# Patient Record
Sex: FEMALE | Race: BLACK OR AFRICAN AMERICAN | Employment: OTHER | URBAN - METROPOLITAN AREA
[De-identification: names, ages, dates, MRNs, and addresses within clinical notes are randomized per-mention and may not be internally consistent; named-entity substitution may affect disease eponyms.]

---

## 2017-05-30 ENCOUNTER — OFFICE VISIT (OUTPATIENT)
Dept: HEMATOLOGY | Age: 62
End: 2017-05-30

## 2017-05-30 VITALS
HEART RATE: 83 BPM | DIASTOLIC BLOOD PRESSURE: 73 MMHG | WEIGHT: 293 LBS | HEIGHT: 63 IN | TEMPERATURE: 99.4 F | OXYGEN SATURATION: 96 % | BODY MASS INDEX: 51.91 KG/M2 | SYSTOLIC BLOOD PRESSURE: 156 MMHG

## 2017-05-30 DIAGNOSIS — K75.4 AUTOIMMUNE HEPATITIS (HCC): Primary | ICD-10-CM

## 2017-05-30 PROBLEM — E66.9 OBESITY: Status: ACTIVE | Noted: 2017-05-30

## 2017-05-30 PROBLEM — E11.9 DIABETES MELLITUS TYPE 2, CONTROLLED (HCC): Status: ACTIVE | Noted: 2017-05-30

## 2017-05-30 PROBLEM — E78.00 HYPERCHOLESTEROLEMIA: Status: ACTIVE | Noted: 2017-05-30

## 2017-05-30 PROBLEM — I10 HYPERTENSION: Status: ACTIVE | Noted: 2017-05-30

## 2017-05-30 PROBLEM — R60.9 EDEMA: Status: ACTIVE | Noted: 2017-05-30

## 2017-05-30 RX ORDER — AZATHIOPRINE 50 MG/1
TABLET ORAL
COMMUNITY
Start: 2017-05-26 | End: 2017-05-30 | Stop reason: SDUPTHER

## 2017-05-30 RX ORDER — PREDNISONE 20 MG/1
TABLET ORAL
COMMUNITY
Start: 2017-05-26 | End: 2017-05-30 | Stop reason: SDUPTHER

## 2017-05-30 RX ORDER — DOXAZOSIN 2 MG/1
TABLET ORAL
Refills: 0 | COMMUNITY
Start: 2017-04-27 | End: 2018-04-26

## 2017-05-30 RX ORDER — FAMOTIDINE 20 MG/1
TABLET, FILM COATED ORAL
COMMUNITY
Start: 2017-05-08 | End: 2018-03-19

## 2017-05-30 RX ORDER — SPIRONOLACTONE 25 MG/1
TABLET ORAL
COMMUNITY
Start: 2017-05-18 | End: 2018-04-26

## 2017-05-30 RX ORDER — INSULIN LISPRO 100 [IU]/ML
INJECTION, SOLUTION INTRAVENOUS; SUBCUTANEOUS
Refills: 2 | COMMUNITY
Start: 2017-03-31 | End: 2018-03-19

## 2017-05-30 RX ORDER — EPINASTINE HYDROCHLORIDE 0.5 MG/ML
SOLUTION/ DROPS OPHTHALMIC
COMMUNITY
Start: 2017-05-27 | End: 2018-04-26

## 2017-05-30 RX ORDER — AZATHIOPRINE 50 MG/1
100 TABLET ORAL DAILY
Qty: 30 TAB | Refills: 0
Start: 2017-05-30 | End: 2018-03-19 | Stop reason: ALTCHOICE

## 2017-05-30 RX ORDER — INSULIN GLARGINE 100 [IU]/ML
26 INJECTION, SOLUTION SUBCUTANEOUS DAILY
COMMUNITY
Start: 2017-05-18

## 2017-05-30 RX ORDER — FUROSEMIDE 40 MG/1
40 TABLET ORAL DAILY
Qty: 90 TAB | Refills: 3 | Status: SHIPPED | OUTPATIENT
Start: 2017-05-30 | End: 2018-07-07 | Stop reason: SDUPTHER

## 2017-05-30 RX ORDER — SPIRONOLACTONE 100 MG/1
100 TABLET, FILM COATED ORAL DAILY
Qty: 90 TAB | Refills: 3 | Status: SHIPPED | OUTPATIENT
Start: 2017-05-30 | End: 2018-05-09 | Stop reason: SDUPTHER

## 2017-05-30 RX ORDER — AMLODIPINE BESYLATE 2.5 MG/1
TABLET ORAL
COMMUNITY
Start: 2017-05-08

## 2017-05-30 RX ORDER — PREDNISONE 20 MG/1
30 TABLET ORAL DAILY
Qty: 30 TAB | Refills: 0
Start: 2017-05-30 | End: 2017-10-19 | Stop reason: DRUGHIGH

## 2017-05-30 NOTE — MR AVS SNAPSHOT
Visit Information Date & Time Provider Department Dept. Phone Encounter #  
 5/30/2017  3:00 PM Neeta Montana MD [facility] [phone] [id number] Upcoming Health Maintenance Date Due Hepatitis C Screening 1955 DTaP/Tdap/Td series (1 - Tdap) 10/8/1974 PAP AKA CERVICAL CYTOLOGY 10/8/1974 BREAST CANCER SCRN MAMMOGRAM 10/8/2003 FOBT Q 1 YEAR AGE 50-75 10/8/2003 ZOSTER VACCINE AGE 60> 10/8/2013 INFLUENZA AGE 9 TO ADULT 8/1/2017 Allergies as of 5/30/2017  Review Complete On: 5/30/2017 By: Luis Mojica LPN Not on File Current Immunizations  Never Reviewed No immunizations on file. Not reviewed this visit Vitals BP Pulse Temp Height(growth percentile) Weight(growth percentile) SpO2  
 156/73 83 99.4 °F (37.4 °C) (Tympanic) 5' 3\" (1.6 m) 306 lb 4 oz (138.9 kg) 96% BMI Smoking Status 54.25 kg/m2 Never Smoker BMI and BSA Data Body Mass Index Body Surface Area  
 54.25 kg/m 2 2.48 m 2 Preferred Pharmacy Pharmacy Name Phone [place] [id number] [facility] [address] [place] [address] [phone] Your Updated Medication List  
  
   
This list is accurate as of: 5/30/17  4:24 PM.  Always use your most recent med list. amLODIPine 2.5 mg tablet Commonly known as:  NORVASC  
  
 azaTHIOprine 50 mg tablet Commonly known as:  [employer] Take 2 Tabs by mouth daily. doxazosin 2 mg tablet Commonly known as:  CARDURA TAKE 1 TABLET BY MOUTH DAILY  
  
 epinastine 0.05 % Drop  
  
 famotidine 20 mg tablet Commonly known as:  PEPCID  
  
 furosemide 40 mg tablet Commonly known as:  LASIX Take 1 Tab by mouth daily. HumaLOG KwikPen 100 unit/mL kwikpen Generic drug:  insulin lispro INJECT 2 UNITS SUBCUTANEOUSLY 3 TIMES A DAY WITH A MEAL. LANTUS SOLOSTAR 100 unit/mL (3 mL) Inpn Generic drug:  insulin glargine predniSONE 20 mg tablet Commonly known as:  [employer] Take 2 Tabs by mouth daily. * spironolactone 25 mg tablet Commonly known as:  ALDACTONE  
  
 * spironolactone 100 mg tablet Commonly known as:  ALDACTONE Take 1 Tab by mouth daily. * Notice: This list has 2 medication(s) that are the same as other medications prescribed for you. Read the directions carefully, and ask your doctor or other care provider to review them with you. Prescriptions Sent to Pharmacy Refills  
 furosemide (LASIX) 40 mg tablet 3 Sig: Take 1 Tab by mouth daily. Class: Normal  
 Pharmacy: [address] [place] [address], [address] [place] [address] Ph #: [phone] Route: Oral  
 spironolactone (ALDACTONE) 100 mg tablet 3 Sig: Take 1 Tab by mouth daily. Class: Normal  
 Pharmacy: [address] [place] [address], [address] [place] [address] Ph #: [phone] Route: Oral  
  
Introducing [facility]! [facility] introduces elmenus patient portal. Now you can access parts of your medical record, email your doctor's office, and request medication refills online. 1. In your internet browser, go to [email or web address] 2. Click on the First Time User? Click Here link in the Sign In box. You will see the New Member Sign Up page. 3. Enter your elmenus Access Code exactly as it appears below. You will not need to use this code after youve completed the sign-up process. If you do not sign up before the expiration date, you must request a new code. · elmenus Access Code: [email or web address] Expires: 8/28/2017  4:24 PM 
 
4. Enter the last four digits of your Social Security Number (xxxx) and Date of Birth (mm/dd/yyyy) as indicated and click Submit. You will be taken to the next sign-up page. 5. Create a elmenus ID. This will be your [employer] login ID and cannot be changed, so think of one that is secure and easy to remember. 6. Create a [employer] password. You can change your password at any time. 7. Enter your Password Reset Question and Answer. This can be used at a later time if you forget your password. 8. Enter your e-mail address. You will receive e-mail notification when new information is available in [address] 9. Click Sign Up. You can now view and download portions of your medical record. 10. Click the Download Summary menu link to download a portable copy of your medical information. If you have questions, please visit the Frequently Asked Questions section of the [employer] website. Remember, [employer] is NOT to be used for urgent needs. For medical emergencies, dial 911. Now available from your iPhone and Android! Please provide this summary of care documentation to your next provider. Your primary care clinician is listed as Will Moore. If you have any questions after today's visit, please call [phone].

## 2017-05-30 NOTE — PROGRESS NOTES
93 Nicanor Gómez MD, KAY Parsons PA-C Elihu Safe, MD, FACP, [address], [place]        [address] [facility], [address] [address]  22.     [phone]     FAX: 411 [address], [address] [facility], [address]     [phone]     FAX: [phone]       Patient Care Team:  Erma Cranker, MD as PCP - General (Family Practice)      Problem List  Date Reviewed: 5/30/2017          Codes Class Noted    Autoimmune hepatitis [facility]) ICD-10-CM: K75.4  ICD-9-CM: 571.42  5/30/2017        Obesity ICD-10-CM: E66.9  ICD-9-CM: 278.00  5/30/2017        Diabetes mellitus, type 2 ([facility] ICD-10-CM: E11.9  ICD-9-CM: 250.00  5/30/2017        Edema ICD-10-CM: R60.9  ICD-9-CM: 782.3  5/30/2017        Hypertension ICD-10-CM: I10  ICD-9-CM: 401.9  5/30/2017        Hypercholesterolemia ICD-10-CM: E78.00  ICD-9-CM: 272.0  5/30/2017                The physicians listed above have asked me to see Jigar Laurent in consultation regarding Autoimmune Hepatitis. All medical records sent by the referring physicians were reviewed including imaging studies and pathology. The patient is a 59 y.o. Black female who was first noted to have abnormalities in liver transaminases and alkaline phosphase in 3/2017. Further evaluation of these abnormalities has included serologic studies which were positive for ASMA    Ultrasound of the liver was performed in 3/2017. The results of the imaging are not available at this time. A liver biopsy was performed in 3/2017. This demonstrated severe inflammation portal fibrosis. The patient has been treated with Prednisone 60 then 40 now 30 mg and Imuran. As the does of prednsione was lower the dose of Imuran was increased from 50 to 100 mg.       The treatment was successful with normalization of serum liver enzymes. The initial AST/ALT were in the 700-800 range. These are now normal.  The initial ALP was 350. This is now down to 108. Tests of hepatic synthetic and metabolic function are normal, and the platelet count is normal.    The patient notes fatigue, swelling of the lower extremities, itching,     The patient has limitations in functional activities secondary to her obesity. The patient has not experienced yellowing of the eyes or skin,       ALLERGIES  Not on File    MEDICATIONS  Current Outpatient Prescriptions   Medication Sig    amLODIPine (NORVASC) 2.5 mg tablet     spironolactone (ALDACTONE) 25 mg tablet     predniSONE (DELTASONE) 20 mg tablet     HUMALOG KWIKPEN 100 unit/mL kwikpen INJECT 2 UNITS SUBCUTANEOUSLY 3 TIMES A DAY WITH A MEAL.  LANTUS SOLOSTAR 100 unit/mL (3 mL) inpn     famotidine (PEPCID) 20 mg tablet     epinastine 0.05 % drop     doxazosin (CARDURA) 2 mg tablet TAKE 1 TABLET BY MOUTH DAILY    azaTHIOprine (IMURAN) 50 mg tablet      No current facility-administered medications for this visit. SYSTEM REVIEW NOT RELATED TO LIVER DISEASE OR REVIEWED ABOVE:  Constitution systems: severely overweight  Eyes: Negative for visual changes. ENT: Negative for sore throat, painful swallowing. Respiratory: Negative for cough, hemoptysis, SOB. Cardiology: Negative for chest pain, palpitations. GI:  Negative for constipation or diarrhea. : Negative for urinary frequency, dysuria, hematuria, nocturia. Skin: Negative for rash. Hematology: Negative for easy bruising, blood clots. Musculo-skelatal: Arthritis of knees. Neurologic: Negative for headaches, dizziness, vertigo, memory problems not related to HE. Psychology: Negative for anxiety, depression. FAMILY HISTORY:  The father  of DM. The mother  of Sarcoidosis. There is no family history of liver disease.     There is no family history of immune disorders. SOCIAL HISTORY:  The patient is . The patient has 2 children, and 7 grandchildren. The patient has never used tobacco products. The patient consumes alcohol on social occasions never in excess. The patient has been abstinent from alcohol since 3/2017. The patient used to work in [employer] for children. The patient has not worked since 7/2015. PHYSICAL EXAMINATION:  /73  Pulse 83  Temp 99.4 °F (37.4 °C) (Tympanic)   Ht 5' 3\" (1.6 m)  Wt 306 lb 4 oz (138.9 kg)  SpO2 96%  BMI 54.25 kg/m2  General: No acute distress. Eyes: Sclera anicteric. ENT: No oral lesions. Thyroid normal.  Nodes: No adenopathy. Skin: No spider angiomata. No jaundice. No palmar erythema. Respiratory: Lungs clear to auscultation. Cardiovascular: Regular heart rate. No murmurs. No JVD. Abdomen: Soft non-tender. Liver size normal to percussion/palpation. Spleen not palpable. No obvious ascites. Extremities: No edema. No muscle wasting. No gross arthritic changes. Neurologic: Alert and oriented. Cranial nerves grossly intact. No asterixsis. LABORATORY STUDIES:  From 5/2017  AST/ALT/ALP/T Bili/ALB:  17/25/108/0.7/3.2  WBC/HB/PLT/INR:  8.2/12.0/177  BUN/CREAT:  10/0.79    SEROLOGIES:  3/2017. HBsAntigen negative, anti-HCV negative, Ferritin 239, iron saturation 11%, KAYLEE negative, ASMA positive 1:320,     LIVER HISTOLOGY:  3/2017. Severe active hepatitis with PMN and portal fibrosis. Biopsy slides not reviewed by MLS. ENDOSCOPIC PROCEDURES:  Not available or performed    RADIOLOGY:  3/2017. Ultrasound of liver. Mild increased echogenicity. No liver mass lesions. No dilated bile ducts. No ascites.     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  Alkaline phosphate is near normal.  Liver function is normal.  The platelet count is normal. The patient is receiving treatment with prednisone and Imuran. The patient is responding to the treatment well. Her severe obesity will be a big issue with long term prednisone. I would suggest that prednisone continue to be weaned and eventually stopped. I have lowered the dose of prednisoe today to 20 mg every day. I would continue to lower the dose by 5 mg every 2 weeks. If the liver enzymes remain normal she will stop prednisone and remain on the current dose of Imuron. If the liver enzymes flair during the weaning process or after prednisone is stopped I would not icrcease the dose of prednisone but switch to prograf. I would be happy to see her back at that point if you wish. She just had labs done at [facility] 1 week ago. We did not repeat any labs today. Anselmo and Gigi Higginbotham have done an excellent job managing and treating the AIH. I have told Karine Vo to return to their care and discuss if they wish to follow her long term or if they want to transfer care to me. I am good either way. The patient was directed to continue all current medications at the current dosages. There are no contraindications for the patient to take any medications that are necessary for treatment of other medical issues. The patient was counseled regarding alcohol consumption. The need for vaccination against viral hepatitis A and B will be assessed with serologic and instituted as appropriate. Anemia may be secondary to low iron stores. Will check ferritin and iron panel. If iron deficient will supplement with oral iron and evaluate for GI blood loss. All of the above issues were discussed with the patient. All questions were answered. The patient expressed a clear understanding of the above. No follow-up at [address] is needed. I would be glad to see the patient back for follow-up at any time in the future if the clinical situation changes.     Geoffrey Palma India Hutchinson, [address] of [address] [place] [address] [place]ST, [address] [facility], [address] [place] [address] Street [address] [phone]

## 2017-10-02 ENCOUNTER — OFFICE VISIT (OUTPATIENT)
Dept: HEMATOLOGY | Age: 62
End: 2017-10-02

## 2017-10-02 VITALS
HEART RATE: 87 BPM | BODY MASS INDEX: 51.91 KG/M2 | TEMPERATURE: 98.3 F | HEIGHT: 63 IN | DIASTOLIC BLOOD PRESSURE: 73 MMHG | SYSTOLIC BLOOD PRESSURE: 189 MMHG | OXYGEN SATURATION: 100 % | WEIGHT: 293 LBS | RESPIRATION RATE: 16 BRPM

## 2017-10-02 DIAGNOSIS — K75.4 AUTOIMMUNE HEPATITIS (HCC): Primary | ICD-10-CM

## 2017-10-02 RX ORDER — TACROLIMUS 1 MG/1
2 CAPSULE ORAL EVERY 12 HOURS
Qty: 120 CAP | Refills: 3 | Status: SHIPPED | OUTPATIENT
Start: 2017-10-02 | End: 2018-02-15 | Stop reason: SDUPTHER

## 2017-10-02 NOTE — PROGRESS NOTES
93 Nicanor Gómez MD, KAY Soot PA-C Sloan Blade, MD, FACP, [address], [place]        [address] [facility], [address] [facility] [place]  22.     [phone]     FAX: 150 [address], [address] [facility], [address]     [phone]     FAX: [phone]       Patient Care Team:  Zehra Wang MD as PCP - General (Family Practice)  Luis Enrique Reddy MD (Gastroenterology)      Problem List  Date Reviewed: 5/31/2017          Codes Class Noted    Autoimmune hepatitis [facility]) ICD-10-CM: K75.4  ICD-9-CM: 571.42  5/30/2017        Obesity ICD-10-CM: E66.9  ICD-9-CM: 278.00  5/30/2017        Diabetes mellitus, type 2 ([facility] ICD-10-CM: E11.9  ICD-9-CM: 250.00  5/30/2017        Edema ICD-10-CM: R60.9  ICD-9-CM: 782.3  5/30/2017        Hypertension ICD-10-CM: I10  ICD-9-CM: 401.9  5/30/2017        Hypercholesterolemia ICD-10-CM: E78.00  ICD-9-CM: 272.0  5/30/2017                The physicians listed above have asked me to see Justine Crisostomo in consultation regarding Autoimmune Hepatitis. All medical records sent by the referring physicians were reviewed including imaging studies and pathology. The patient is a 59 y.o. Black female who was first noted to have abnormalities in liver transaminases and alkaline phosphase in 3/2017. Further evaluation of these abnormalities has included serologic studies which were positive for ASMA    Ultrasound of the liver was performed in 3/2017. The results of the imaging are not available at this time. A liver biopsy was performed in 3/2017. This demonstrated severe inflammation portal fibrosis. The patient has been treated with Prednisone 60 then 40 now 30 mg and Imuran. As the does of prednsione was lower the dose of Imuran was increased from 50 to 100 mg. The treatment was successful with normalization of serum liver enzymes. The initial AST/ALT were in the 700-800 range. These are now normal.  The initial ALP was 350. This is now down to 108. Tests of hepatic synthetic and metabolic function are normal, and the platelet count is normal.    The patient notes fatigue, swelling of the lower extremities, itching,     The patient has limitations in functional activities secondary to her obesity. The patient has not experienced yellowing of the eyes or skin,       ALLERGIES  No Known Allergies    MEDICATIONS  Current Outpatient Prescriptions   Medication Sig    amLODIPine (NORVASC) 2.5 mg tablet     spironolactone (ALDACTONE) 25 mg tablet     HUMALOG KWIKPEN 100 unit/mL kwikpen INJECT 2 UNITS SUBCUTANEOUSLY 3 TIMES A DAY WITH A MEAL.  LANTUS SOLOSTAR 100 unit/mL (3 mL) inpn     famotidine (PEPCID) 20 mg tablet     epinastine 0.05 % drop     doxazosin (CARDURA) 2 mg tablet TAKE 1 TABLET BY MOUTH DAILY    azaTHIOprine (IMURAN) 50 mg tablet Take 2 Tabs by mouth daily.  predniSONE (DELTASONE) 20 mg tablet Take 2 Tabs by mouth daily.  furosemide (LASIX) 40 mg tablet Take 1 Tab by mouth daily.  spironolactone (ALDACTONE) 100 mg tablet Take 1 Tab by mouth daily. No current facility-administered medications for this visit. SYSTEM REVIEW NOT RELATED TO LIVER DISEASE OR REVIEWED ABOVE:  Constitution systems: severely overweight  Eyes: Negative for visual changes. ENT: Negative for sore throat, painful swallowing. Respiratory: Negative for cough, hemoptysis, SOB. Cardiology: Negative for chest pain, palpitations. GI:  Negative for constipation or diarrhea. : Negative for urinary frequency, dysuria, hematuria, nocturia. Skin: Negative for rash. Hematology: Negative for easy bruising, blood clots. Musculo-skelatal: Arthritis of knees.     Neurologic: Negative for headaches, dizziness, vertigo, memory problems not related to HE.  Psychology: Negative for anxiety, depression. FAMILY HISTORY:  The father  of DM. The mother  of Sarcoidosis. There is no family history of liver disease. There is no family history of immune disorders. SOCIAL HISTORY:  The patient is . The patient has 2 children, and 7 grandchildren. The patient has never used tobacco products. The patient consumes alcohol on social occasions never in excess. The patient has been abstinent from alcohol since 3/2017. The patient used to work in [employer] for children. The patient has not worked since 2015. PHYSICAL EXAMINATION:  /73 (BP 1 Location: Right arm, BP Patient Position: Sitting)  Pulse 87  Temp 98.3 °F (36.8 °C) (Tympanic)   Resp 16  Ht 5' 3\" (1.6 m)  Wt 303 lb (137.4 kg)  SpO2 100%  BMI 53.67 kg/m2  General: No acute distress. Eyes: Sclera anicteric. ENT: No oral lesions. Thyroid normal.  Nodes: No adenopathy. Skin: No spider angiomata. No jaundice. No palmar erythema. Respiratory: Lungs clear to auscultation. Cardiovascular: Regular heart rate. No murmurs. No JVD. Abdomen: Soft non-tender. Liver size normal to percussion/palpation. Spleen not palpable. No obvious ascites. Extremities: No edema. No muscle wasting. No gross arthritic changes. Neurologic: Alert and oriented. Cranial nerves grossly intact. No asterixsis. LABORATORY STUDIES:  From 2017  AST/ALT/ALP/T Bili/ALB:  17/25/108/0.7/3.2  WBC/HB/PLT/INR:  8.2/12.0/177  BUN/CREAT:  10/0.79    SEROLOGIES:  3/2017. HBsAntigen negative, anti-HCV negative, Ferritin 239, iron saturation 11%, KAYLEE negative, ASMA positive 1:320,     LIVER HISTOLOGY:  3/2017. Severe active hepatitis with PMN and portal fibrosis. Biopsy slides not reviewed by MLS. ENDOSCOPIC PROCEDURES:  Not available or performed    RADIOLOGY:  3/2017. Ultrasound of liver. Mild increased echogenicity. No liver mass lesions. No dilated bile ducts.   No ascites. 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  Alkaline phosphate is near normal.  Liver function is normal.  The platelet count is normal.      The patient is receiving treatment with prednisone and Imuran. The patient is responding to the treatment well. Her severe obesity will be a big issue with long term prednisone. I would suggest that prednisone continue to be weaned and eventually stopped. I have lowered the dose of prednisoe today to 20 mg every day. I would continue to lower the dose by 5 mg every 2 weeks. If the liver enzymes remain normal she will stop prednisone and remain on the current dose of Imuron. If the liver enzymes flair during the weaning process or after prednisone is stopped I would not icrcease the dose of prednisone but switch to prograf. I would be happy to see her back at that point if you wish. She just had labs done at [facility] 1 week ago. We did not repeat any labs today. Anselmo and Gaby Mon have done an excellent job managing and treating the AIH. I have told Conor Carrasco to return to their care and discuss if they wish to follow her long term or if they want to transfer care to me. I am good either way. The patient was directed to continue all current medications at the current dosages. There are no contraindications for the patient to take any medications that are necessary for treatment of other medical issues. The patient was counseled regarding alcohol consumption. The need for vaccination against viral hepatitis A and B will be assessed with serologic and instituted as appropriate. Anemia may be secondary to low iron stores. Will check ferritin and iron panel. If iron deficient will supplement with oral iron and evaluate for GI blood loss. All of the above issues were discussed with the patient. All questions were answered. The patient expressed a clear understanding of the above. No follow-up at [address] is needed. I would be glad to see the patient back for follow-up at any time in the future if the clinical situation changes.     Khurram Bobo MD  [facility] of [address] [place] [address], [address] [place] [address], [facility] [address]  [phone]

## 2017-10-02 NOTE — PROGRESS NOTES
93 Nicanor Gómez MD, KAY Wilson PA-C Lovie Fuller, MD, FACP, [address], NP        [address] [facility], [address] [facility], [place]  22.     [phone]     FAX: [address] [place] [address], [address] [facility], [address]     [phone]     FAX: [phone]       Patient Care Team:  Racquel Serrano MD as PCP - General (Family Practice)  Demetri Barron MD (Gastroenterology)      Problem List  Date Reviewed: 5/31/2017          Codes Class Noted    Autoimmune hepatitis [facility]) ICD-10-CM: K75.4  ICD-9-CM: 571.42  5/30/2017        Obesity ICD-10-CM: E66.9  ICD-9-CM: 278.00  5/30/2017        Diabetes mellitus, type 2 ([facility] ICD-10-CM: E11.9  ICD-9-CM: 250.00  5/30/2017        Edema ICD-10-CM: R60.9  ICD-9-CM: 782.3  5/30/2017        Hypertension ICD-10-CM: I10  ICD-9-CM: 401.9  5/30/2017        Hypercholesterolemia ICD-10-CM: E78.00  ICD-9-CM: 272.0  5/30/2017            Shahid Kothari [facility] the [facility] for management of autoimmune hepatitis. The active problem list, all pertinent past medical history, medications, and laboratory findings related to the liver disorder were reviewed with the patient. The patient is a 59 y.o. Black female who was first noted to have abnormalities in liver transaminases and alkaline phosphase in 3/2017. Further evaluation of these abnormalities has included serologic studies which were positive for ASMA    Ultrasound of the liver was performed in 3/2017. This demonstrated slightly increased echogenicity. A liver biopsy was performed in 3/2017. This demonstrated severe inflammation portal fibrosis. The patient has been treated with Prednisone and Imuran.   As the does of prednisone was being tapered by her GI doctor, the ALP began to increase and therefore her Prednisone was increased back to 20 mg. The initial AST/ALT were in the 700-800 range. These are now normal.  The initial ALP was 350. This is now 126. Tests of hepatic synthetic and metabolic function are normal, and the platelet count is normal.    The patient notes fatigue, swelling of the lower extremities, itching,     The patient has limitations in functional activities secondary to her obesity. The patient has not experienced yellowing of the eyes or skin,       ALLERGIES  No Known Allergies    MEDICATIONS  Current Outpatient Prescriptions   Medication Sig    amLODIPine (NORVASC) 2.5 mg tablet     spironolactone (ALDACTONE) 25 mg tablet     HUMALOG KWIKPEN 100 unit/mL kwikpen INJECT 2 UNITS SUBCUTANEOUSLY 3 TIMES A DAY WITH A MEAL.  LANTUS SOLOSTAR 100 unit/mL (3 mL) inpn     famotidine (PEPCID) 20 mg tablet     epinastine 0.05 % drop     doxazosin (CARDURA) 2 mg tablet TAKE 1 TABLET BY MOUTH DAILY    azaTHIOprine (IMURAN) 50 mg tablet Take 2 Tabs by mouth daily.  predniSONE (DELTASONE) 20 mg tablet Take 2 Tabs by mouth daily.  furosemide (LASIX) 40 mg tablet Take 1 Tab by mouth daily.  spironolactone (ALDACTONE) 100 mg tablet Take 1 Tab by mouth daily. No current facility-administered medications for this visit. SYSTEM REVIEW NOT RELATED TO LIVER DISEASE OR REVIEWED ABOVE:  Constitution systems: severely overweight  Eyes: Negative for visual changes. ENT: Negative for sore throat, painful swallowing. Respiratory: Negative for cough, hemoptysis, SOB. Cardiology: Negative for chest pain, palpitations. GI:  Negative for constipation or diarrhea. : Negative for urinary frequency, dysuria, hematuria, nocturia. Skin: Negative for rash. Hematology: Negative for easy bruising, blood clots. Musculo-skelatal: Arthritis of knees. Neurologic: Negative for headaches, dizziness, vertigo, memory problems not related to HE.   Psychology: Negative for anxiety, depression. FAMILY HISTORY:  The father  of DM. The mother  of Sarcoidosis. There is no family history of liver disease. There is no family history of immune disorders. SOCIAL HISTORY:  The patient is . The patient has 2 children, and 7 grandchildren. The patient has never used tobacco products. The patient consumes alcohol on social occasions never in excess. The patient has been abstinent from alcohol since 3/2017. The patient used to work in [employer] for children. The patient has not worked since 2015. PHYSICAL EXAMINATION:  /73 (BP 1 Location: Right arm, BP Patient Position: Sitting)  Pulse 87  Temp 98.3 °F (36.8 °C) (Tympanic)   Resp 16  Ht 5' 3\" (1.6 m)  Wt 303 lb (137.4 kg)  SpO2 100%  BMI 53.67 kg/m2  General: No acute distress. Eyes: Sclera anicteric. ENT: No oral lesions. Thyroid normal.  Nodes: No adenopathy. Skin: No spider angiomata. No jaundice. No palmar erythema. Respiratory: Lungs clear to auscultation. Cardiovascular: Regular heart rate. No murmurs. No JVD. Abdomen: Unable to perform due to habitus  Extremities: No edema. No muscle wasting. No gross arthritic changes. Neurologic: Alert and oriented. Cranial nerves grossly intact. No asterixsis. LABORATORY STUDIES:  From 2017  AST/ALT/ALP/T Bili/ALB:  25/22/126/0.7/3.3    SEROLOGIES:  3/2017. HBsAntigen negative, anti-HCV negative, Ferritin 239, iron saturation 11%, KAYLEE negative, ASMA positive 1:320,     LIVER HISTOLOGY:  3/2017. Severe active hepatitis with PMN and portal fibrosis. Biopsy slides not reviewed by MLS. ENDOSCOPIC PROCEDURES:  Not available or performed    RADIOLOGY:  3/2017. Ultrasound of liver. Mild increased echogenicity. No liver mass lesions. No dilated bile ducts. No ascites.     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  Alkaline phosphate is near normal.  Liver function is normal.  The platelet count is normal.      The patient is receiving treatment with prednisone and Imuran. The patient is responding to the treatment well. Her severe obesity will be a big issue with long term prednisone. I would suggest that prednisone continue to be weaned and eventually stopped. Current dose of prednisone is 20 mg every day. Prograf 2 mg twice daily ordered today. Patient instructed to reduce prednisone to 10 mg daily once she starts Prograf. Patient is to stay on azathioprine 50 mg twice daily. The patient was directed to continue all current medications at the current dosages. There are no contraindications for the patient to take any medications that are necessary for treatment of other medical issues. The patient was counseled regarding alcohol consumption. The need for vaccination against viral hepatitis A and B will be assessed with serologic and instituted as appropriate. BMP, LFT, CBC and tacrolimus level ordered today for patient to have drawn prior to next office visit. All of the above issues were discussed with the patient. All questions were answered. The patient expressed a clear understanding of the above. [address] [place] [address] in 2 weeks to review all data and determine the treatment plan.       Solitario Kuhn [place]  [address]    [address] [place] [address], [address] [place] [address] Kasandra Manley, [address]  [phone]

## 2017-10-16 ENCOUNTER — HOSPITAL ENCOUNTER (OUTPATIENT)
Dept: LAB | Age: 62
Discharge: HOME | End: 2017-10-16
Payer: SELF-PAY

## 2017-10-16 DIAGNOSIS — K75.4 AUTOIMMUNE HEPATITIS (HCC): ICD-10-CM

## 2017-10-16 LAB
ALBUMIN SERPL-MCNC: 3.1 G/DL (ref 3.4–5)
ALBUMIN/GLOB SERPL: 0.8 {RATIO} (ref 0.8–1.7)
ALP SERPL-CCNC: 107 U/L (ref 45–117)
ALT SERPL-CCNC: 15 U/L (ref 13–56)
ANION GAP SERPL CALC-SCNC: 10 MMOL/L (ref 3–18)
AST SERPL-CCNC: 13 U/L (ref 15–37)
BASOPHILS # BLD: 0 K/UL (ref 0–0.06)
BASOPHILS NFR BLD: 0 % (ref 0–2)
BILIRUB DIRECT SERPL-MCNC: 0.1 MG/DL (ref 0–0.2)
BILIRUB SERPL-MCNC: 0.3 MG/DL (ref 0.2–1)
BUN SERPL-MCNC: 12 MG/DL (ref 7–18)
BUN/CREAT SERPL: 15 (ref 12–20)
CALCIUM SERPL-MCNC: 8.9 MG/DL (ref 8.5–10.1)
CHLORIDE SERPL-SCNC: 106 MMOL/L (ref 100–108)
CO2 SERPL-SCNC: 29 MMOL/L (ref 21–32)
CREAT SERPL-MCNC: 0.79 MG/DL (ref 0.6–1.3)
DIFFERENTIAL METHOD BLD: ABNORMAL
EOSINOPHIL # BLD: 0.2 K/UL (ref 0–0.4)
EOSINOPHIL NFR BLD: 3 % (ref 0–5)
ERYTHROCYTE [DISTWIDTH] IN BLOOD BY AUTOMATED COUNT: 15.6 % (ref 11.6–14.5)
GLOBULIN SER CALC-MCNC: 4 G/DL (ref 2–4)
GLUCOSE SERPL-MCNC: 141 MG/DL (ref 74–99)
HCT VFR BLD AUTO: 39.4 % (ref 35–45)
HGB BLD-MCNC: 12.8 G/DL (ref 12–16)
LYMPHOCYTES # BLD: 2.7 K/UL (ref 0.9–3.6)
LYMPHOCYTES NFR BLD: 37 % (ref 21–52)
MCH RBC QN AUTO: 28.1 PG (ref 24–34)
MCHC RBC AUTO-ENTMCNC: 32.5 G/DL (ref 31–37)
MCV RBC AUTO: 86.6 FL (ref 74–97)
MONOCYTES # BLD: 0.5 K/UL (ref 0.05–1.2)
MONOCYTES NFR BLD: 7 % (ref 3–10)
NEUTS SEG # BLD: 3.9 K/UL (ref 1.8–8)
NEUTS SEG NFR BLD: 53 % (ref 40–73)
PLATELET # BLD AUTO: 300 K/UL (ref 135–420)
PMV BLD AUTO: 9.6 FL (ref 9.2–11.8)
POTASSIUM SERPL-SCNC: 4.8 MMOL/L (ref 3.5–5.5)
PROT SERPL-MCNC: 7.1 G/DL (ref 6.4–8.2)
RBC # BLD AUTO: 4.55 M/UL (ref 4.2–5.3)
SODIUM SERPL-SCNC: 145 MMOL/L (ref 136–145)
WBC # BLD AUTO: 7.3 K/UL (ref 4.6–13.2)

## 2017-10-16 PROCEDURE — 80197 ASSAY OF TACROLIMUS: CPT | Performed by: NURSE PRACTITIONER

## 2017-10-16 PROCEDURE — 80076 HEPATIC FUNCTION PANEL: CPT | Performed by: NURSE PRACTITIONER

## 2017-10-16 PROCEDURE — 85025 COMPLETE CBC W/AUTO DIFF WBC: CPT | Performed by: NURSE PRACTITIONER

## 2017-10-16 PROCEDURE — 36415 COLL VENOUS BLD VENIPUNCTURE: CPT | Performed by: NURSE PRACTITIONER

## 2017-10-16 PROCEDURE — 80048 BASIC METABOLIC PNL TOTAL CA: CPT | Performed by: NURSE PRACTITIONER

## 2017-10-17 LAB — TACROLIMUS BLD-MCNC: 2.2 NG/ML (ref 2–20)

## 2017-10-19 ENCOUNTER — OFFICE VISIT (OUTPATIENT)
Dept: HEMATOLOGY | Age: 62
End: 2017-10-19

## 2017-10-19 VITALS
RESPIRATION RATE: 18 BRPM | WEIGHT: 293 LBS | BODY MASS INDEX: 51.91 KG/M2 | OXYGEN SATURATION: 100 % | TEMPERATURE: 98.9 F | HEART RATE: 87 BPM | DIASTOLIC BLOOD PRESSURE: 86 MMHG | HEIGHT: 63 IN | SYSTOLIC BLOOD PRESSURE: 189 MMHG

## 2017-10-19 DIAGNOSIS — K75.4 AUTOIMMUNE HEPATITIS (HCC): Primary | ICD-10-CM

## 2017-10-19 RX ORDER — PREDNISONE 5 MG/1
5 TABLET ORAL DAILY
Qty: 30 TAB | Refills: 1 | Status: SHIPPED | OUTPATIENT
Start: 2017-10-19 | End: 2018-03-19

## 2017-10-19 NOTE — PROGRESS NOTES
[address], MD, Christo Breen, EROS Terrell MD, FACP, [address], NP        [address] [facility], [address] Horacio Marie  22.     [phone]     FAX: [facility], [address] [place] [address], [address]     [phone]     FAX: [phone]       Patient Care Team:  Harriet Soriano MD as PCP - General (Family Practice)  Romana Fujita, MD (Gastroenterology)      Problem List  Date Reviewed: 5/31/2017          Codes Class Noted    Autoimmune hepatitis [facility]) ICD-10-CM: K75.4  ICD-9-CM: 571.42  5/30/2017        Obesity ICD-10-CM: E66.9  ICD-9-CM: 278.00  5/30/2017        Diabetes mellitus, type 2 ([facility] ICD-10-CM: E11.9  ICD-9-CM: 250.00  5/30/2017        Edema ICD-10-CM: R60.9  ICD-9-CM: 782.3  5/30/2017        Hypertension ICD-10-CM: I10  ICD-9-CM: 401.9  5/30/2017        Hypercholesterolemia ICD-10-CM: E78.00  ICD-9-CM: 272.0  5/30/2017            Magda Foster returns to the [address] for management of autoimmune hepatitis. The active problem list, all pertinent past medical history, medications, and laboratory findings related to the liver disorder were reviewed with the patient. The patient is a 57 y.o. Black female who was first noted to have abnormalities in liver transaminases and alkaline phosphase in 3/2017. Further evaluation of these abnormalities has included serologic studies which were positive for ASMA    Ultrasound of the liver was performed in 3/2017. This demonstrated slightly increased echogenicity. A liver biopsy was performed in 3/2017. This demonstrated severe inflammation portal fibrosis. The patient has been treated with Prednisone and Imuran.   As the dose of prednisone was being tapered by her GI doctor, the ALP began to increase and therefore her Prednisone was increased back to 20 mg. The initial AST/ALT were in the 700-800 range. These are now normal.  The initial ALP was 350. This is now 107. Tests of hepatic synthetic and metabolic function are normal, and the platelet count is normal.    The patient notes fatigue, swelling of the lower extremities    The patient has limitations in functional activities secondary to morbid obesity. The patient has not experienced yellowing of the eyes or skin    ALLERGIES  No Known Allergies    MEDICATIONS  Current Outpatient Prescriptions   Medication Sig    predniSONE (DELTASONE) 5 mg tablet Take 1 Tab by mouth daily. Take 5mg daily x 2 weeks then decrease to 5mg every other day x 2 weeks    tacrolimus (PROGRAF) 1 mg capsule Take 2 Caps by mouth every twelve (12) hours. Indications: autoimmune hepatitis    amLODIPine (NORVASC) 2.5 mg tablet     spironolactone (ALDACTONE) 25 mg tablet     HUMALOG KWIKPEN 100 unit/mL kwikpen INJECT 2 UNITS SUBCUTANEOUSLY 3 TIMES A DAY WITH A MEAL.  LANTUS SOLOSTAR 100 unit/mL (3 mL) inpn     famotidine (PEPCID) 20 mg tablet     epinastine 0.05 % drop     doxazosin (CARDURA) 2 mg tablet TAKE 1 TABLET BY MOUTH DAILY    azaTHIOprine (IMURAN) 50 mg tablet Take 2 Tabs by mouth daily.  furosemide (LASIX) 40 mg tablet Take 1 Tab by mouth daily.  spironolactone (ALDACTONE) 100 mg tablet Take 1 Tab by mouth daily. No current facility-administered medications for this visit. SYSTEM REVIEW NOT RELATED TO LIVER DISEASE OR REVIEWED ABOVE:  Constitution systems: severely overweight  Eyes: Negative for visual changes. ENT: Negative for sore throat, painful swallowing. Respiratory: Negative for cough, hemoptysis, SOB. Cardiology: Negative for chest pain, palpitations. GI:  Negative for constipation or diarrhea. : Negative for urinary frequency, dysuria, hematuria, nocturia. Skin: Negative for rash.   Hematology: Negative for easy bruising, blood clots. Musculo-skelatal: Arthritis of knees. Neurologic: Negative for headaches, dizziness, vertigo, memory problems not related to HE. Psychology: Negative for anxiety, depression. FAMILY HISTORY:  The father  of DM. The mother  of Sarcoidosis. There is no family history of liver disease. There is no family history of immune disorders. SOCIAL HISTORY:  The patient is . The patient has 2 children, and 7 grandchildren. The patient has never used tobacco products. The patient consumes alcohol on social occasions never in excess. The patient has been abstinent from alcohol since 3/2017. The patient used to work in [employer] for children. The patient has not worked since 2015. PHYSICAL EXAMINATION:  /86 (BP 1 Location: Right arm, BP Patient Position: Sitting)  Pulse 87  Temp 98.9 °F (37.2 °C) (Tympanic)   Resp 18  Ht 5' 3\" (1.6 m)  Wt 312 lb (141.5 kg)  SpO2 100%  BMI 55.27 kg/m2  General: No acute distress. Eyes: Sclera anicteric. ENT: No oral lesions. Thyroid normal.  Nodes: No adenopathy. Skin: No spider angiomata. No jaundice. No palmar erythema. Respiratory: Lungs clear to auscultation. Cardiovascular: Regular heart rate. No murmurs. No JVD. Abdomen: Unable to perform due to habitus  Extremities: No edema. No muscle wasting. No gross arthritic changes. Neurologic: Alert and oriented. Cranial nerves grossly intact. No asterixsis.     LABORATORY STUDIES:    Liver [place] of [address] [place] [address] Units 10/16/2017   WBC 4.6 - 13.2 K/uL 7.3   ANC 1.8 - 8.0 K/UL 3.9   HGB 12.0 - 16.0 g/dL 12.8    - 420 K/uL 300   AST 15 - 37 U/L 13 (L)   ALT 13 - 56 U/L 15   Alk Phos 45 - 117 U/L 107   Bili, Total 0.2 - 1.0 MG/DL 0.3   Bili, Direct 0.0 - 0.2 MG/DL 0.1   Albumin 3.4 - 5.0 g/dL 3.1 (L)   BUN 7.0 - 18 MG/DL 12   Creat 0.6 - 1.3 MG/DL 0.79   Na 136 - 145 mmol/L 145   K 3.5 - 5.5 mmol/L 4.8   Cl 100 - 108 mmol/L 106   CO2 21 - 32 mmol/L 29   Glucose 74 - 99 mg/dL 141 (H)     From 8/2017  AST/ALT/ALP/T Bili/ALB:  25/22/126/0.7/3.3    SEROLOGIES:  3/2017. HBsAntigen negative, anti-HCV negative, Ferritin 239, iron saturation 11%, KAYLEE negative, ASMA positive 1:320,     LIVER HISTOLOGY:  3/2017. Severe active hepatitis with PMN and portal fibrosis. Biopsy slides not reviewed by MLS. ENDOSCOPIC PROCEDURES:  Not available or performed    RADIOLOGY:  3/2017. Ultrasound of liver. Mild increased echogenicity. No liver mass lesions. No dilated bile ducts. No ascites. 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  Alkaline phosphate is normal.  Liver function is normal.  The platelet count is normal.      The patient is receiving treatment with prednisone, Imuran, and Prograf. The patient is responding well to treatment. Her morbid obesity and diabetes  is of concern with long term prednisone. Therefore will continue to wean prednisone from 10 mg to 5 mg x two weeks then 5 mg every other day x 2 weeks then stop. Prograf 2 mg twice daily started at last visit. Patient is to stay on azathioprine 50 mg twice daily. The patient was directed to continue all current medications at the current dosages. There are no contraindications for the patient to take any medications that are necessary for treatment of other medical issues. HTN: Patient reports she did not take her medication prior to coming for visit. The patient was counseled regarding alcohol consumption. Vaccination for viral hepatitis A and B is recommended since the patient has no serologic evidence of previous exposure or vaccination with immunity. BMP, LFT, CBC and tacrolimus level ordered today for patient to have drawn prior to next office visit. All of the above issues were discussed with the patient. All questions were answered. The patient expressed a clear understanding of the above. [address] [address] in 4 weeks to review all data and determine ongoing treatment plan.       [place]  [address] [facility] [address]    [address] [place] [address], [address] [place] [address], [address] [place] [address] - [address]  [phone]

## 2017-10-19 NOTE — MR AVS SNAPSHOT
Visit Information Date & Time Provider Department Dept. Phone Encounter #  
 10/19/2017  9:15 AM KAY Migueloly [address] of  [address] [id number] Follow-up Instructions Return in about 4 weeks (around 11/16/2017) for Samantha. Upcoming Health Maintenance Date Due Hepatitis C Screening 1955 HEMOGLOBIN A1C Q6M 1955 LIPID PANEL Q1 1955 FOOT EXAM Q1 10/8/1963 MICROALBUMIN Q1 10/8/1963 EYE EXAM RETINAL OR DILATED Q1 10/8/1963 Pneumococcal 19-64 Medium Risk (1 of 1 - PPSV23) 10/8/1972 DTaP/Tdap/Td series (1 - Tdap) 10/8/1974 PAP AKA CERVICAL CYTOLOGY 10/8/1974 BREAST CANCER SCRN MAMMOGRAM 10/8/2003 FOBT Q 1 YEAR AGE 50-75 10/8/2003 ZOSTER VACCINE AGE 60> 8/8/2013 INFLUENZA AGE 9 TO ADULT 8/1/2017 Allergies as of 10/19/2017  Review Complete On: 10/19/2017 By: Yamilka Woody No Known Allergies Current Immunizations  Never Reviewed No immunizations on file. Not reviewed this visit You Were Diagnosed With   
  
 Codes Comments Autoimmune hepatitis ([facility]    -  Primary ICD-10-CM: K75.4 ICD-9-CM: 571.42 Vitals BP Pulse Temp Resp Height(growth percentile) 189/86 (BP 1 Location: Right arm, BP Patient Position: Sitting) 87 98.9 °F (37.2 °C) (Tympanic) 18 5' 3\" (1.6 m) Weight(growth percentile) SpO2 BMI OB Status Smoking Status 312 lb (141.5 kg) 100% 55.27 kg/m2 Menopause Never Smoker BMI and BSA Data Body Mass Index Body Surface Area  
 55.27 kg/m 2 2.51 m 2 Preferred Pharmacy Pharmacy Name Phone [id number] [place], [address] [place] Your Updated Medication List  
  
   
This list is accurate as of: 10/19/17  9:39 AM.  Always use your most recent med list. amLODIPine 2.5 mg tablet Commonly known as:  NORVASC  
  
 azaTHIOprine 50 mg tablet Commonly known as:  [employer]  
 Take 2 Tabs by mouth daily. doxazosin 2 mg tablet Commonly known as:  CARDURA TAKE 1 TABLET BY MOUTH DAILY  
  
 epinastine 0.05 % Drop  
  
 famotidine 20 mg tablet Commonly known as:  PEPCID  
  
 furosemide 40 mg tablet Commonly known as:  LASIX Take 1 Tab by mouth daily. HumaLOG KwikPen 100 unit/mL kwikpen Generic drug:  insulin lispro INJECT 2 UNITS SUBCUTANEOUSLY 3 TIMES A DAY WITH A MEAL. LANTUS SOLOSTAR 100 unit/mL (3 mL) Inpn Generic drug:  insulin glargine  
  
 predniSONE 5 mg tablet Commonly known as:  Berle Pouch Take 1 Tab by mouth daily. Take 5mg daily x 2 weeks then decrease to 5mg every other day x 2 weeks * spironolactone 25 mg tablet Commonly known as:  ALDACTONE  
  
 * spironolactone 100 mg tablet Commonly known as:  ALDACTONE Take 1 Tab by mouth daily. tacrolimus 1 mg capsule Commonly known as:  PROGRAF Take 2 Caps by mouth every twelve (12) hours. Indications: autoimmune hepatitis * Notice: This list has 2 medication(s) that are the same as other medications prescribed for you. Read the directions carefully, and ask your doctor or other care provider to review them with you. Prescriptions Sent to Pharmacy Refills  
 predniSONE (DELTASONE) 5 mg tablet 1 Sig: Take 1 Tab by mouth daily. Take 5mg daily x 2 weeks then decrease to 5mg every other day x 2 weeks Class: Normal  
 Pharmacy: [id number] [place], [address] [facility] [address]  #: [phone] Route: Oral  
  
Follow-up Instructions Return in about 4 weeks (around 11/16/2017) for Haile Hay. Introducing [facility]! [employer] introduces [employer] patient portal. Now you can access parts of your medical record, email your doctor's office, and request medication refills online. 1. In your internet browser, go to [email or web address] Inbox 2. Click on the First Time User? Click Here link in the Sign In box.  You will see the New Member Sign Up page. 3. Enter your [employer] Access Code exactly as it appears below. You will not need to use this code after youve completed the sign-up process. If you do not sign up before the expiration date, you must request a new code. · [employer] Access Code: [email or web address] Expires: 12/31/2017  2:20 PM 
 
4. Enter the last four digits of your Social Security Number (xxxx) and Date of Birth (mm/dd/yyyy) as indicated and click Submit. You will be taken to the next sign-up page. 5. Create a [employer]Wiki ID. This will be your [employer] login ID and cannot be changed, so think of one that is secure and easy to remember. 6. Create a [employer] password. You can change your password at any time. 7. Enter your Password Reset Question and Answer. This can be used at a later time if you forget your password. 8. Enter your e-mail address. You will receive e-mail notification when new information is available in [address] 9. Click Sign Up. You can now view and download portions of your medical record. 10. Click the Download Summary menu link to download a portable copy of your medical information. If you have questions, please visit the Frequently Asked Questions section of the [employer] website. Remember, [employer] is NOT to be used for urgent needs. For medical emergencies, dial 911. Now available from your iPhone and Android! Please provide this summary of care documentation to your next provider. Your primary care clinician is listed as Zehra Wang. If you have any questions after today's visit, please call [phone].

## 2017-11-13 ENCOUNTER — HOSPITAL ENCOUNTER (OUTPATIENT)
Dept: LAB | Age: 62
Discharge: HOME | End: 2017-11-13

## 2017-11-13 PROCEDURE — 99001 SPECIMEN HANDLING PT-LAB: CPT | Performed by: NURSE PRACTITIONER

## 2017-11-14 LAB
ALBUMIN SERPL-MCNC: 4 G/DL (ref 3.6–4.8)
ALP SERPL-CCNC: 100 IU/L (ref 39–117)
ALT SERPL-CCNC: 10 IU/L (ref 0–32)
AST SERPL-CCNC: 15 IU/L (ref 0–40)
BASOPHILS # BLD AUTO: 0.1 X10E3/UL (ref 0–0.2)
BASOPHILS NFR BLD AUTO: 1 %
BILIRUB DIRECT SERPL-MCNC: 0.16 MG/DL (ref 0–0.4)
BILIRUB SERPL-MCNC: 0.5 MG/DL (ref 0–1.2)
EOSINOPHIL # BLD AUTO: 0.2 X10E3/UL (ref 0–0.4)
EOSINOPHIL NFR BLD AUTO: 3 %
ERYTHROCYTE [DISTWIDTH] IN BLOOD BY AUTOMATED COUNT: 15.4 % (ref 12.3–15.4)
HCT VFR BLD AUTO: 39.3 % (ref 34–46.6)
HGB BLD-MCNC: 12.6 G/DL (ref 11.1–15.9)
IMM GRANULOCYTES # BLD: 0 X10E3/UL (ref 0–0.1)
IMM GRANULOCYTES NFR BLD: 0 %
LYMPHOCYTES # BLD AUTO: 3.2 X10E3/UL (ref 0.7–3.1)
LYMPHOCYTES NFR BLD AUTO: 45 %
MCH RBC QN AUTO: 28 PG (ref 26.6–33)
MCHC RBC AUTO-ENTMCNC: 32.1 G/DL (ref 31.5–35.7)
MCV RBC AUTO: 87 FL (ref 79–97)
MONOCYTES # BLD AUTO: 0.6 X10E3/UL (ref 0.1–0.9)
MONOCYTES NFR BLD AUTO: 9 %
NEUTROPHILS # BLD AUTO: 2.8 X10E3/UL (ref 1.4–7)
NEUTROPHILS NFR BLD AUTO: 42 %
PLATELET # BLD AUTO: 322 X10E3/UL (ref 150–379)
PROT SERPL-MCNC: 7.2 G/DL (ref 6–8.5)
RBC # BLD AUTO: 4.5 X10E6/UL (ref 3.77–5.28)
TACROLIMUS, 700249: NORMAL
WBC # BLD AUTO: 6.8 X10E3/UL (ref 3.4–10.8)

## 2017-11-15 LAB
ALBUMIN SERPL-MCNC: 4 G/DL (ref 3.6–4.8)
ALP SERPL-CCNC: 100 IU/L (ref 39–117)
ALT SERPL-CCNC: 10 IU/L (ref 0–32)
AST SERPL-CCNC: 15 IU/L (ref 0–40)
BASOPHILS # BLD AUTO: 0.1 X10E3/UL (ref 0–0.2)
BASOPHILS NFR BLD AUTO: 1 %
BILIRUB DIRECT SERPL-MCNC: 0.16 MG/DL (ref 0–0.4)
BILIRUB SERPL-MCNC: 0.5 MG/DL (ref 0–1.2)
EOSINOPHIL # BLD AUTO: 0.2 X10E3/UL (ref 0–0.4)
EOSINOPHIL NFR BLD AUTO: 3 %
ERYTHROCYTE [DISTWIDTH] IN BLOOD BY AUTOMATED COUNT: 15.4 % (ref 12.3–15.4)
HCT VFR BLD AUTO: 39.3 % (ref 34–46.6)
HGB BLD-MCNC: 12.6 G/DL (ref 11.1–15.9)
IMM GRANULOCYTES # BLD: 0 X10E3/UL (ref 0–0.1)
IMM GRANULOCYTES NFR BLD: 0 %
LYMPHOCYTES # BLD AUTO: 3.2 X10E3/UL (ref 0.7–3.1)
LYMPHOCYTES NFR BLD AUTO: 45 %
MCH RBC QN AUTO: 28 PG (ref 26.6–33)
MCHC RBC AUTO-ENTMCNC: 32.1 G/DL (ref 31.5–35.7)
MCV RBC AUTO: 87 FL (ref 79–97)
MONOCYTES # BLD AUTO: 0.6 X10E3/UL (ref 0.1–0.9)
MONOCYTES NFR BLD AUTO: 9 %
NEUTROPHILS # BLD AUTO: 2.8 X10E3/UL (ref 1.4–7)
NEUTROPHILS NFR BLD AUTO: 42 %
PLATELET # BLD AUTO: 322 X10E3/UL (ref 150–379)
PROT SERPL-MCNC: 7.2 G/DL (ref 6–8.5)
RBC # BLD AUTO: 4.5 X10E6/UL (ref 3.77–5.28)
TACROLIMUS, 700249: 1.7 NG/ML (ref 2–20)
WBC # BLD AUTO: 6.8 X10E3/UL (ref 3.4–10.8)

## 2017-11-16 ENCOUNTER — OFFICE VISIT (OUTPATIENT)
Dept: HEMATOLOGY | Age: 62
End: 2017-11-16

## 2017-11-16 ENCOUNTER — HOSPITAL ENCOUNTER (OUTPATIENT)
Dept: LAB | Age: 62
Discharge: HOME | End: 2017-11-16

## 2017-11-16 VITALS
OXYGEN SATURATION: 97 % | BODY MASS INDEX: 51.91 KG/M2 | DIASTOLIC BLOOD PRESSURE: 59 MMHG | SYSTOLIC BLOOD PRESSURE: 136 MMHG | RESPIRATION RATE: 18 BRPM | HEART RATE: 90 BPM | TEMPERATURE: 98.9 F | HEIGHT: 63 IN | WEIGHT: 293 LBS

## 2017-11-16 DIAGNOSIS — K75.4 AUTOIMMUNE HEPATITIS (HCC): Primary | ICD-10-CM

## 2017-11-16 PROCEDURE — 99001 SPECIMEN HANDLING PT-LAB: CPT | Performed by: NURSE PRACTITIONER

## 2017-11-16 NOTE — MR AVS SNAPSHOT
Visit Information Date & Time Provider Department Dept. Phone Encounter #  
 11/16/2017  9:15 AM KAY Menendez [address] of  [address] [id number] Follow-up Instructions Return in about 3 months (around 2/16/2018) for [facility]. Upcoming Health Maintenance Date Due Hepatitis C Screening 1955 HEMOGLOBIN A1C Q6M 1955 LIPID PANEL Q1 1955 FOOT EXAM Q1 10/8/1963 MICROALBUMIN Q1 10/8/1963 EYE EXAM RETINAL OR DILATED Q1 10/8/1963 Pneumococcal 19-64 Medium Risk (1 of 1 - PPSV23) 10/8/1972 DTaP/Tdap/Td series (1 - Tdap) 10/8/1974 PAP AKA CERVICAL CYTOLOGY 10/8/1974 BREAST CANCER SCRN MAMMOGRAM 10/8/2003 FOBT Q 1 YEAR AGE 50-75 10/8/2003 ZOSTER VACCINE AGE 60> 8/8/2013 Influenza Age 5 to Adult 8/1/2017 Allergies as of 11/16/2017  Review Complete On: 11/16/2017 By: Roxane Anthony No Known Allergies Current Immunizations  Never Reviewed No immunizations on file. Not reviewed this visit You Were Diagnosed With   
  
 Codes Comments Autoimmune hepatitis ([facility] [address] [facility]    -  Primary ICD-10-CM: K75.4 ICD-9-CM: 571.42 Vitals BP Pulse Temp Resp Height(growth percentile) 136/59 (BP 1 Location: Right arm, BP Patient Position: Sitting) 90 98.9 °F (37.2 °C) (Tympanic) 18 5' 3\" (1.6 m) Weight(growth percentile) SpO2 BMI OB Status Smoking Status 307 lb (139.3 kg) 97% 54.38 kg/m2 Menopause Never Smoker BMI and BSA Data Body Mass Index Body Surface Area 54.38 kg/m 2 2.49 m 2 Preferred Pharmacy Pharmacy Name Phone [id number] [place], [address] [place] Your Updated Medication List  
  
   
This list is accurate as of: 11/16/17 10:07 AM.  Always use your most recent med list. amLODIPine 2.5 mg tablet Commonly known as:  NORVASC  
  
 azaTHIOprine 50 mg tablet Commonly known as:  [employer]  
 Take 2 Tabs by mouth daily. doxazosin 2 mg tablet Commonly known as:  CARDURA TAKE 1 TABLET BY MOUTH DAILY  
  
 epinastine 0.05 % Drop  
  
 famotidine 20 mg tablet Commonly known as:  PEPCID  
  
 furosemide 40 mg tablet Commonly known as:  LASIX Take 1 Tab by mouth daily. HumaLOG KwikPen 100 unit/mL kwikpen Generic drug:  insulin lispro INJECT 2 UNITS SUBCUTANEOUSLY 3 TIMES A DAY WITH A MEAL. LANTUS SOLOSTAR 100 unit/mL (3 mL) Inpn Generic drug:  insulin glargine 26 Units daily. predniSONE 5 mg tablet Commonly known as:  Aminah Mons Take 1 Tab by mouth daily. Take 5mg daily x 2 weeks then decrease to 5mg every other day x 2 weeks * spironolactone 25 mg tablet Commonly known as:  ALDACTONE  
  
 * spironolactone 100 mg tablet Commonly known as:  ALDACTONE Take 1 Tab by mouth daily. tacrolimus 1 mg capsule Commonly known as:  PROGRAF Take 2 Caps by mouth every twelve (12) hours. Indications: autoimmune hepatitis * Notice: This list has 2 medication(s) that are the same as other medications prescribed for you. Read the directions carefully, and ask your doctor or other care provider to review them with you. Follow-up Instructions Return in about 3 months (around 2/16/2018) for Samantha. Introducing [facility] [place] [facility]! [facility] introduces [employer] patient portal. Now you can access parts of your medical record, email your doctor's office, and request medication refills online. 1. In your internet browser, go to [email or web address] 2. Click on the First Time User? Click Here link in the Sign In box. You will see the New Member Sign Up page. 3. Enter your [employer] Access Code exactly as it appears below. You will not need to use this code after youve completed the sign-up process. If you do not sign up before the expiration date, you must request a new code. · [employer] Access Code: [email or web address] Expires: 12/31/2017  1:20 PM 
 
4. Enter the last four digits of your Social Security Number (xxxx) and Date of Birth (mm/dd/yyyy) as indicated and click Submit. You will be taken to the next sign-up page. 5. Create a HelpHive ID. This will be your HelpHive login ID and cannot be changed, so think of one that is secure and easy to remember. 6. Create a HelpHive password. You can change your password at any time. 7. Enter your Password Reset Question and Answer. This can be used at a later time if you forget your password. 8. Enter your e-mail address. You will receive e-mail notification when new information is available in [address] 9. Click Sign Up. You can now view and download portions of your medical record. 10. Click the Download Summary menu link to download a portable copy of your medical information. If you have questions, please visit the Frequently Asked Questions section of the HelpHive website. Remember, HelpHive is NOT to be used for urgent needs. For medical emergencies, dial 911. Now available from your iPhone and Android! Please provide this summary of care documentation to your next provider. Your primary care clinician is listed as Jorge A Batres. If you have any questions after today's visit, please call [phone].

## 2017-11-16 NOTE — PROGRESS NOTES
93 Nicanor Gómez MD, KAY Avery PA-C Ralston Mates, MD, FACP, [address], NP        [address] [facility], [address] [address]  22.     [phone]     FAX: [address] [place] [address], [address] [facility], [address]     [phone]     FAX: [phone]       Patient Care Team:  Sandra Silva MD as PCP - General (Family Practice)  Domingo Calabrese MD (Gastroenterology)      Problem List  Date Reviewed: 10/19/2017          Codes Class Noted    Autoimmune hepatitis [facility]) ICD-10-CM: K75.4  ICD-9-CM: 571.42  5/30/2017        Obesity ICD-10-CM: E66.9  ICD-9-CM: 278.00  5/30/2017        Diabetes mellitus, type 2 ([facility] ICD-10-CM: E11.9  ICD-9-CM: 250.00  5/30/2017        Edema ICD-10-CM: R60.9  ICD-9-CM: 782.3  5/30/2017        Hypertension ICD-10-CM: I10  ICD-9-CM: 401.9  5/30/2017        Hypercholesterolemia ICD-10-CM: E78.00  ICD-9-CM: 272.0  5/30/2017            Rut Aparicio returns to the [address] for management of autoimmune hepatitis. The active problem list, all pertinent past medical history, medications, and laboratory findings related to the liver disorder were reviewed with the patient. The patient is a 57 y.o. Black female who was first noted to have abnormalities in liver transaminases and alkaline phosphase in 3/2017. Further evaluation of these abnormalities has included serologic studies which were positive for ASMA    Ultrasound of the liver was performed in 3/2017. This demonstrated slightly increased echogenicity. A liver biopsy was performed in 3/2017. This demonstrated severe inflammation portal fibrosis. The patient has been treated with Prednisone and Imuran.   As the dose of prednisone was being tapered by her GI doctor, the ALP began to increase and therefore her Prednisone was increased back to 20 mg. The initial AST/ALT were in the 700-800 range. These are now normal.  The initial ALP was 350. This is now normal.     Tests of hepatic synthetic and metabolic function are normal, and the platelet count is normal.    The patient notes fatigue, swelling of the lower extremities    The patient has limitations in functional activities secondary to morbid obesity. The patient has not experienced yellowing of the eyes or skin    ALLERGIES  No Known Allergies    MEDICATIONS  Current Outpatient Prescriptions   Medication Sig    predniSONE (DELTASONE) 5 mg tablet Take 1 Tab by mouth daily. Take 5mg daily x 2 weeks then decrease to 5mg every other day x 2 weeks    tacrolimus (PROGRAF) 1 mg capsule Take 2 Caps by mouth every twelve (12) hours. Indications: autoimmune hepatitis    amLODIPine (NORVASC) 2.5 mg tablet     LANTUS SOLOSTAR 100 unit/mL (3 mL) inpn 26 Units daily.  epinastine 0.05 % drop     azaTHIOprine (IMURAN) 50 mg tablet Take 2 Tabs by mouth daily.  furosemide (LASIX) 40 mg tablet Take 1 Tab by mouth daily.  spironolactone (ALDACTONE) 100 mg tablet Take 1 Tab by mouth daily.  spironolactone (ALDACTONE) 25 mg tablet     HUMALOG KWIKPEN 100 unit/mL kwikpen INJECT 2 UNITS SUBCUTANEOUSLY 3 TIMES A DAY WITH A MEAL.  famotidine (PEPCID) 20 mg tablet     doxazosin (CARDURA) 2 mg tablet TAKE 1 TABLET BY MOUTH DAILY     No current facility-administered medications for this visit. SYSTEM REVIEW NOT RELATED TO LIVER DISEASE OR REVIEWED ABOVE:  Constitution systems: severely overweight  Eyes: Negative for visual changes. ENT: Negative for sore throat, painful swallowing. Respiratory: Negative for cough, hemoptysis, SOB. Cardiology: Negative for chest pain, palpitations. GI:  Negative for constipation or diarrhea. : Negative for urinary frequency, dysuria, hematuria, nocturia. Skin: Negative for rash.   Hematology: Negative for easy bruising, blood clots. Musculo-skelatal: Arthritis of knees. Neurologic: Negative for headaches, dizziness, vertigo, memory problems not related to HE. Psychology: Negative for anxiety, depression. FAMILY HISTORY:  The father  of DM. The mother  of Sarcoidosis. There is no family history of liver disease. There is no family history of immune disorders. SOCIAL HISTORY:  The patient is . The patient has 2 children, and 7 grandchildren. The patient has never used tobacco products. The patient consumes alcohol on social occasions never in excess. The patient has been abstinent from alcohol since 3/2017. The patient used to work in [employer] for children. The patient has not worked since 2015. PHYSICAL EXAMINATION:  There were no vitals taken for this visit. General: No acute distress. Eyes: Sclera anicteric. ENT: No oral lesions. Thyroid normal.  Nodes: No adenopathy. Skin: No spider angiomata. No jaundice. No palmar erythema. Respiratory: Lungs clear to auscultation. Cardiovascular: Regular heart rate. No murmurs. No JVD. Abdomen: Unable to perform due to habitus  Extremities: No edema. No muscle wasting. No gross arthritic changes. Neurologic: Alert and oriented. Cranial nerves grossly intact. No asterixsis.     LABORATORY STUDIES:    Liver [place] of [address] Units 2017 10/16/2017   WBC 3.4 - 10.8 x10E3/uL 6.8 7.3   ANC 1.4 - 7.0 x10E3/uL 2.8 3.9   HGB 11.1 - 15.9 g/dL 12.6 12.8    - 379 x10E3/uL 322 300   AST 0 - 40 IU/L 15 13 (L)   ALT 0 - 32 IU/L 10 15   Alk Phos 39 - 117 IU/L 100 107   Bili, Total 0.0 - 1.2 mg/dL 0.5 0.3   Bili, Direct 0.00 - 0.40 mg/dL 0.16 0.1   Albumin 3.6 - 4.8 g/dL 4.0 3.1 (L)   BUN 7.0 - 18 MG/DL  12   Creat 0.6 - 1.3 MG/DL  0.79   Na 136 - 145 mmol/L  145   K 3.5 - 5.5 mmol/L  4.8   Cl 100 - 108 mmol/L  106   CO2  21 - 32 mmol/L  29   Glucose 74 - 99 mg/dL 141 (H)     TACROLIMUS LEVEL:  10/2017- 2.2  11/2017- 1.7    SEROLOGIES:  3/2017. HBsAntigen negative, anti-HCV negative, Ferritin 239, iron saturation 11%, KAYLEE negative, ASMA positive 1:320,     LIVER HISTOLOGY:  3/2017. Severe active hepatitis with PMN and portal fibrosis. Biopsy slides not reviewed by MLS. ENDOSCOPIC PROCEDURES:  Not available or performed    RADIOLOGY:  3/2017. Ultrasound of liver. Mild increased echogenicity. No liver mass lesions. No dilated bile ducts. No ascites. 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  Alkaline phosphate is normal.  Liver function is normal.  The platelet count is normal.      The patient is receiving treatment with prednisone, Imuran, and Prograf. The patient is responding well to treatment. Her morbid obesity and diabetes  is of concern with long term prednisone. Patient had been instructed to wean down prednisone to 5 mg every other day for 2 weeks then stop but she states she is still taking it. Patient told to stop prednisone today. Prograf 2 mg twice daily started 10/2017. Patient is to stay on azathioprine 50 mg twice daily. The patient was directed to continue all current medications at the current dosages. There are no contraindications for the patient to take any medications that are necessary for treatment of other medical issues. The patient was counseled regarding alcohol consumption. Vaccination for viral hepatitis A and B is recommended since the patient has no serologic evidence of previous exposure or vaccination with immunity. BMP, LFT, CBC and tacrolimus level ordered today for patient to have drawn in 2 weeks. All of the above issues were discussed with the patient. All questions were answered. The patient expressed a clear understanding of the above.     [facility] in 3 months to determine ongoing treatment plan.        Yasmany Gorman, KAY  [address] [facility]    4 [facility], [address] [place] [address] Kasandra Manley, [address]  [phone]

## 2017-11-16 NOTE — PROGRESS NOTES
Justine Crisostomo is a 57 y.o. female    No chief complaint on file. 1. Have you been to the ER, urgent care clinic or hospitalized since your last visit? NO.     2. Have you seen or consulted any other health care providers outside of the [address] [place] [address] since your last visit (Include any pap smears or colon screening)?  NO

## 2017-12-04 ENCOUNTER — HOSPITAL ENCOUNTER (OUTPATIENT)
Dept: LAB | Age: 62
Discharge: HOME | End: 2017-12-04

## 2017-12-04 PROCEDURE — 99001 SPECIMEN HANDLING PT-LAB: CPT | Performed by: NURSE PRACTITIONER

## 2017-12-06 LAB
ALBUMIN SERPL-MCNC: 4.1 G/DL (ref 3.6–4.8)
ALP SERPL-CCNC: 108 IU/L (ref 39–117)
ALT SERPL-CCNC: 9 IU/L (ref 0–32)
AST SERPL-CCNC: 13 IU/L (ref 0–40)
BASOPHILS # BLD AUTO: 0 X10E3/UL (ref 0–0.2)
BASOPHILS NFR BLD AUTO: 1 %
BILIRUB DIRECT SERPL-MCNC: 0.14 MG/DL (ref 0–0.4)
BILIRUB SERPL-MCNC: 0.4 MG/DL (ref 0–1.2)
BUN SERPL-MCNC: 17 MG/DL (ref 8–27)
BUN/CREAT SERPL: 18 (ref 12–28)
CALCIUM SERPL-MCNC: 9.5 MG/DL (ref 8.7–10.3)
CHLORIDE SERPL-SCNC: 99 MMOL/L (ref 96–106)
CO2 SERPL-SCNC: 21 MMOL/L (ref 18–29)
CREAT SERPL-MCNC: 0.95 MG/DL (ref 0.57–1)
EOSINOPHIL # BLD AUTO: 0.2 X10E3/UL (ref 0–0.4)
EOSINOPHIL NFR BLD AUTO: 2 %
ERYTHROCYTE [DISTWIDTH] IN BLOOD BY AUTOMATED COUNT: 14.6 % (ref 12.3–15.4)
GFR SERPLBLD CREATININE-BSD FMLA CKD-EPI: 63 ML/MIN/1.73
GFR SERPLBLD CREATININE-BSD FMLA CKD-EPI: 73 ML/MIN/1.73
GLUCOSE SERPL-MCNC: 118 MG/DL (ref 65–99)
HCT VFR BLD AUTO: 40.8 % (ref 34–46.6)
HGB BLD-MCNC: 13.2 G/DL (ref 11.1–15.9)
IMM GRANULOCYTES # BLD: 0 X10E3/UL (ref 0–0.1)
IMM GRANULOCYTES NFR BLD: 0 %
LYMPHOCYTES # BLD AUTO: 3.4 X10E3/UL (ref 0.7–3.1)
LYMPHOCYTES NFR BLD AUTO: 44 %
MCH RBC QN AUTO: 27.5 PG (ref 26.6–33)
MCHC RBC AUTO-ENTMCNC: 32.4 G/DL (ref 31.5–35.7)
MCV RBC AUTO: 85 FL (ref 79–97)
MONOCYTES # BLD AUTO: 0.6 X10E3/UL (ref 0.1–0.9)
MONOCYTES NFR BLD AUTO: 8 %
NEUTROPHILS # BLD AUTO: 3.5 X10E3/UL (ref 1.4–7)
NEUTROPHILS NFR BLD AUTO: 45 %
PLATELET # BLD AUTO: 324 X10E3/UL (ref 150–379)
POTASSIUM SERPL-SCNC: 4.9 MMOL/L (ref 3.5–5.2)
PROT SERPL-MCNC: 7.6 G/DL (ref 6–8.5)
RBC # BLD AUTO: 4.8 X10E6/UL (ref 3.77–5.28)
SODIUM SERPL-SCNC: 142 MMOL/L (ref 134–144)
TACROLIMUS, 700249: 3 NG/ML (ref 2–20)
WBC # BLD AUTO: 7.7 X10E3/UL (ref 3.4–10.8)

## 2018-02-15 ENCOUNTER — HOSPITAL ENCOUNTER (OUTPATIENT)
Dept: LAB | Age: 63
Discharge: HOME | End: 2018-02-15
Payer: MEDICARE

## 2018-02-15 ENCOUNTER — OFFICE VISIT (OUTPATIENT)
Dept: HEMATOLOGY | Age: 63
End: 2018-02-15

## 2018-02-15 VITALS
TEMPERATURE: 99.2 F | OXYGEN SATURATION: 92 % | SYSTOLIC BLOOD PRESSURE: 164 MMHG | HEART RATE: 86 BPM | BODY MASS INDEX: 51.6 KG/M2 | DIASTOLIC BLOOD PRESSURE: 76 MMHG | WEIGHT: 291.2 LBS | RESPIRATION RATE: 18 BRPM | HEIGHT: 63 IN

## 2018-02-15 DIAGNOSIS — K76.89 AUTOIMMUNE LIVER DISEASE: ICD-10-CM

## 2018-02-15 DIAGNOSIS — K76.89 AUTOIMMUNE LIVER DISEASE: Primary | ICD-10-CM

## 2018-02-15 PROBLEM — E66.01 OBESITY, MORBID (HCC): Status: ACTIVE | Noted: 2018-02-15

## 2018-02-15 LAB
ALBUMIN SERPL-MCNC: 3.3 G/DL (ref 3.4–5)
ALBUMIN/GLOB SERPL: 0.8 {RATIO} (ref 0.8–1.7)
ALP SERPL-CCNC: 95 U/L (ref 45–117)
ALT SERPL-CCNC: 13 U/L (ref 13–56)
ANION GAP SERPL CALC-SCNC: 10 MMOL/L (ref 3–18)
AST SERPL-CCNC: 13 U/L (ref 15–37)
BASOPHILS # BLD: 0 K/UL (ref 0–0.06)
BASOPHILS NFR BLD: 1 % (ref 0–2)
BILIRUB DIRECT SERPL-MCNC: 0.1 MG/DL (ref 0–0.2)
BILIRUB SERPL-MCNC: 0.6 MG/DL (ref 0.2–1)
BUN SERPL-MCNC: 8 MG/DL (ref 7–18)
BUN/CREAT SERPL: 10 (ref 12–20)
CALCIUM SERPL-MCNC: 8.9 MG/DL (ref 8.5–10.1)
CHLORIDE SERPL-SCNC: 106 MMOL/L (ref 100–108)
CO2 SERPL-SCNC: 26 MMOL/L (ref 21–32)
CREAT SERPL-MCNC: 0.8 MG/DL (ref 0.6–1.3)
DIFFERENTIAL METHOD BLD: ABNORMAL
EOSINOPHIL # BLD: 0.2 K/UL (ref 0–0.4)
EOSINOPHIL NFR BLD: 3 % (ref 0–5)
ERYTHROCYTE [DISTWIDTH] IN BLOOD BY AUTOMATED COUNT: 16.3 % (ref 11.6–14.5)
GLOBULIN SER CALC-MCNC: 3.9 G/DL (ref 2–4)
GLUCOSE SERPL-MCNC: 113 MG/DL (ref 74–99)
HCT VFR BLD AUTO: 37.3 % (ref 35–45)
HGB BLD-MCNC: 12 G/DL (ref 12–16)
LYMPHOCYTES # BLD: 2.9 K/UL (ref 0.9–3.6)
LYMPHOCYTES NFR BLD: 41 % (ref 21–52)
MCH RBC QN AUTO: 27.1 PG (ref 24–34)
MCHC RBC AUTO-ENTMCNC: 32.2 G/DL (ref 31–37)
MCV RBC AUTO: 84.2 FL (ref 74–97)
MONOCYTES # BLD: 0.7 K/UL (ref 0.05–1.2)
MONOCYTES NFR BLD: 9 % (ref 3–10)
NEUTS SEG # BLD: 3.3 K/UL (ref 1.8–8)
NEUTS SEG NFR BLD: 46 % (ref 40–73)
PLATELET # BLD AUTO: 313 K/UL (ref 135–420)
PMV BLD AUTO: 9.8 FL (ref 9.2–11.8)
POTASSIUM SERPL-SCNC: 4.1 MMOL/L (ref 3.5–5.5)
PROT SERPL-MCNC: 7.2 G/DL (ref 6.4–8.2)
RBC # BLD AUTO: 4.43 M/UL (ref 4.2–5.3)
SODIUM SERPL-SCNC: 142 MMOL/L (ref 136–145)
WBC # BLD AUTO: 7.1 K/UL (ref 4.6–13.2)

## 2018-02-15 PROCEDURE — 80048 BASIC METABOLIC PNL TOTAL CA: CPT | Performed by: NURSE PRACTITIONER

## 2018-02-15 PROCEDURE — 85025 COMPLETE CBC W/AUTO DIFF WBC: CPT | Performed by: NURSE PRACTITIONER

## 2018-02-15 PROCEDURE — 80076 HEPATIC FUNCTION PANEL: CPT | Performed by: NURSE PRACTITIONER

## 2018-02-15 PROCEDURE — 80197 ASSAY OF TACROLIMUS: CPT | Performed by: NURSE PRACTITIONER

## 2018-02-15 PROCEDURE — 36415 COLL VENOUS BLD VENIPUNCTURE: CPT | Performed by: NURSE PRACTITIONER

## 2018-02-15 RX ORDER — TACROLIMUS 1 MG/1
1 CAPSULE ORAL EVERY 12 HOURS
Qty: 60 CAP | Refills: 3 | Status: SHIPPED | OUTPATIENT
Start: 2018-02-15 | End: 2018-11-19 | Stop reason: SDUPTHER

## 2018-02-15 NOTE — PROGRESS NOTES
70 Franki Flores MD, [address], [place], [place]       Mercedes Sylvester, NP Gregery Cranker, EROS Cui, [facility]   KAY Vázquez NP [address] [facility] [address]    at [address]    [address] [place] [address], [address] [phone]    FAX: [address] [place] [address], [address]    [phone]    FAX: [phone]       Patient Care Team:  Contreras Hays MD as PCP - General (Family Practice)  Priyanka Bowles MD (Gastroenterology)      Problem List  Date Reviewed: 10/19/2017          Codes Class Noted    Obesity, morbid [facility]) ICD-10-CM: E66.01  ICD-9-CM: 278.01  2/15/2018        Autoimmune hepatitis ([facility] ICD-10-CM: K75.4  ICD-9-CM: 571.42  5/30/2017        Obesity ICD-10-CM: E66.9  ICD-9-CM: 278.00  5/30/2017        Diabetes mellitus, type 2 ([facility] ICD-10-CM: E11.9  ICD-9-CM: 250.00  5/30/2017        Edema ICD-10-CM: R60.9  ICD-9-CM: 782.3  5/30/2017        Hypertension ICD-10-CM: I10  ICD-9-CM: 401.9  5/30/2017        Hypercholesterolemia ICD-10-CM: E78.00  ICD-9-CM: 272.0  5/30/2017              Ashley Allen [facility] for management of autoimmune hepatitis. The active problem list, all pertinent past medical history, medications, and laboratory findings related to the liver disorder were reviewed with the patient. The patient is a 57 y.o. Black female who was first noted to have abnormalities in liver transaminases and alkaline phosphase in 3/2017. Further evaluation of these abnormalities has included serologic studies which were positive for ASMA    Ultrasound of the liver was performed in 3/2017. This demonstrated slightly increased echogenicity.      A liver biopsy was performed in 3/2017. This demonstrated severe inflammation portal fibrosis. The patient has been treated with Prednisone and Imuran. As the dose of prednisone was being tapered by her GI doctor, the ALP began to increase and therefore her Prednisone was increased back to 20 mg. The initial AST/ALT were in the 700-800 range. These are now normal.  The initial ALP was 350. This is now normal.     Tests of hepatic synthetic and metabolic function are normal, and the platelet count is normal.    The patient notes fatigue, swelling of the lower extremities    The patient has limitations in functional activities secondary to morbid obesity. The patient has not experienced yellowing of the eyes or skin    ALLERGIES  No Known Allergies    MEDICATIONS  Current Outpatient Prescriptions   Medication Sig    predniSONE (DELTASONE) 5 mg tablet Take 1 Tab by mouth daily. Take 5mg daily x 2 weeks then decrease to 5mg every other day x 2 weeks    tacrolimus (PROGRAF) 1 mg capsule Take 2 Caps by mouth every twelve (12) hours. Indications: autoimmune hepatitis    amLODIPine (NORVASC) 2.5 mg tablet     spironolactone (ALDACTONE) 25 mg tablet     HUMALOG KWIKPEN 100 unit/mL kwikpen INJECT 2 UNITS SUBCUTANEOUSLY 3 TIMES A DAY WITH A MEAL.  LANTUS SOLOSTAR 100 unit/mL (3 mL) inpn 26 Units daily.  famotidine (PEPCID) 20 mg tablet     epinastine 0.05 % drop     doxazosin (CARDURA) 2 mg tablet TAKE 1 TABLET BY MOUTH DAILY    azaTHIOprine (IMURAN) 50 mg tablet Take 2 Tabs by mouth daily.  furosemide (LASIX) 40 mg tablet Take 1 Tab by mouth daily.  spironolactone (ALDACTONE) 100 mg tablet Take 1 Tab by mouth daily. No current facility-administered medications for this visit. SYSTEM REVIEW NOT RELATED TO LIVER DISEASE OR REVIEWED ABOVE:  Constitution systems: severely overweight  Eyes: Negative for visual changes. ENT: Negative for sore throat, painful swallowing. Respiratory: Negative for cough, hemoptysis, SOB. Cardiology: Negative for chest pain, palpitations. GI:  Negative for constipation or diarrhea. : Negative for urinary frequency, dysuria, hematuria, nocturia. Skin: Negative for rash. Hematology: Negative for easy bruising, blood clots. Musculo-skelatal: Arthritis of knees. Neurologic: Negative for headaches, dizziness, vertigo, memory problems not related to HE. Psychology: Negative for anxiety, depression. FAMILY HISTORY:  The father  of DM. The mother  of Sarcoidosis. There is no family history of liver disease. There is no family history of immune disorders. SOCIAL HISTORY:  The patient is . The patient has 2 children, and 7 grandchildren. The patient has never used tobacco products. The patient consumes alcohol on social occasions never in excess. The patient has been abstinent from alcohol since 3/2017. The patient used to work in [employer]. The patient has not worked since 2015. PHYSICAL EXAMINATION:  /76 (BP 1 Location: Right arm, BP Patient Position: Sitting)  Pulse 86  Temp 99.2 °F (37.3 °C) (Tympanic)   Resp 18  Ht 5' 3\" (1.6 m)  Wt 291 lb 3.2 oz (132.1 kg)  SpO2 92%  BMI 51.58 kg/m2  General: No acute distress. Eyes: Sclera anicteric. ENT: No oral lesions. Thyroid normal.  Nodes: No adenopathy. Skin: No spider angiomata. No jaundice. No palmar erythema. Respiratory: Lungs clear to auscultation. Cardiovascular: Regular heart rate. No murmurs. No JVD. Abdomen: Unable to perform due to habitus  Extremities: No edema. No muscle wasting. No gross arthritic changes. Neurologic: Alert and oriented. Cranial nerves grossly intact. No asterixsis.     LABORATORY STUDIES:    Liver [place] of [address] Units 2017 10/16/2017   WBC 3.4 - 10.8 x10E3/uL 6.8 7.3   ANC 1.4 - 7.0 x10E3/uL 2.8 3.9   HGB 11.1 - 15.9 g/dL 12.6 12.8   PLT 150 - 379 x10E3/uL 322 300   AST 0 - 40 IU/L 15 13 (L)   ALT 0 - 32 IU/L 10 15   Alk Phos 39 - 117 IU/L 100 107   Bili, Total 0.0 - 1.2 mg/dL 0.5 0.3   Bili, Direct 0.00 - 0.40 mg/dL 0.16 0.1   Albumin 3.6 - 4.8 g/dL 4.0 3.1 (L)   BUN 7.0 - 18 MG/DL  12   Creat 0.6 - 1.3 MG/DL  0.79   Na 136 - 145 mmol/L  145   K 3.5 - 5.5 mmol/L  4.8   Cl 100 - 108 mmol/L  106   CO2  21 - 32 mmol/L  29   Glucose 74 - 99 mg/dL  141 (H)     TACROLIMUS LEVEL:  10/2017- 2.2  11/2017- 1.7    SEROLOGIES:  3/2017. HBsAntigen negative, anti-HCV negative, Ferritin 239, iron saturation 11%, KAYLEE negative, ASMA positive 1:320,     LIVER HISTOLOGY:  3/2017. Severe active hepatitis with PMN and portal fibrosis. Biopsy slides not reviewed by MLS. ENDOSCOPIC PROCEDURES:  Not available or performed    RADIOLOGY:  3/2017. Ultrasound of liver. Mild increased echogenicity. No liver mass lesions. No dilated bile ducts. No ascites. 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  Alkaline phosphate is normal.  Liver function is normal.  The platelet count is normal.      The patient is receiving treatment with prednisone, Imuran, and Prograf. The patient is responding well to treatment. Patient has been off Prednisone since 11/2017. Will decrease Prograf from 2 mg twice daily to 1 mg twice daily. Patient is to stay on azathioprine 50 mg twice daily. The patient was directed to continue all current medications at the current dosages. There are no contraindications for the patient to take any medications that are necessary for treatment of other medical issues. The patient was counseled regarding alcohol consumption. Vaccination for viral hepatitis A and B is recommended since the patient has no serologic evidence of previous exposure or vaccination with immunity.     BMP, LFT, CBC and tacrolimus level ordered today. All of the above issues were discussed with the patient. All questions were answered. The patient expressed a clear understanding of the above. [facility] 87 in 1 month to determine ongoing treatment plan.        Dedrick Fitzgerald, KAY  [address]    4 [facility], [address] [place] [address] Solange Cartwright, [address]  [phone]

## 2018-02-15 NOTE — MR AVS SNAPSHOT
[address] [place] [address] 
[phone] Patient: Jeana Urena MRN: [id number] :1955 Visit Information Date & Time Provider Department Dept. Phone Encounter #  
 2/15/2018 10:00 AM KAY Burns [address] of  [address] [id number] Follow-up Instructions Return in about 4 weeks (around 3/15/2018) for Evelina Damein . Upcoming Health Maintenance Date Due Hepatitis C Screening 1955 HEMOGLOBIN A1C Q6M 1955 LIPID PANEL Q1 1955 FOOT EXAM Q1 10/8/1963 MICROALBUMIN Q1 10/8/1963 EYE EXAM RETINAL OR DILATED Q1 10/8/1963 Pneumococcal 19-64 Medium Risk (1 of 1 - PPSV23) 10/8/1972 DTaP/Tdap/Td series (1 - Tdap) 10/8/1974 PAP AKA CERVICAL CYTOLOGY 10/8/1974 BREAST CANCER SCRN MAMMOGRAM 10/8/2003 FOBT Q 1 YEAR AGE 50-75 10/8/2003 ZOSTER VACCINE AGE 60> 2013 Influenza Age 5 to Adult 2017 Allergies as of 2/15/2018  Review Complete On: 2/15/2018 By: Clovis Vora No Known Allergies Current Immunizations  Never Reviewed No immunizations on file. Not reviewed this visit You Were Diagnosed With   
  
 Codes Comments Autoimmune liver disease    -  Primary ICD-10-CM: K76.89 
ICD-9-CM: 573.8 Vitals BP Pulse Temp Resp Height(growth percentile) 164/76 (BP 1 Location: Right arm, BP Patient Position: Sitting) 86 99.2 °F (37.3 °C) (Tympanic) 18 5' 3\" (1.6 m) Weight(growth percentile) SpO2 BMI OB Status Smoking Status 291 lb 3.2 oz (132.1 kg) 92% 51.58 kg/m2 Menopause Never Smoker BMI and BSA Data Body Mass Index Body Surface Area 51.58 kg/m 2 2.42 m 2 Preferred Pharmacy Pharmacy Name Phone [id number] [place], [address] Your Updated Medication List  
  
   
This list is accurate as of: 2/15/18 10:44 AM.  Always use your most recent med list. amLODIPine 2.5 mg tablet Commonly known as:  NORVASC  
  
 azaTHIOprine 50 mg tablet Commonly known as:  The Pepsi Take 2 Tabs by mouth daily. doxazosin 2 mg tablet Commonly known as:  CARDURA TAKE 1 TABLET BY MOUTH DAILY  
  
 epinastine 0.05 % Drop  
  
 famotidine 20 mg tablet Commonly known as:  PEPCID  
  
 furosemide 40 mg tablet Commonly known as:  LASIX Take 1 Tab by mouth daily. HumaLOG KwikPen 100 unit/mL kwikpen Generic drug:  insulin lispro INJECT 2 UNITS SUBCUTANEOUSLY 3 TIMES A DAY WITH A MEAL. LANTUS SOLOSTAR 100 unit/mL (3 mL) Inpn Generic drug:  insulin glargine 26 Units daily. predniSONE 5 mg tablet Commonly known as:  Peg Meg Take 1 Tab by mouth daily. Take 5mg daily x 2 weeks then decrease to 5mg every other day x 2 weeks * spironolactone 25 mg tablet Commonly known as:  ALDACTONE  
  
 * spironolactone 100 mg tablet Commonly known as:  ALDACTONE Take 1 Tab by mouth daily. tacrolimus 1 mg capsule Commonly known as:  PROGRAF Take 1 Cap by mouth every twelve (12) hours. Indications: autoimmune hepatitis * Notice: This list has 2 medication(s) that are the same as other medications prescribed for you. Read the directions carefully, and ask your doctor or other care provider to review them with you. Prescriptions Sent to Pharmacy Refills  
 tacrolimus (PROGRAF) 1 mg capsule 3 Sig: Take 1 Cap by mouth every twelve (12) hours. Indications: autoimmune hepatitis Class: Normal  
 Pharmacy: [id number]- [place], [address] [place], [place] [address]Th [facility] #: [phone] Route: Oral  
  
Follow-up Instructions Return in about 4 weeks (around 3/15/2018) for Samantha . To-Do List   
 02/15/2018 Lab:  CBC WITH AUTOMATED DIFF   
  
 02/15/2018 Lab:  HEPATIC FUNCTION PANEL   
  
 02/15/2018 Lab:  METABOLIC PANEL, BASIC   
  
 02/15/2018 Lab: TACROLIMUS, WHOLE BLOOD Introducing [facility] & HEALTH SERVICES! Carmelita Fothergill introduces Spot Runner patient portal. Now you can access parts of your medical record, email your doctor's office, and request medication refills online. 1. In your internet browser, go to [email or web address] 2. Click on the First Time User? Click Here link in the Sign In box. You will see the New Member Sign Up page. 3. Enter your Spot Runner Access Code exactly as it appears below. You will not need to use this code after youve completed the sign-up process. If you do not sign up before the expiration date, you must request a new code. · Spot Runner Access Code: [email or web address] Expires: 5/16/2018 10:44 AM 
 
4. Enter the last four digits of your Social Security Number (xxxx) and Date of Birth (mm/dd/yyyy) as indicated and click Submit. You will be taken to the next sign-up page. 5. Create a Spot Runner ID. This will be your Spot Runner login ID and cannot be changed, so think of one that is secure and easy to remember. 6. Create a Spot Runner password. You can change your password at any time. 7. Enter your Password Reset Question and Answer. This can be used at a later time if you forget your password. 8. Enter your e-mail address. You will receive e-mail notification when new information is available in [address] 9. Click Sign Up. You can now view and download portions of your medical record. 10. Click the Download Summary menu link to download a portable copy of your medical information. If you have questions, please visit the Frequently Asked Questions section of the Spot Runner website. Remember, Spot Runner is NOT to be used for urgent needs. For medical emergencies, dial 911. Now available from your iPhone and Android! Please provide this summary of care documentation to your next provider. Your primary care clinician is listed as Kellie Orellana.  If you have any questions after today's visit, please call [phone].

## 2018-02-15 NOTE — PROGRESS NOTES
1. Have you been to the ER, urgent care clinic since your last visit? Hospitalized since your last visit? No    2. Have you seen or consulted any other health care providers outside of the [address] [place] [address] since your last visit? Include any pap smears or colon screening.  No

## 2018-02-17 LAB — TACROLIMUS BLD-MCNC: 4 NG/ML (ref 2–20)

## 2018-03-19 ENCOUNTER — HOSPITAL ENCOUNTER (OUTPATIENT)
Dept: LAB | Age: 63
Discharge: HOME | End: 2018-03-19

## 2018-03-19 ENCOUNTER — OFFICE VISIT (OUTPATIENT)
Dept: HEMATOLOGY | Age: 63
End: 2018-03-19

## 2018-03-19 VITALS
DIASTOLIC BLOOD PRESSURE: 72 MMHG | SYSTOLIC BLOOD PRESSURE: 154 MMHG | BODY MASS INDEX: 51.88 KG/M2 | TEMPERATURE: 98.8 F | WEIGHT: 292.8 LBS | RESPIRATION RATE: 18 BRPM | HEIGHT: 63 IN | OXYGEN SATURATION: 97 % | HEART RATE: 60 BPM

## 2018-03-19 DIAGNOSIS — K75.4 AUTOIMMUNE HEPATITIS (HCC): Primary | ICD-10-CM

## 2018-03-19 PROCEDURE — 99001 SPECIMEN HANDLING PT-LAB: CPT | Performed by: NURSE PRACTITIONER

## 2018-03-19 RX ORDER — ATORVASTATIN CALCIUM 10 MG/1
TABLET, FILM COATED ORAL DAILY
COMMUNITY
End: 2018-07-26

## 2018-03-19 NOTE — PROGRESS NOTES
70 Franki Flores MD, Vani, Cite Byron Hui, Jorje Garica, EROS Brown, [facility]   Karla Alcaraz, KAY Ryder, KAY Pierre [facility] 136    at [facility] AT [address], [address] [place] [address], [facility] 22.    [phone]    FAX: [address] [place] [address], [address]    [phone]    FAX: [phone]       Patient Care Team:  Veena Asher MD as PCP - General (Family Practice)  Brooklynn Lovell MD (Gastroenterology)      Problem List  Date Reviewed: 3/19/2018          Codes Class Noted    Obesity, morbid [facility]) ICD-10-CM: E66.01  ICD-9-CM: 278.01  2/15/2018        Autoimmune hepatitis ([facility] 75.) ICD-10-CM: K75.4  ICD-9-CM: 571.42  5/30/2017        Obesity ICD-10-CM: E66.9  ICD-9-CM: 278.00  5/30/2017        Diabetes mellitus, type 2 ([facility] ICD-10-CM: E11.9  ICD-9-CM: 250.00  5/30/2017        Edema ICD-10-CM: R60.9  ICD-9-CM: 782.3  5/30/2017        Hypertension ICD-10-CM: I10  ICD-9-CM: 401.9  5/30/2017        Hypercholesterolemia ICD-10-CM: E78.00  ICD-9-CM: 272.0  5/30/2017              Anna Orellana returns to the [address] for management of autoimmune hepatitis. The active problem list, all pertinent past medical history, medications, and laboratory findings related to the liver disorder were reviewed with the patient. The patient is a 57 y.o. Black female who was first noted to have abnormalities in liver transaminases and alkaline phosphase in 3/2017. Further evaluation of these abnormalities has included serologic studies which were positive for ASMA    Ultrasound of the liver was performed in 3/2017. This demonstrated slightly increased echogenicity.      A liver biopsy was performed in 3/2017. This demonstrated severe inflammation portal fibrosis. The initial AST/ALT were in the 700-800 range. These are now normal.  he initial ALP was 350. This is now normal.     Tests of hepatic synthetic and metabolic function are normal, and the platelet count is normal.    The patient notes fatigue, swelling of the lower extremities. The patient has limitations in functional activities secondary to morbid obesity. The patient has not experienced yellowing of the eyes or skin. ALLERGIES  No Known Allergies    MEDICATIONS  Current Outpatient Prescriptions   Medication Sig    atorvastatin (LIPITOR) 10 mg tablet Take  by mouth daily.  tacrolimus (PROGRAF) 1 mg capsule Take 1 Cap by mouth every twelve (12) hours. Indications: autoimmune hepatitis    amLODIPine (NORVASC) 2.5 mg tablet     spironolactone (ALDACTONE) 25 mg tablet     LANTUS SOLOSTAR 100 unit/mL (3 mL) inpn 26 Units daily.  epinastine 0.05 % drop     doxazosin (CARDURA) 2 mg tablet TAKE 1 TABLET BY MOUTH DAILY    furosemide (LASIX) 40 mg tablet Take 1 Tab by mouth daily.  spironolactone (ALDACTONE) 100 mg tablet Take 1 Tab by mouth daily. No current facility-administered medications for this visit. SYSTEM REVIEW NOT RELATED TO LIVER DISEASE OR REVIEWED ABOVE:  Constitution systems: morbid obesity  Eyes: Negative for visual changes. ENT: Negative for sore throat, painful swallowing. Respiratory: Negative for cough, hemoptysis, SOB. Cardiology: Negative for chest pain, palpitations. GI:  Negative for constipation or diarrhea. : Negative for urinary frequency, dysuria, hematuria, nocturia. Skin: Negative for rash. Hematology: Negative for easy bruising, blood clots. Musculo-skelatal: Arthritis of knees. Neurologic: Negative for headaches, dizziness, vertigo, memory problems not related to HE. Psychology: Negative for anxiety, depression.      FAMILY HISTORY:  The father  of DM. The mother  of Sarcoidosis. There is no family history of liver disease. There is no family history of immune disorders. SOCIAL HISTORY:  The patient is . The patient has 2 children, and 7 grandchildren. The patient has never used tobacco products. The patient consumes alcohol on social occasions never in excess. The patient has been abstinent from alcohol since 3/2017. The patient used to work in [employer] for children. The patient has not worked since 2015. PHYSICAL EXAMINATION:  /72 (BP 1 Location: Right arm, BP Patient Position: Sitting)  Pulse 60  Temp 98.8 °F (37.1 °C) (Tympanic)   Resp 18  Ht 5' 3\" (1.6 m)  Wt 292 lb 12.8 oz (132.8 kg)  SpO2 97%  BMI 51.87 kg/m2  General: No acute distress. Eyes: Sclera anicteric. ENT: No oral lesions. Thyroid normal.  Nodes: No adenopathy. Skin: No spider angiomata. No jaundice. No palmar erythema. Respiratory: Lungs clear to auscultation. Cardiovascular: Regular heart rate. No murmurs. No JVD. Abdomen: Unable to perform due to habitus  Extremities: No edema. No muscle wasting. No gross arthritic changes. Neurologic: Alert and oriented. Cranial nerves grossly intact. No asterixsis.     LABORATORY STUDIES:  Liver [place] of [address]  [address] Units 2/15/2018 2017   WBC 4.6 - 13.2 K/uL 7.1 7.7   ANC 1.8 - 8.0 K/UL 3.3 3.5   HGB 12.0 - 16.0 g/dL 12.0 13.2    - 420 K/uL 313 324   AST 15 - 37 U/L 13 (L) 13   ALT 13 - 56 U/L 13 9   Alk Phos 45 - 117 U/L 95 108   Bili, Total 0.2 - 1.0 MG/DL 0.6 0.4   Bili, Direct 0.0 - 0.2 MG/DL 0.1 0.14   Albumin 3.4 - 5.0 g/dL 3.3 (L) 4.1   BUN 7.0 - 18 MG/DL 8 17   Creat 0.6 - 1.3 MG/DL 0.80 0.95   Na 136 - 145 mmol/L 142 142   K 3.5 - 5.5 mmol/L 4.1 4.9   Cl 100 - 108 mmol/L 106 99   CO2 21 - 32 mmol/L 26 21   Glucose 74 - 99 mg/dL 113 (H) 118 (H)     TACROLIMUS LEVEL:  10/2017- 2.2  2017- 1.7  2018- 4.0 SEROLOGIES:  3/2017. HBsAntigen negative, anti-HCV negative, Ferritin 239, iron saturation 11%, KAYLEE negative, ASMA positive 1:320,     LIVER HISTOLOGY:  3/2017. Severe active hepatitis with PMN and portal fibrosis. Biopsy slides not reviewed by MLS. ENDOSCOPIC PROCEDURES:  Not available or performed    RADIOLOGY:  3/2017. Ultrasound of liver. Mild increased echogenicity. No liver mass lesions. No dilated bile ducts. No ascites. 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  Alkaline phosphate is normal.  Liver function is normal.  The platelet count is normal.      The patient is receiving treatment with Imuran and Prograf. The patient has responded well to treatment. Patient has been off Prednisone since 11/2017. Continue Prograf 1 mg twice daily. Discontinue azathioprine 50 mg twice daily. The patient was directed to continue all other medications at the current dosages. There are no contraindications for the patient to take any medications that are necessary for treatment of other medical issues. The patient was counseled regarding alcohol consumption. Vaccination for viral hepatitis A and B is recommended since the patient has no serologic evidence of previous exposure or vaccination with immunity. BMP, LFT, CBC and tacrolimus level ordered today. All of the above issues were discussed with the patient. All questions were answered. The patient expressed a clear understanding of the above. [facility] [place] [address] in 1 month to determine ongoing treatment plan.        Nilo Hudson, KAY  [address] [address] [facility], [address]  [phone]

## 2018-03-19 NOTE — MR AVS SNAPSHOT
[address] [place] [address] 
[phone] Patient: Kylie Hay MRN: [id number] :1955 Visit Information Date & Time Provider Department Dept. Phone Encounter #  
 3/19/2018  8:00 AM KAY Cookoly [address] of  [address] [id number] Follow-up Instructions Return in about 4 weeks (around 2018) for Samantha. Upcoming Health Maintenance Date Due Hepatitis C Screening 1955 HEMOGLOBIN A1C Q6M 1955 LIPID PANEL Q1 1955 FOOT EXAM Q1 10/8/1963 MICROALBUMIN Q1 10/8/1963 EYE EXAM RETINAL OR DILATED Q1 10/8/1963 Pneumococcal 19-64 Medium Risk (1 of 1 - PPSV23) 10/8/1972 DTaP/Tdap/Td series (1 - Tdap) 10/8/1974 PAP AKA CERVICAL CYTOLOGY 10/8/1974 BREAST CANCER SCRN MAMMOGRAM 10/8/2003 FOBT Q 1 YEAR AGE 50-75 10/8/2003 ZOSTER VACCINE AGE 60> 2013 Influenza Age 5 to Adult 2017 Allergies as of 3/19/2018  Review Complete On: 3/19/2018 By: Earlene Calvo NP No Known Allergies Current Immunizations  Never Reviewed No immunizations on file. Not reviewed this visit You Were Diagnosed With   
  
 Codes Comments Autoimmune hepatitis ([facility]    -  Primary ICD-10-CM: K75.4 ICD-9-CM: 571.42 Vitals BP Pulse Temp Resp Height(growth percentile) 154/72 (BP 1 Location: Right arm, BP Patient Position: Sitting) 60 98.8 °F (37.1 °C) (Tympanic) 18 5' 3\" (1.6 m) Weight(growth percentile) SpO2 BMI OB Status Smoking Status 292 lb 12.8 oz (132.8 kg) 97% 51.87 kg/m2 Menopause Never Smoker Vitals History BMI and BSA Data Body Mass Index Body Surface Area  
 51.87 kg/m 2 2.43 m 2 Preferred Pharmacy Pharmacy Name Phone [id number], [address] Your Updated Medication List  
  
   
 This list is accurate as of 3/19/18  8:43 AM.  Always use your most recent med list. amLODIPine 2.5 mg tablet Commonly known as:  NORVASC  
  
 atorvastatin 10 mg tablet Commonly known as:  LIPITOR Take  by mouth daily. doxazosin 2 mg tablet Commonly known as:  CARDURA TAKE 1 TABLET BY MOUTH DAILY  
  
 epinastine 0.05 % Drop  
  
 furosemide 40 mg tablet Commonly known as:  LASIX Take 1 Tab by mouth daily. LANTUS SOLOSTAR U-100 INSULIN 100 unit/mL (3 mL) Inpn Generic drug:  insulin glargine 26 Units daily. * spironolactone 25 mg tablet Commonly known as:  ALDACTONE  
  
 * spironolactone 100 mg tablet Commonly known as:  ALDACTONE Take 1 Tab by mouth daily. tacrolimus 1 mg capsule Commonly known as:  PROGRAF Take 1 Cap by mouth every twelve (12) hours. Indications: autoimmune hepatitis * Notice: This list has 2 medication(s) that are the same as other medications prescribed for you. Read the directions carefully, and ask your doctor or other care provider to review them with you. Follow-up Instructions Return in about 4 weeks (around 4/16/2018) for [place]. To-Do List   
 03/19/2018 Lab:  CBC WITH AUTOMATED DIFF   
  
 03/19/2018 Lab:  HEPATIC FUNCTION PANEL   
  
 03/19/2018 Lab:  METABOLIC PANEL, BASIC   
  
 03/19/2018 Lab:  TACROLIMUS, WHOLE BLOOD Introducing [facility] & HEALTH SERVICES! [facility] introduces Kite.ly patient portal. Now you can access parts of your medical record, email your doctor's office, and request medication refills online. 1. In your internet browser, go to [email or web address] ([email or web address] 2. Click on the First Time User? Click Here link in the Sign In box. You will see the New Member Sign Up page. 3. Enter your Kite.ly Access Code exactly as it appears below. You will not need to use this code after youve completed the sign-up process.  If you do not sign up before the expiration date, you must request a new code. · EmergenSee Access Code: [email or web address] Expires: 5/16/2018 11:44 AM 
 
4. Enter the last four digits of your Social Security Number (xxxx) and Date of Birth (mm/dd/yyyy) as indicated and click Submit. You will be taken to the next sign-up page. 5. Create a EmergenSee ID. This will be your EmergenSee login ID and cannot be changed, so think of one that is secure and easy to remember. 6. Create a EmergenSee password. You can change your password at any time. 7. Enter your Password Reset Question and Answer. This can be used at a later time if you forget your password. 8. Enter your e-mail address. You will receive e-mail notification when new information is available in [address] 9. Click Sign Up. You can now view and download portions of your medical record. 10. Click the Download Summary menu link to download a portable copy of your medical information. If you have questions, please visit the Frequently Asked Questions section of the EmergenSee website. Remember, EmergenSee is NOT to be used for urgent needs. For medical emergencies, dial 911. Now available from your iPhone and Android! Please provide this summary of care documentation to your next provider. Your primary care clinician is listed as Cuco Navarro. If you have any questions after today's visit, please call [phone].

## 2018-03-19 NOTE — PROGRESS NOTES
1. Have you been to the ER, urgent care clinic since your last visit? Hospitalized since your last visit? No    2. Have you seen or consulted any other health care providers outside of the [address] [place] [address] since your last visit? Include any pap smears or colon screening.  No

## 2018-03-21 LAB
ALBUMIN SERPL-MCNC: 3.9 G/DL (ref 3.6–4.8)
ALP SERPL-CCNC: 114 IU/L (ref 39–117)
ALT SERPL-CCNC: 8 IU/L (ref 0–32)
AST SERPL-CCNC: 13 IU/L (ref 0–40)
BASOPHILS # BLD AUTO: 0 X10E3/UL (ref 0–0.2)
BASOPHILS NFR BLD AUTO: 0 %
BILIRUB DIRECT SERPL-MCNC: 0.12 MG/DL (ref 0–0.4)
BILIRUB SERPL-MCNC: 0.4 MG/DL (ref 0–1.2)
BUN SERPL-MCNC: 10 MG/DL (ref 8–27)
BUN/CREAT SERPL: 15 (ref 12–28)
CALCIUM SERPL-MCNC: 9.1 MG/DL (ref 8.7–10.3)
CHLORIDE SERPL-SCNC: 101 MMOL/L (ref 96–106)
CO2 SERPL-SCNC: 24 MMOL/L (ref 18–29)
CREAT SERPL-MCNC: 0.68 MG/DL (ref 0.57–1)
EOSINOPHIL # BLD AUTO: 0.2 X10E3/UL (ref 0–0.4)
EOSINOPHIL NFR BLD AUTO: 4 %
ERYTHROCYTE [DISTWIDTH] IN BLOOD BY AUTOMATED COUNT: 17.2 % (ref 12.3–15.4)
GFR SERPLBLD CREATININE-BSD FMLA CKD-EPI: 107 ML/MIN/1.73
GFR SERPLBLD CREATININE-BSD FMLA CKD-EPI: 93 ML/MIN/1.73
GLUCOSE SERPL-MCNC: 103 MG/DL (ref 65–99)
HCT VFR BLD AUTO: 39 % (ref 34–46.6)
HGB BLD-MCNC: 12.2 G/DL (ref 11.1–15.9)
IMM GRANULOCYTES # BLD: 0 X10E3/UL (ref 0–0.1)
IMM GRANULOCYTES NFR BLD: 0 %
LYMPHOCYTES # BLD AUTO: 2 X10E3/UL (ref 0.7–3.1)
LYMPHOCYTES NFR BLD AUTO: 34 %
MCH RBC QN AUTO: 27.4 PG (ref 26.6–33)
MCHC RBC AUTO-ENTMCNC: 31.3 G/DL (ref 31.5–35.7)
MCV RBC AUTO: 88 FL (ref 79–97)
MONOCYTES # BLD AUTO: 0.6 X10E3/UL (ref 0.1–0.9)
MONOCYTES NFR BLD AUTO: 10 %
NEUTROPHILS # BLD AUTO: 3.2 X10E3/UL (ref 1.4–7)
NEUTROPHILS NFR BLD AUTO: 52 %
PLATELET # BLD AUTO: 316 X10E3/UL (ref 150–379)
POTASSIUM SERPL-SCNC: 4 MMOL/L (ref 3.5–5.2)
PROT SERPL-MCNC: 7.2 G/DL (ref 6–8.5)
RBC # BLD AUTO: 4.45 X10E6/UL (ref 3.77–5.28)
SODIUM SERPL-SCNC: 142 MMOL/L (ref 134–144)
TACROLIMUS, 700249: 2.4 NG/ML (ref 2–20)
WBC # BLD AUTO: 6 X10E3/UL (ref 3.4–10.8)

## 2018-04-26 ENCOUNTER — OFFICE VISIT (OUTPATIENT)
Dept: HEMATOLOGY | Age: 63
End: 2018-04-26

## 2018-04-26 ENCOUNTER — HOSPITAL ENCOUNTER (OUTPATIENT)
Dept: LAB | Age: 63
Discharge: HOME | End: 2018-04-26

## 2018-04-26 VITALS
TEMPERATURE: 98.5 F | DIASTOLIC BLOOD PRESSURE: 58 MMHG | WEIGHT: 289 LBS | HEIGHT: 63 IN | RESPIRATION RATE: 18 BRPM | BODY MASS INDEX: 51.21 KG/M2 | OXYGEN SATURATION: 97 % | SYSTOLIC BLOOD PRESSURE: 129 MMHG | HEART RATE: 79 BPM

## 2018-04-26 DIAGNOSIS — K75.4 AUTOIMMUNE HEPATITIS (HCC): Primary | ICD-10-CM

## 2018-04-26 PROBLEM — E66.9 OBESITY: Status: RESOLVED | Noted: 2017-05-30 | Resolved: 2018-04-26

## 2018-04-26 PROCEDURE — 99001 SPECIMEN HANDLING PT-LAB: CPT | Performed by: NURSE PRACTITIONER

## 2018-04-26 NOTE — MR AVS SNAPSHOT
[address] [place] [address] 
[phone] Patient: Lavern Wade MRN: [id number] :1955 Visit Information Date & Time Provider Department Dept. Phone Encounter #  
 2018 10:00 AM KAY Saeed [address] of  [address] [id number] Follow-up Instructions Return in about 3 months (around 2018) for [facility] . Upcoming Health Maintenance Date Due Hepatitis C Screening 1955 HEMOGLOBIN A1C Q6M 1955 LIPID PANEL Q1 1955 FOOT EXAM Q1 10/8/1963 MICROALBUMIN Q1 10/8/1963 EYE EXAM RETINAL OR DILATED Q1 10/8/1963 Pneumococcal 19-64 Medium Risk (1 of 1 - PPSV23) 10/8/1972 DTaP/Tdap/Td series (1 - Tdap) 10/8/1974 PAP AKA CERVICAL CYTOLOGY 10/8/1974 BREAST CANCER SCRN MAMMOGRAM 10/8/2003 FOBT Q 1 YEAR AGE 50-75 10/8/2003 ZOSTER VACCINE AGE 60> 2013 Influenza Age 5 to Adult 2017 MEDICARE YEARLY EXAM 3/14/2018 Allergies as of 2018  Review Complete On: 2018 By: Nikky Dawkins No Known Allergies Current Immunizations  Never Reviewed No immunizations on file. Not reviewed this visit You Were Diagnosed With   
  
 Codes Comments [facility]immune hepatitis ([facility]    -  Primary ICD-10-CM: K75.4 ICD-9-CM: 571.42 Vitals BP Pulse Temp Resp Height(growth percentile) 129/58 (BP 1 Location: Right arm, BP Patient Position: Sitting) 79 98.5 °F (36.9 °C) (Tympanic) 18 5' 3\" (1.6 m) Weight(growth percentile) SpO2 BMI OB Status Smoking Status 289 lb (131.1 kg) 97% 51.19 kg/m2 Menopause Never Smoker Vitals History BMI and BSA Data Body Mass Index Body Surface Area  
 51.19 kg/m 2 2.41 m 2 Preferred Pharmacy Pharmacy Name Phone [id number] [place], [address] Your Updated Medication List  
  
   
 This list is accurate as of 4/26/18 10:29 AM.  Always use your most recent med list. amLODIPine 2.5 mg tablet Commonly known as:  NORVASC  
  
 atorvastatin 10 mg tablet Commonly known as:  LIPITOR Take  by mouth daily. doxazosin 2 mg tablet Commonly known as:  CARDURA TAKE 1 TABLET BY MOUTH DAILY  
  
 epinastine 0.05 % Drop  
  
 furosemide 40 mg tablet Commonly known as:  LASIX Take 1 Tab by mouth daily. LANTUS SOLOSTAR U-100 INSULIN 100 unit/mL (3 mL) Inpn Generic drug:  insulin glargine 26 Units daily. * spironolactone 25 mg tablet Commonly known as:  ALDACTONE  
  
 * spironolactone 100 mg tablet Commonly known as:  ALDACTONE Take 1 Tab by mouth daily. tacrolimus 1 mg capsule Commonly known as:  PROGRAF Take 1 Cap by mouth every twelve (12) hours. Indications: autoimmune hepatitis * Notice: This list has 2 medication(s) that are the same as other medications prescribed for you. Read the directions carefully, and ask your doctor or other care provider to review them with you. Follow-up Instructions Return in about 3 months (around 7/26/2018) for [facility] . To-Do List   
 04/26/2018 Lab:  CBC WITH AUTOMATED DIFF   
  
 04/26/2018 Lab:  HEPATIC FUNCTION PANEL   
  
 04/26/2018 Lab:  METABOLIC PANEL, BASIC   
  
 04/26/2018 Lab:  TACROLIMUS, WHOLE BLOOD   
  
 04/26/2018 Imaging:  US ABD LTD W ELASTOGRAPHY Introducing [facility] & HEALTH SERVICES! [employer] introduces SkyKick patient portal. Now you can access parts of your medical record, email your doctor's office, and request medication refills online. 1. In your internet browser, go to [email or web address] 2. Click on the First Time User? Click Here link in the Sign In box. You will see the New Member Sign Up page. 3. Enter your SkyKick Access Code exactly as it appears below.  You will not need to use this code after youve completed the sign-up process. If you do not sign up before the expiration date, you must request a new code. · Blackfoot Access Code: [email or web address] Expires: 5/16/2018 11:44 AM 
 
4. Enter the last four digits of your Social Security Number (xxxx) and Date of Birth (mm/dd/yyyy) as indicated and click Submit. You will be taken to the next sign-up page. 5. Create a Blackfoot ID. This will be your Blackfoot login ID and cannot be changed, so think of one that is secure and easy to remember. 6. Create a Blackfoot password. You can change your password at any time. 7. Enter your Password Reset Question and Answer. This can be used at a later time if you forget your password. 8. Enter your e-mail address. You will receive e-mail notification when new information is available in [address] 9. Click Sign Up. You can now view and download portions of your medical record. 10. Click the Download Summary menu link to download a portable copy of your medical information. If you have questions, please visit the Frequently Asked Questions section of the Blackfoot website. Remember, Blackfoot is NOT to be used for urgent needs. For medical emergencies, dial 911. Now available from your iPhone and Android! Please provide this summary of care documentation to your next provider. Your primary care clinician is listed as Annmarie Candelario. If you have any questions after today's visit, please call [phone].

## 2018-04-26 NOTE — PROGRESS NOTES
Rachael Cole is a 57 y.o. female    No chief complaint on file. 1. Have you been to the ER, urgent care clinic or hospitalized since your last visit? NO.     2. Have you seen or consulted any other health care providers outside of the [address] [place] [address] since your last visit (Include any pap smears or colon screening)?  NO    Learning Assessment 2/15/2018   PRIMARY LEARNER Patient   HIGHEST LEVEL OF EDUCATION - PRIMARY LEARNER  GRADUATED HIGH SCHOOL OR GED   BARRIERS PRIMARY LEARNER NONE   CO-LEARNER CAREGIVER No   PRIMARY LANGUAGE ENGLISH   LEARNER PREFERENCE PRIMARY DEMONSTRATION   ANSWERED BY patient   RELATIONSHIP SELF

## 2018-04-26 NOTE — PROGRESS NOTES
70 Franki Flores MD, [address], [facility], [place]       KAY Otto PA-C Recardo Lorenzo, [facility]   KAY Holbrook NP [address] [facility] 136    at [address], [facility] [place] Horacio Marie  22.    [phone]    FAX: [address] [place] [address], [address]    [phone]    FAX: [phone]       Patient Care Team:  Kevin Yang MD as PCP - General (Family Practice)  Kumar Hay MD (Gastroenterology)      Problem List  Date Reviewed: 4/26/2018          Codes Class Noted    Obesity, morbid [facility]) ICD-10-CM: E66.01  ICD-9-CM: 278.01  2/15/2018        Autoimmune hepatitis ([facility].) ICD-10-CM: K75.4  ICD-9-CM: 571.42  5/30/2017        Diabetes mellitus, type 2 ([facility] ICD-10-CM: E11.9  ICD-9-CM: 250.00  5/30/2017        Edema ICD-10-CM: R60.9  ICD-9-CM: 782.3  5/30/2017        Hypertension ICD-10-CM: I10  ICD-9-CM: 401.9  5/30/2017        Hypercholesterolemia ICD-10-CM: E78.00  ICD-9-CM: 272.0  5/30/2017              Seferino Steele returns to the [address] for management of autoimmune hepatitis. The active problem list, all pertinent past medical history, medications, and laboratory findings related to the liver disorder were reviewed with the patient. The patient is a 57 y.o. Black female who was first noted to have abnormalities in liver transaminases and alkaline phosphase in 3/2017. Further evaluation of these abnormalities has included serologic studies which were positive for ASMA    Ultrasound of the liver was performed in 3/2017. This demonstrated slightly increased echogenicity. A liver biopsy was performed in 3/2017.  This demonstrated severe inflammation and portal fibrosis. The initial AST/ALT were in the 700-800 range. These are now normal. The initial ALP was 350. This is now normal.     Tests of hepatic synthetic and metabolic function are normal, and the platelet count is normal.    The patient notes fatigue, swelling of the lower extremities. The patient has limitations in functional activities secondary to morbid obesity. The patient has not experienced yellowing of the eyes or skin. ALLERGIES  No Known Allergies    MEDICATIONS  Current Outpatient Prescriptions   Medication Sig    atorvastatin (LIPITOR) 10 mg tablet Take  by mouth daily.  tacrolimus (PROGRAF) 1 mg capsule Take 1 Cap by mouth every twelve (12) hours. Indications: autoimmune hepatitis    amLODIPine (NORVASC) 2.5 mg tablet     LANTUS SOLOSTAR 100 unit/mL (3 mL) inpn 26 Units daily.  furosemide (LASIX) 40 mg tablet Take 1 Tab by mouth daily.  spironolactone (ALDACTONE) 100 mg tablet Take 1 Tab by mouth daily. No current facility-administered medications for this visit. SYSTEM REVIEW NOT RELATED TO LIVER DISEASE OR REVIEWED ABOVE:  Constitution systems: morbid obesity  Eyes: Negative for visual changes. ENT: Negative for sore throat, painful swallowing. Respiratory: Negative for cough, hemoptysis, SOB. Cardiology: Negative for chest pain, palpitations. GI:  Negative for constipation or diarrhea. : Negative for urinary frequency, dysuria, hematuria, nocturia. Skin: Negative for rash. Hematology: Negative for easy bruising, blood clots. Musculo-skelatal: Arthritis of knees. Neurologic: Negative for headaches, dizziness, vertigo, memory problems not related to HE. Psychology: Negative for anxiety, depression. FAMILY HISTORY:  The father  of DM. The mother  of Sarcoidosis. There is no family history of liver disease. There is no family history of immune disorders. SOCIAL HISTORY:  The patient is . The patient has 2 children, and 7 grandchildren. The patient has never used tobacco products. The patient consumes alcohol on social occasions never in excess. The patient has been abstinent from alcohol since 3/2017. The patient used to work in [employer] for children. The patient has not worked since 7/2015. PHYSICAL EXAMINATION:  /58 (BP 1 Location: Right arm, BP Patient Position: Sitting)  Pulse 79  Temp 98.5 °F (36.9 °C) (Tympanic)   Resp 18  Ht 5' 3\" (1.6 m)  Wt 289 lb (131.1 kg)  SpO2 97%  BMI 51.19 kg/m2  General: No acute distress. Eyes: Sclera anicteric. ENT: No oral lesions. Thyroid normal.  Nodes: No adenopathy. Skin: No spider angiomata. No jaundice. No palmar erythema. Respiratory: Lungs clear to auscultation. Cardiovascular: Regular heart rate. No murmurs. No JVD. Abdomen: Unable to perform due to habitus  Extremities: No edema. No muscle wasting. No gross arthritic changes. Neurologic: Alert and oriented. Cranial nerves grossly intact. No asterixsis. LABORATORY STUDIES:  Liver [place] of [address] Units 3/19/2018 2/15/2018   WBC 3.4 - 10.8 x10E3/uL 6.0 7.1   ANC 1.4 - 7.0 x10E3/uL 3.2 3.3   HGB 11.1 - 15.9 g/dL 12.2 12.0    - 379 x10E3/uL 316 313   AST 0 - 40 IU/L 13 13 (L)   ALT 0 - 32 IU/L 8 13   Alk Phos 39 - 117 IU/L 114 95   Bili, Total 0.0 - 1.2 mg/dL 0.4 0.6   Bili, Direct 0.00 - 0.40 mg/dL 0.12 0.1   Albumin 3.6 - 4.8 g/dL 3.9 3.3 (L)   BUN 8 - 27 mg/dL 10 8   Creat 0.57 - 1.00 mg/dL 0.68 0.80   Na 134 - 144 mmol/L 142 142   K 3.5 - 5.2 mmol/L 4.0 4.1   Cl 96 - 106 mmol/L 101 106   CO2 18 - 29 mmol/L 24 26   Glucose 65 - 99 mg/dL 103 (H) 113 (H)     TACROLIMUS LEVEL:  10/2017- 2.2  11/2017- 1.7  2/2018- 4.0   3/2018 - 2.4    SEROLOGIES:  3/2017. HBsAntigen negative, anti-HCV negative, Ferritin 239, iron saturation 11%, KAYLEE negative, ASMA positive 1:320,     LIVER HISTOLOGY:  3/2017.   Severe active hepatitis with PMN and portal fibrosis. Biopsy slides not reviewed by MLS. ENDOSCOPIC PROCEDURES:  Not available or performed    RADIOLOGY:  3/2017. Ultrasound of liver. Mild increased echogenicity. No liver mass lesions. No dilated bile ducts. No ascites. 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  Alkaline phosphate is normal.  Liver function is normal.  The platelet count is normal.      The patient is receiving treatment with Prograf 1 mg twice daily. Patient has been off Prednisone since 11/2017. And off of azathioprine 50 mg twice daily since 3/2018. The patient was directed to continue all other medications at the current dosages. There are no contraindications for the patient to take any medications that are necessary for treatment of other medical issues. The patient was counseled regarding alcohol consumption. Vaccination for viral hepatitis A and B is recommended since the patient has no serologic evidence of previous exposure or vaccination with immunity. BMP, LFT, CBC and tacrolimus level ordered today. All of the above issues were discussed with the patient. All questions were answered. The patient expressed a clear understanding of the above. [address] [address] in 3 month to determine ongoing treatment plan.        Wali Salmeron NP  [address]    [address] [place] [address], [address] [place] [address] Kasandra Manley, [address]  [phone]

## 2018-04-28 LAB
ALBUMIN SERPL-MCNC: 3.8 G/DL (ref 3.6–4.8)
ALP SERPL-CCNC: 100 IU/L (ref 39–117)
ALT SERPL-CCNC: 6 IU/L (ref 0–32)
AST SERPL-CCNC: 12 IU/L (ref 0–40)
BASOPHILS # BLD AUTO: 0 X10E3/UL (ref 0–0.2)
BASOPHILS NFR BLD AUTO: 1 %
BILIRUB DIRECT SERPL-MCNC: 0.14 MG/DL (ref 0–0.4)
BILIRUB SERPL-MCNC: 0.5 MG/DL (ref 0–1.2)
BUN SERPL-MCNC: 9 MG/DL (ref 8–27)
BUN/CREAT SERPL: 15 (ref 12–28)
CALCIUM SERPL-MCNC: 9 MG/DL (ref 8.7–10.3)
CHLORIDE SERPL-SCNC: 101 MMOL/L (ref 96–106)
CO2 SERPL-SCNC: 24 MMOL/L (ref 18–29)
CREAT SERPL-MCNC: 0.6 MG/DL (ref 0.57–1)
EOSINOPHIL # BLD AUTO: 0.2 X10E3/UL (ref 0–0.4)
EOSINOPHIL NFR BLD AUTO: 3 %
ERYTHROCYTE [DISTWIDTH] IN BLOOD BY AUTOMATED COUNT: 15.9 % (ref 12.3–15.4)
GFR SERPLBLD CREATININE-BSD FMLA CKD-EPI: 111 ML/MIN/1.73
GFR SERPLBLD CREATININE-BSD FMLA CKD-EPI: 97 ML/MIN/1.73
GLUCOSE SERPL-MCNC: 122 MG/DL (ref 65–99)
HCT VFR BLD AUTO: 37.9 % (ref 34–46.6)
HGB BLD-MCNC: 12.4 G/DL (ref 11.1–15.9)
IMM GRANULOCYTES # BLD: 0 X10E3/UL (ref 0–0.1)
IMM GRANULOCYTES NFR BLD: 0 %
LYMPHOCYTES # BLD AUTO: 3 X10E3/UL (ref 0.7–3.1)
LYMPHOCYTES NFR BLD AUTO: 45 %
MCH RBC QN AUTO: 27.9 PG (ref 26.6–33)
MCHC RBC AUTO-ENTMCNC: 32.7 G/DL (ref 31.5–35.7)
MCV RBC AUTO: 85 FL (ref 79–97)
MONOCYTES # BLD AUTO: 0.7 X10E3/UL (ref 0.1–0.9)
MONOCYTES NFR BLD AUTO: 11 %
NEUTROPHILS # BLD AUTO: 2.7 X10E3/UL (ref 1.4–7)
NEUTROPHILS NFR BLD AUTO: 40 %
PLATELET # BLD AUTO: 272 X10E3/UL (ref 150–379)
POTASSIUM SERPL-SCNC: 3.8 MMOL/L (ref 3.5–5.2)
PROT SERPL-MCNC: 7.4 G/DL (ref 6–8.5)
RBC # BLD AUTO: 4.45 X10E6/UL (ref 3.77–5.28)
SODIUM SERPL-SCNC: 141 MMOL/L (ref 134–144)
TACROLIMUS, 700249: 2.2 NG/ML (ref 2–20)
WBC # BLD AUTO: 6.7 X10E3/UL (ref 3.4–10.8)

## 2018-05-13 RX ORDER — SPIRONOLACTONE 100 MG/1
TABLET, FILM COATED ORAL
Qty: 90 TAB | Refills: 3 | Status: SHIPPED | OUTPATIENT
Start: 2018-05-13 | End: 2019-11-21 | Stop reason: SDUPTHER

## 2018-05-17 ENCOUNTER — HOSPITAL ENCOUNTER (OUTPATIENT)
Dept: ULTRASOUND IMAGING | Age: 63
Discharge: HOME | End: 2018-05-17
Attending: NURSE PRACTITIONER
Payer: MEDICARE

## 2018-05-17 DIAGNOSIS — K75.4 AUTOIMMUNE HEPATITIS (HCC): ICD-10-CM

## 2018-05-17 PROCEDURE — 0346T US ABD LTD W ELASTOGRAPHY: CPT

## 2018-05-26 ENCOUNTER — APPOINTMENT (OUTPATIENT)
Dept: GENERAL RADIOLOGY | Age: 63
End: 2018-05-26
Attending: EMERGENCY MEDICINE
Payer: MEDICARE

## 2018-05-26 ENCOUNTER — HOSPITAL ENCOUNTER (EMERGENCY)
Age: 63
Discharge: HOME | End: 2018-05-26
Attending: EMERGENCY MEDICINE
Payer: MEDICARE

## 2018-05-26 VITALS
RESPIRATION RATE: 16 BRPM | TEMPERATURE: 98 F | SYSTOLIC BLOOD PRESSURE: 166 MMHG | WEIGHT: 289 LBS | HEART RATE: 85 BPM | DIASTOLIC BLOOD PRESSURE: 59 MMHG | BODY MASS INDEX: 51.21 KG/M2 | OXYGEN SATURATION: 96 % | HEIGHT: 63 IN
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
## 2018-05-26 DIAGNOSIS — S80.02XA CONTUSION OF LEFT KNEE, INITIAL ENCOUNTER: ICD-10-CM

## 2018-05-26 DIAGNOSIS — M79.605 LEFT LEG PAIN: ICD-10-CM

## 2018-05-26 DIAGNOSIS — M79.604 RIGHT LEG PAIN: ICD-10-CM

## 2018-05-26 DIAGNOSIS — S80.01XA CONTUSION OF RIGHT KNEE, INITIAL ENCOUNTER: ICD-10-CM

## 2018-05-26 DIAGNOSIS — M25.531 RIGHT WRIST PAIN: ICD-10-CM

## 2018-05-26 PROCEDURE — 73552 X-RAY EXAM OF FEMUR 2/>: CPT

## 2018-05-26 PROCEDURE — 73560 X-RAY EXAM OF KNEE 1 OR 2: CPT

## 2018-05-26 PROCEDURE — 73130 X-RAY EXAM OF HAND: CPT

## 2018-05-26 PROCEDURE — 74011250637 HC RX REV CODE- 250/637: Performed by: EMERGENCY MEDICINE

## 2018-05-26 PROCEDURE — 99283 EMERGENCY DEPT VISIT LOW MDM: CPT

## 2018-05-26 RX ORDER — OXYCODONE AND ACETAMINOPHEN 5; 325 MG/1; MG/1
2 TABLET ORAL
Status: COMPLETED | OUTPATIENT
Start: 2018-05-26 | End: 2018-05-26

## 2018-05-26 RX ORDER — OXYCODONE AND ACETAMINOPHEN 5; 325 MG/1; MG/1
1 TABLET ORAL
Qty: 12 TAB | Refills: 0 | Status: SHIPPED | OUTPATIENT
Start: 2018-05-26 | End: 2018-07-26

## 2018-05-26 RX ADMIN — OXYCODONE HYDROCHLORIDE AND ACETAMINOPHEN 2 TABLET: 5; 325 TABLET ORAL at 17:08

## 2018-05-26 NOTE — ED TRIAGE NOTES
Pt states her grandson was pushing her outside in wheelchair when she leaned forward and fell out of chair. Pt fell onto knees and upper body;  States struck her head on ground; No LOC; Pt with no obvious trauma;   No abrasions noted to face;  Pt c/o bilat lower leg pain, bilat knee pain, head pain;

## 2018-05-26 NOTE — DISCHARGE INSTRUCTIONS
Contusion: Care Instructions  Your Care Instructions  Contusion is the medical term for a bruise. It is the result of a direct blow or an impact, such as a fall. Contusions are common sports injuries. Most people think of a bruise as a black-and-blue spot. This happens when small blood vessels get torn and leak blood under the skin. But bones, muscles, and organs can also get bruised. This may damage deep tissues but not cause a bruise you can see. The doctor will do a physical exam to find the location of your contusion. You may also have tests to make sure you do not have a more serious injury, such as a broken bone or nerve damage. These may include X-rays or other imaging tests like a CT scan or MRI. Deep-tissue contusions may cause pain and swelling. But if there is no serious damage, they will often get better in a few weeks with home treatment. The doctor has checked you carefully, but problems can develop later. If you notice any problems or new symptoms, get medical treatment right away. Follow-up care is a key part of your treatment and safety. Be sure to make and go to all appointments, and call your doctor if you are having problems. It's also a good idea to know your test results and keep a list of the medicines you take. How can you care for yourself at home? · Put ice or a cold pack on the sore area for 10 to 20 minutes at a time to stop swelling. Put a thin cloth between the ice pack and your skin. · Be safe with medicines. Read and follow all instructions on the label. ¨ If the doctor gave you a prescription medicine for pain, take it as prescribed. ¨ If you are not taking a prescription pain medicine, ask your doctor if you can take an over-the-counter medicine. · If you can, prop up the sore area on pillows as much as possible for the next few days. Try to keep the sore area above the level of your heart. When should you call for help?   Call your doctor now or seek immediate medical care if:  · Your pain gets worse. · You have new or worse swelling. · You have tingling, weakness, or numbness in the area near the contusion. · The area near the contusion is cold or pale. Watch closely for changes in your health, and be sure to contact your doctor if:  · You do not get better as expected. Where can you learn more? Go to [employer]  Enter [id number] in the search box to learn more about \"Contusion: Care Instructions. \"   © 3248-5722 [employer]. Care instructions adapted under license by Latanya Davis (which disclaims liability or warranty for this information). This care instruction is for use with your licensed healthcare professional. If you have questions about a medical condition or this instruction, always ask your healthcare professional. [address] any warranty or liability for your use of this information. Content Version: 88.1.914835; Current as of: May 22, 2015           Knee Pain or Injury: Care Instructions  Your Care Instructions    Injuries are a common cause of knee problems. Sudden (acute) injuries may be caused by a direct blow to the knee. They can also be caused by abnormal twisting, bending, or falling on the knee. Pain, bruising, or swelling may be severe, and may start within minutes of the injury. Overuse is another cause of knee pain. Other causes are climbing stairs, kneeling, and other activities that use the knee. Everyday wear and tear, especially as you get older, also can cause knee pain. Rest, along with home treatment, often relieves pain and allows your knee to heal. If you have a serious knee injury, you may need tests and treatment. Follow-up care is a key part of your treatment and safety. Be sure to make and go to all appointments, and call your doctor if you are having problems. It's also a good idea to know your test results and keep a list of the medicines you take.   How can you care for yourself at home? · Be safe with medicines. Read and follow all instructions on the label. ¨ If the doctor gave you a prescription medicine for pain, take it as prescribed. ¨ If you are not taking a prescription pain medicine, ask your doctor if you can take an over-the-counter medicine. · Rest and protect your knee. Take a break from any activity that may cause pain. · Put ice or a cold pack on your knee for 10 to 20 minutes at a time. Put a thin cloth between the ice and your skin. · Prop up a sore knee on a pillow when you ice it or anytime you sit or lie down for the next 3 days. Try to keep it above the level of your heart. This will help reduce swelling. · If your knee is not swollen, you can put moist heat, a heating pad, or a warm cloth on your knee. · If your doctor recommends an elastic bandage, sleeve, or other type of support for your knee, wear it as directed. · Follow your doctor's instructions about how much weight you can put on your leg. Use a cane, crutches, or a walker as instructed. · Follow your doctor's instructions about activity during your healing process. If you can do mild exercise, slowly increase your activity. · Reach and stay at a healthy weight. Extra weight can strain the joints, especially the knees and hips, and make the pain worse. Losing even a few pounds may help. When should you call for help? Call 911 anytime you think you may need emergency care. For example, call if:  ? · You have symptoms of a blood clot in your lung (called a pulmonary embolism). These may include:  ¨ Sudden chest pain. ¨ Trouble breathing. ¨ Coughing up blood. ?Call your doctor now or seek immediate medical care if:  ? · You have severe or increasing pain. ? · Your leg or foot turns cold or changes color. ? · You cannot stand or put weight on your knee. ? · Your knee looks twisted or bent out of shape. ? · You cannot move your knee.    ? · You have signs of infection, such as:  ¨ Increased pain, swelling, warmth, or redness. ¨ Red streaks leading from the knee. ¨ Pus draining from a place on your knee. ¨ A fever. ? · You have signs of a blood clot in your leg (called a deep vein thrombosis), such as:  ¨ Pain in your calf, back of the knee, thigh, or groin. ¨ Redness and swelling in your leg or groin. ? Watch closely for changes in your health, and be sure to contact your doctor if:  ? · You have tingling, weakness, or numbness in your knee. ? · You have any new symptoms, such as swelling. ? · You have bruises from a knee injury that last longer than 2 weeks. ? · You do not get better as expected. Where can you learn more? Go to http://gautam-edilberto.info/. Enter K195 in the search box to learn more about \"Knee Pain or Injury: Care Instructions. \"  Current as of: March 20, 2017  Content Version: 11.4  © 7197-6679 [employer]. Care instructions adapted under license by [employer] (which disclaims liability or warranty for this information). If you have questions about a medical condition or this instruction, always ask your healthcare professional. [address] [address] any warranty or liability for your use of this information.

## 2018-05-26 NOTE — ED NOTES
Pt discharged per w/c, no acute distress on discharge, written inst given to pt with rx x 1, pt verbalizes understanding  Patient armband removed and shredded

## 2018-07-07 RX ORDER — FUROSEMIDE 40 MG/1
TABLET ORAL
Qty: 90 TAB | Refills: 3 | Status: SHIPPED | OUTPATIENT
Start: 2018-07-07 | End: 2019-04-29 | Stop reason: SDUPTHER

## 2018-07-26 ENCOUNTER — HOSPITAL ENCOUNTER (OUTPATIENT)
Dept: LAB | Age: 63
Discharge: HOME | End: 2018-07-26

## 2018-07-26 ENCOUNTER — OFFICE VISIT (OUTPATIENT)
Dept: HEMATOLOGY | Age: 63
End: 2018-07-26

## 2018-07-26 VITALS
WEIGHT: 283.2 LBS | TEMPERATURE: 97.3 F | SYSTOLIC BLOOD PRESSURE: 147 MMHG | BODY MASS INDEX: 50.18 KG/M2 | DIASTOLIC BLOOD PRESSURE: 71 MMHG | RESPIRATION RATE: 16 BRPM | HEART RATE: 76 BPM | OXYGEN SATURATION: 99 % | HEIGHT: 63 IN

## 2018-07-26 DIAGNOSIS — K75.4 AUTOIMMUNE HEPATITIS (HCC): Primary | ICD-10-CM

## 2018-07-26 PROCEDURE — 99001 SPECIMEN HANDLING PT-LAB: CPT | Performed by: NURSE PRACTITIONER

## 2018-07-26 RX ORDER — ASPIRIN 81 MG/1
TABLET ORAL DAILY
COMMUNITY

## 2018-07-26 NOTE — PROGRESS NOTES
1. Have you been to the ER, urgent care clinic since your last visit? Hospitalized since your last visit? Yes When: 7/2018 [place], Chest pain. 2. Have you seen or consulted any other health care providers outside of the [address] [place] [address] since your last visit? Include any pap smears or colon screening. Yes When: 7/2018 Pcp visit.

## 2018-07-26 NOTE — PROGRESS NOTES
70 Franki Flores MD, [address], [place], [place]       KAY Peres, EROS Ray, ACNP-BC   KAY Garcia NP [facility] 136    at [address], [address] Hor[address] Marie  22.    [phone]    FAX: [address] [place] [address]    at [address], [address]    [phone]    FAX: [phone]       Patient Care Team:  Gunnar Brady MD as PCP - General (Family Practice)  Vero Madison MD (Gastroenterology)      Problem List  Date Reviewed: 7/26/2018          Codes Class Noted    Obesity, morbid [facility]) ICD-10-CM: E66.01  ICD-9-CM: 278.01  2/15/2018        Autoimmune hepatitis ([facility] ICD-10-CM: K75.4  ICD-9-CM: 571.42  5/30/2017        Diabetes mellitus, type 2 ([facility] ICD-10-CM: E11.9  ICD-9-CM: 250.00  5/30/2017        Edema ICD-10-CM: R60.9  ICD-9-CM: 782.3  5/30/2017        Hypertension ICD-10-CM: I10  ICD-9-CM: 401.9  5/30/2017        Hypercholesterolemia ICD-10-CM: E78.00  ICD-9-CM: 272.0  5/30/2017              Natan Macias returns to the [address] for management of autoimmune hepatitis. The active problem list, all pertinent past medical history, medications, and laboratory findings related to the liver disorder were reviewed with the patient. The patient is a 57 y.o. Black female who was first noted to have abnormalities in liver transaminases and alkaline phosphase in 3/2017. Further evaluation of these abnormalities has included serologic studies which were positive for ASMA    Ultrasound of the liver was performed in 3/2017. This demonstrated slightly increased echogenicity. A liver biopsy was performed in 3/2017.  This demonstrated severe inflammation and portal fibrosis. The initial AST/ALT were in the 700-800 range. These are now normal. The initial ALP was 350. This is now normal.     Tests of hepatic synthetic and metabolic function are normal, and the platelet count is normal.    The patient notes swelling of the lower extremities. The patient has not experienced pain in the right side over the liver, yellowing of the eyes or skin, problems concentrating. The patient has moderate limitations in functional activities which can be attributed to other medical problems that are not related to the liver disease. ALLERGIES  No Known Allergies    MEDICATIONS  Current Outpatient Prescriptions   Medication Sig    aspirin delayed-release 81 mg tablet Take  by mouth daily.  furosemide (LASIX) 40 mg tablet TAKE 1 TABLET EVERY DAY    spironolactone (ALDACTONE) 100 mg tablet TAKE 1 TABLET EVERY DAY    tacrolimus (PROGRAF) 1 mg capsule Take 1 Cap by mouth every twelve (12) hours. Indications: autoimmune hepatitis    amLODIPine (NORVASC) 2.5 mg tablet     LANTUS SOLOSTAR 100 unit/mL (3 mL) inpn 26 Units daily. No current facility-administered medications for this visit. SYSTEM REVIEW NOT RELATED TO LIVER DISEASE OR REVIEWED ABOVE:  Constitution systems: morbid obesity  Eyes: Negative for visual changes. ENT: Negative for sore throat, painful swallowing. Respiratory: Negative for cough, hemoptysis, SOB. Cardiology: Negative for chest pain, palpitations. GI:  Negative for constipation or diarrhea. : Negative for urinary frequency, dysuria, hematuria, nocturia. Skin: Negative for rash. Hematology: Negative for easy bruising, blood clots. Musculo-skelatal: Arthritis of knees. Neurologic: Negative for headaches, dizziness, vertigo, memory problems not related to HE. Psychology: Negative for anxiety, depression. FAMILY HISTORY:  The father  of DM. The mother  of Sarcoidosis.     There is no family history of liver disease. There is no family history of immune disorders. SOCIAL HISTORY:  The patient is . The patient has 2 children, and 7 grandchildren. The patient has never used tobacco products. The patient consumes alcohol on social occasions never in excess. The patient has been abstinent from alcohol since 3/2017. The patient used to work in [employer] for children. The patient has not worked since 7/2015. PHYSICAL EXAMINATION:  Visit Vitals    /71 (BP 1 Location: Left arm, BP Patient Position: Sitting)    Pulse 76    Temp 97.3 °F (36.3 °C) (Tympanic)    Resp 16    Ht 5' 3\" (1.6 m)    Wt 283 lb 3.2 oz (128.5 kg)    SpO2 99%    BMI 50.17 kg/m2     General: No acute distress. Eyes: Sclera anicteric. ENT: No oral lesions. Thyroid normal.  Nodes: No adenopathy. Skin: No spider angiomata. No jaundice. No palmar erythema. Respiratory: Lungs clear to auscultation. Cardiovascular: Regular heart rate. No murmurs. No JVD. Abdomen: Unable to perform due to habitus  Extremities: No edema. No muscle wasting. No gross arthritic changes. Neurologic: Alert and oriented. Cranial nerves grossly intact. No asterixsis.     LABORATORY STUDIES:  Liver [place] of [address] [place] [address] 4/26/2018 3/19/2018   WBC 3.4 - 10.8 x10E3/uL 6.7 6.0   ANC 1.4 - 7.0 x10E3/uL 2.7 3.2   HGB 11.1 - 15.9 g/dL 12.4 12.2    - 379 x10E3/uL 272 316   AST 0 - 40 IU/L 12 13   ALT 0 - 32 IU/L 6 8   Alk Phos 39 - 117 IU/L 100 114   Bili, Total 0.0 - 1.2 mg/dL 0.5 0.4   Bili, Direct 0.00 - 0.40 mg/dL 0.14 0.12   Albumin 3.6 - 4.8 g/dL 3.8 3.9   BUN 8 - 27 mg/dL 9 10   Creat 0.57 - 1.00 mg/dL 0.60 0.68   Na 134 - 144 mmol/L 141 142   K 3.5 - 5.2 mmol/L 3.8 4.0   Cl 96 - 106 mmol/L 101 101   CO2 18 - 29 mmol/L 24 24   Glucose 65 - 99 mg/dL 122 (H) 103 (H)     TACROLIMUS LEVEL:  Liver [facility] Trans[facility] Immune Suppression Latest Ref Rng & Units 4/26/2018   Tacrolimus Level 2.0 - 20.0 ng/mL 2.2     SEROLOGIES:  3/2017. HBsAntigen negative, anti-HCV negative, Ferritin 239, iron saturation 11%, KAYLEE negative, ASMA positive 1:320,     LIVER HISTOLOGY:  3/2017. Severe active hepatitis with PMN and portal fibrosis. Biopsy slides not reviewed by MLS.  5/2018. Sheer wave elastography performed at [facility]. EkPa was E Range: 4.24-9.41 kPa, E Mean: 6.39 kPa, E Median: 6.10 kPa, E Std: 1.54 kPa. The results suggested a fibrosis level of F1.    ENDOSCOPIC PROCEDURES:  Not available or performed    RADIOLOGY:  3/2017. Ultrasound of liver. Mild increased echogenicity. No liver mass lesions. No dilated bile ducts. No ascites. 5/2018. Ultrasound of the liver. Mild increased echogenicity. No liver mass lesions. No dilated bile ducts. No ascites. 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  Alkaline phosphate is normal.  Liver function is normal.  The platelet count is normal.      The patient is receiving treatment with Prograf 1 mg twice daily. The patient was directed to continue all other medications at the current dosages. There are no contraindications for the patient to take any medications that are necessary for treatment of other medical issues. The need to assess liver fibrosis was discussed. Sheer wave elastography can assess liver fibrosis and determine if a patient has advanced fibrosis or cirrhosis without the need for liver biopsy. Sheer wave elastography is now available at [facility]. Elastography can be repeated annually to demonstrate that the treatment is resolving hepatic fibrosis. The patient was counseled regarding alcohol consumption. Vaccination for viral hepatitis A and B is recommended since the patient has no serologic evidence of previous exposure or vaccination with immunity.     Will perform laboratory testing to monitor liver function and degree of liver injury. This will include BMP, hepatic panel, CBC with platelet count. All of the above issues were discussed with the patient. All questions were answered. The patient expressed a clear understanding of the above. [address] [address] in 4 months.          AKY Lopez [address] [place] [address] [address]  [address] [place] [address], [address]  [phone]

## 2018-07-26 NOTE — MR AVS SNAPSHOT
[address] [address] 
[phone] Patient: Natan Macias MRN: [id number] :1955 Visit Information Date & Time Provider Department Dept. Phone Encounter #  
 2018 10:00 AM KAY Banegasluis [address] [id number] Follow-up Instructions Return in about 4 months (around 2018) for Samantha . Your Appointments 2018 10:00 AM  
[place] [address] [facility] ([facility]) Appt Note: 3mnth f/u  
 1200 [facility], [address], [address] [place] [address] Upcoming Health Maintenance Date Due Hepatitis C Screening 1955 HEMOGLOBIN A1C Q6M 1955 LIPID PANEL Q1 1955 FOOT EXAM Q1 10/8/1963 MICROALBUMIN Q1 10/8/1963 EYE EXAM RETINAL OR DILATED Q1 10/8/1963 Pneumococcal 19-64 Medium Risk (1 of 1 - PPSV23) 10/8/1972 DTaP/Tdap/Td series (1 - Tdap) 10/8/1974 PAP AKA CERVICAL CYTOLOGY 10/8/1974 BREAST CANCER SCRN MAMMOGRAM 10/8/2003 FOBT Q 1 YEAR AGE 50-75 10/8/2003 ZOSTER VACCINE AGE 60> 2013 MEDICARE YEARLY EXAM 3/14/2018 Bone Densitometry (Dexa) Screening 10/8/2018 Influenza Age 5 to Adult 2018 Allergies as of 2018  Review Complete On: 2018 By: Tania Payne No Known Allergies Current Immunizations  Never Reviewed No immunizations on file. Not reviewed this visit Vitals BP Pulse Temp Resp Height(growth percentile) 147/71 (BP 1 Location: Left arm, BP Patient Position: Sitting) 76 97.3 °F (36.3 °C) (Tympanic) 16 5' 3\" (1.6 m) Weight(growth percentile) SpO2 BMI OB Status Smoking Status 283 lb 3.2 oz (128.5 kg) 99% 50.17 kg/m2 Menopause Never Smoker Vitals History BMI and BSA Data Body Mass Index Body Surface Area  
 50.17 kg/m 2 2.39 m 2 Preferred Pharmacy Pharmacy Name Phone [facility] [address] [place] [address] [phone] Your Updated Medication List  
  
   
This list is accurate as of 7/26/18  9:53 AM.  Always use your most recent med list. amLODIPine 2.5 mg tablet Commonly known as:  NORVASC  
  
 aspirin delayed-release 81 mg tablet Take  by mouth daily. atorvastatin 10 mg tablet Commonly known as:  LIPITOR Take  by mouth daily. furosemide 40 mg tablet Commonly known as:  LASIX TAKE 1 TABLET EVERY DAY  
  
 LANTUS SOLOSTAR U-100 INSULIN 100 unit/mL (3 mL) Inpn Generic drug:  insulin glargine 26 Units daily. oxyCODONE-acetaminophen 5-325 mg per tablet Commonly known as:  PERCOCET Take 1 Tab by mouth every four (4) hours as needed for Pain. Max Daily Amount: 6 Tabs. spironolactone 100 mg tablet Commonly known as:  ALDACTONE  
TAKE 1 TABLET EVERY DAY  
  
 tacrolimus 1 mg capsule Commonly known as:  PROGRAF Take 1 Cap by mouth every twelve (12) hours. Indications: autoimmune hepatitis Follow-up Instructions Return in about 4 months (around 11/26/2018) for Samantha . Introducing [facility] & HEALTH SERVICES! [employer] introduces [employer] patient portal. Now you can access parts of your medical record, email your doctor's office, and request medication refills online. 1. In your internet browser, go to [email or web address] 2. Click on the First Time User? Click Here link in the Sign In box. You will see the New Member Sign Up page. 3. Enter your [employer] Access Code exactly as it appears below. You will not need to use this code after youve completed the sign-up process. If you do not sign up before the expiration date, you must request a new code. · [employer] Access Code: [email or web address] Expires: 8/24/2018  4:23 PM 
 
 4. Enter the last four digits of your Social Security Number (xxxx) and Date of Birth (mm/dd/yyyy) as indicated and click Submit. You will be taken to the next sign-up page. 5. Create a [employer] ID. This will be your [employer] login ID and cannot be changed, so think of one that is secure and easy to remember. 6. Create a [employer] password. You can change your password at any time. 7. Enter your Password Reset Question and Answer. This can be used at a later time if you forget your password. 8. Enter your e-mail address. You will receive e-mail notification when new information is available in [address] 9. Click Sign Up. You can now view and download portions of your medical record. 10. Click the Download Summary menu link to download a portable copy of your medical information. If you have questions, please visit the Frequently Asked Questions section of the [employer] website. Remember, [employer] is NOT to be used for urgent needs. For medical emergencies, dial 911. Now available from your iPhone and Android! Please provide this summary of care documentation to your next provider. Your primary care clinician is listed as Edison Granger. If you have any questions after today's visit, please call [phone].

## 2018-07-29 LAB
ALBUMIN SERPL-MCNC: 4 G/DL (ref 3.6–4.8)
ALP SERPL-CCNC: 113 IU/L (ref 39–117)
ALT SERPL-CCNC: 9 IU/L (ref 0–32)
AST SERPL-CCNC: 12 IU/L (ref 0–40)
BASOPHILS # BLD AUTO: 0 X10E3/UL (ref 0–0.2)
BASOPHILS NFR BLD AUTO: 0 %
BILIRUB DIRECT SERPL-MCNC: 0.13 MG/DL (ref 0–0.4)
BILIRUB SERPL-MCNC: 0.5 MG/DL (ref 0–1.2)
BUN SERPL-MCNC: 11 MG/DL (ref 8–27)
BUN/CREAT SERPL: 18 (ref 12–28)
CALCIUM SERPL-MCNC: 9.1 MG/DL (ref 8.7–10.3)
CHLORIDE SERPL-SCNC: 104 MMOL/L (ref 96–106)
CO2 SERPL-SCNC: 23 MMOL/L (ref 20–29)
CREAT SERPL-MCNC: 0.6 MG/DL (ref 0.57–1)
EOSINOPHIL # BLD AUTO: 0.3 X10E3/UL (ref 0–0.4)
EOSINOPHIL NFR BLD AUTO: 3 %
ERYTHROCYTE [DISTWIDTH] IN BLOOD BY AUTOMATED COUNT: 17.1 % (ref 12.3–15.4)
GLUCOSE SERPL-MCNC: 107 MG/DL (ref 65–99)
HCT VFR BLD AUTO: 39.6 % (ref 34–46.6)
HGB BLD-MCNC: 12.8 G/DL (ref 11.1–15.9)
IMM GRANULOCYTES # BLD: 0 X10E3/UL (ref 0–0.1)
IMM GRANULOCYTES NFR BLD: 0 %
LYMPHOCYTES # BLD AUTO: 2.7 X10E3/UL (ref 0.7–3.1)
LYMPHOCYTES NFR BLD AUTO: 35 %
MCH RBC QN AUTO: 27.2 PG (ref 26.6–33)
MCHC RBC AUTO-ENTMCNC: 32.3 G/DL (ref 31.5–35.7)
MCV RBC AUTO: 84 FL (ref 79–97)
MONOCYTES # BLD AUTO: 0.4 X10E3/UL (ref 0.1–0.9)
MONOCYTES NFR BLD AUTO: 6 %
NEUTROPHILS # BLD AUTO: 4.3 X10E3/UL (ref 1.4–7)
NEUTROPHILS NFR BLD AUTO: 56 %
PLATELET # BLD AUTO: 274 X10E3/UL (ref 150–379)
POTASSIUM SERPL-SCNC: 4.5 MMOL/L (ref 3.5–5.2)
PROT SERPL-MCNC: 7.7 G/DL (ref 6–8.5)
RBC # BLD AUTO: 4.71 X10E6/UL (ref 3.77–5.28)
SODIUM SERPL-SCNC: 143 MMOL/L (ref 134–144)
TACROLIMUS, 700249: NORMAL NG/ML (ref 2–20)
WBC # BLD AUTO: 7.7 X10E3/UL (ref 3.4–10.8)

## 2018-11-19 ENCOUNTER — OFFICE VISIT (OUTPATIENT)
Dept: HEMATOLOGY | Age: 63
End: 2018-11-19

## 2018-11-19 VITALS
SYSTOLIC BLOOD PRESSURE: 192 MMHG | OXYGEN SATURATION: 98 % | WEIGHT: 284 LBS | BODY MASS INDEX: 50.32 KG/M2 | HEIGHT: 63 IN | RESPIRATION RATE: 18 BRPM | TEMPERATURE: 99.3 F | DIASTOLIC BLOOD PRESSURE: 74 MMHG | HEART RATE: 74 BPM

## 2018-11-19 DIAGNOSIS — K75.4 AUTOIMMUNE HEPATITIS (HCC): Primary | ICD-10-CM

## 2018-11-19 RX ORDER — PRAVASTATIN SODIUM 80 MG/1
80 TABLET ORAL
COMMUNITY
Start: 2018-09-11 | End: 2019-09-11

## 2018-11-19 RX ORDER — TACROLIMUS 1 MG/1
1 CAPSULE ORAL EVERY 12 HOURS
Qty: 180 CAP | Refills: 3 | Status: SHIPPED | OUTPATIENT
Start: 2018-11-19 | End: 2019-09-04 | Stop reason: SDUPTHER

## 2018-11-19 NOTE — PROGRESS NOTES
245 [facility] [address], [place], 6350 [facility], [place], [place]       Emmie Gross, EROS Rondon, ACOSTA-BC   KAY Kwok NP [address] [facility] 136    at [address]    217 [facility], [address] [place] [address], Horacio  22.    [phone]    FAX: [address] [place] [address], [address] [place] [address], [address]    [phone]    FAX: [phone]     Patient Care Team:  Kayleigh Palomares MD as PCP - General (Family Practice)  Atif Michaels MD (Gastroenterology)      Problem List  Date Reviewed: 7/26/2018          Codes Class Noted    Obesity, morbid [facility]) ICD-10-CM: E66.01  ICD-9-CM: 278.01  2/15/2018        Autoimmune hepatitis ([facility] ICD-10-CM: K75.4  ICD-9-CM: 571.42  5/30/2017        Diabetes mellitus, type 2 ([address] ICD-10-CM: E11.9  ICD-9-CM: 250.00  5/30/2017        Edema ICD-10-CM: R60.9  ICD-9-CM: 782.3  5/30/2017        Hypertension ICD-10-CM: I10  ICD-9-CM: 401.9  5/30/2017        Hypercholesterolemia ICD-10-CM: E78.00  ICD-9-CM: 272.0  5/30/2017              Geovanna Divers returns to the [facility] [place] for management of autoimmune hepatitis. The active problem list, all pertinent past medical history, medications, and laboratory findings related to the liver disorder were reviewed with the patient. The patient is a 70 y.o. Black female who was first noted to have abnormalities in liver transaminases and alkaline phosphase in 3/2017. Further evaluation of these abnormalities has included serologic studies which were positive for ASMA    Ultrasound of the liver was performed in 3/2017. This demonstrated slightly increased echogenicity. A liver biopsy was performed in 3/2017.  This demonstrated severe inflammation and portal fibrosis. The initial AST/ALT were in the 700-800 range. These are now normal. The initial ALP was 350. This is now normal.     Tests of hepatic synthetic and metabolic function are normal, and the platelet count is normal.    The patient notes swelling of the lower extremities. The patient has not experienced pain in the right side over the liver, yellowing of the eyes or skin, problems concentrating. The patient has moderate limitations in functional activities which can be attributed to other medical problems that are not related to the liver disease. ALLERGIES  No Known Allergies    MEDICATIONS  Current Outpatient Medications   Medication Sig    pravastatin (PRAVACHOL) 80 mg tablet Take 80 mg by mouth.  tacrolimus (PROGRAF) 1 mg capsule Take 1 Cap by mouth every twelve (12) hours.  aspirin delayed-release 81 mg tablet Take  by mouth daily.  furosemide (LASIX) 40 mg tablet TAKE 1 TABLET EVERY DAY    spironolactone (ALDACTONE) 100 mg tablet TAKE 1 TABLET EVERY DAY    amLODIPine (NORVASC) 2.5 mg tablet     LANTUS SOLOSTAR 100 unit/mL (3 mL) inpn 26 Units daily. No current facility-administered medications for this visit. SYSTEM REVIEW NOT RELATED TO LIVER DISEASE OR REVIEWED ABOVE:  Constitution systems: morbid obesity  Eyes: Negative for visual changes. ENT: Negative for sore throat, painful swallowing. Respiratory: Negative for cough, hemoptysis, SOB. Cardiology: Negative for chest pain, palpitations. GI:  Negative for constipation or diarrhea. : Negative for urinary frequency, dysuria, hematuria, nocturia. Skin: Negative for rash. Hematology: Negative for easy bruising, blood clots. Musculo-skelatal: Arthritis of knees. Neurologic: Negative for headaches, dizziness, vertigo, memory problems not related to HE. Psychology: Negative for anxiety, depression. FAMILY HISTORY:  The father  of DM. The mother  of Sarcoidosis.     There is no family history of liver disease. There is no family history of immune disorders. SOCIAL HISTORY:  The patient is . The patient has 2 children, and 7 grandchildren. The patient has never used tobacco products. The patient consumes alcohol on social occasions never in excess. The patient has been abstinent from alcohol since 3/2017. The patient used to work in [employer] for children. The patient has not worked since 7/2015. PHYSICAL EXAMINATION:  Visit Vitals  /74 (BP 1 Location: Right arm, BP Patient Position: Sitting) Comment: MANUAL   Pulse 74   Temp 99.3 °F (37.4 °C) (Tympanic)   Resp 18   Ht 5' 3\" (1.6 m)   Wt 284 lb (128.8 kg)   SpO2 98%   BMI 50.31 kg/m²     General: No acute distress. Eyes: Sclera anicteric. ENT: No oral lesions. Thyroid normal.  Nodes: No adenopathy. Skin: No spider angiomata. No jaundice. No palmar erythema. Respiratory: Lungs clear to auscultation. Cardiovascular: Regular heart rate. No murmurs. No JVD. Abdomen: Unable to perform due to habitus  Extremities: No edema. No muscle wasting. No gross arthritic changes. Neurologic: Alert and oriented. Cranial nerves grossly intact. No asterixsis.     LABORATORY STUDIES:  [facility] [place] of [address] [place] [address] 7/26/2018 4/26/2018   WBC 3.4 - 10.8 x10E3/uL 7.7 6.7   ANC 1.4 - 7.0 x10E3/uL 4.3 2.7   HGB 11.1 - 15.9 g/dL 12.8 12.4    - 379 x10E3/uL 274 272   AST 0 - 40 IU/L 12 12   ALT 0 - 32 IU/L 9 6   Alk Phos 39 - 117 IU/L 113 100   Bili, Total 0.0 - 1.2 mg/dL 0.5 0.5   Bili, Direct 0.00 - 0.40 mg/dL 0.13 0.14   Albumin 3.6 - 4.8 g/dL 4.0 3.8   BUN 8 - 27 mg/dL 11 9   Creat 0.57 - 1.00 mg/dL 0.60 0.60   Na 134 - 144 mmol/L 143 141   K 3.5 - 5.2 mmol/L 4.5 3.8   Cl 96 - 106 mmol/L 104 101   CO2 20 - 29 mmol/L 23 24   Glucose 65 - 99 mg/dL 107 (H) 122 (H)     TACROLIMUS LEVEL:  Liver Virology and Transplant Immune Suppression Latest Ref Rng & Units 7/26/2018   Tacrolimus Level 2.0 - 20.0 ng/mL None Detected     Liver Virology and Transplant Immune Suppression Latest Ref Rng & Units 4/26/2018   Tacrolimus Level 2.0 - 20.0 ng/mL 2.2     Liver Virology and Transplant Immune Suppression Latest Ref Rng & Units 3/19/2018   Tacrolimus Level 2.0 - 20.0 ng/mL 2.4     SEROLOGIES:  3/2017. HBsAntigen negative, anti-HCV negative, Ferritin 239, iron saturation 11%, KAYLEE negative, ASMA positive 1:320,     LIVER HISTOLOGY:  3/2017. Severe active hepatitis with PMN and portal fibrosis. Biopsy slides not reviewed by MLS.  5/2018. Sheer wave elastography performed at [facility]. EkPa was E Range: 4.24-9.41 kPa, E Mean: 6.39 kPa, E Median: 6.10 kPa, E Std: 1.54 kPa. The results suggested a fibrosis level of F1.    ENDOSCOPIC PROCEDURES:  Not available or performed    RADIOLOGY:  3/2017. Ultrasound of liver. Mild increased echogenicity. No liver mass lesions. No dilated bile ducts. No ascites. 5/2018. Ultrasound of the liver. Mild increased echogenicity. No liver mass lesions. No dilated bile ducts. No ascites. 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  Alkaline phosphate is normal.  Liver function is normal.  The platelet count is normal.      The patient is receiving treatment with Tacrolimus 1 mg twice daily. Patient reports she is only taking Tacrolimus 1 mg daily. The patient was directed to continue all other medications at the current dosages. There are no contraindications for the patient to take any medications that are necessary for treatment of other medical issues. The need to assess liver fibrosis was discussed. Sheer wave elastography can assess liver fibrosis and determine if a patient has advanced fibrosis or cirrhosis without the need for liver biopsy. Sheer wave elastography is now available at [employer].       Elastography can be repeated annually to demonstrate that the treatment is resolving hepatic fibrosis. The patient was counseled regarding alcohol consumption. Vaccination for viral hepatitis A and B is recommended since the patient has no serologic evidence of previous exposure or vaccination with immunity. Will perform laboratory testing to monitor liver function and degree of liver injury. This will include BMP, hepatic panel, CBC with platelet count. Will perform laboratory testing to monitor immune suppression medication. This will include tacrolimus level. All of the above issues were discussed with the patient. All questions were answered. The patient expressed a clear understanding of the above. [address] [address] in 6 months for routine monitoring.        Amirah Dorado, AGPCNP-BC  [address] [facility] [place] [facility] [place]  4 [facility], [address] [place] [address] Kasandra Mnaley, [address]  [phone]

## 2018-11-19 NOTE — PROGRESS NOTES
Elizabeth Cordero is a 70 y.o. female      1. Have you been to the ER, urgent care clinic or hospitalized since your last visit? NO.     2. Have you seen or consulted any other health care providers outside of the [address] [place] [address] since your last visit (Include any pap smears or colon screening)?  NO          Learning Assessment 2/15/2018   PRIMARY LEARNER Patient   HIGHEST LEVEL OF EDUCATION - PRIMARY LEARNER  GRADUATED HIGH SCHOOL OR GED   BARRIERS PRIMARY LEARNER NONE   CO-LEARNER CAREGIVER No   PRIMARY LANGUAGE ENGLISH   LEARNER PREFERENCE PRIMARY DEMONSTRATION   ANSWERED BY patient   RELATIONSHIP SELF

## 2019-03-08 ENCOUNTER — HOSPITAL ENCOUNTER (EMERGENCY)
Age: 64
Discharge: HOME | End: 2019-03-08
Attending: EMERGENCY MEDICINE
Payer: MEDICARE

## 2019-03-08 ENCOUNTER — APPOINTMENT (OUTPATIENT)
Dept: GENERAL RADIOLOGY | Age: 64
End: 2019-03-08
Attending: EMERGENCY MEDICINE
Payer: MEDICARE

## 2019-03-08 VITALS
RESPIRATION RATE: 18 BRPM | OXYGEN SATURATION: 100 % | DIASTOLIC BLOOD PRESSURE: 65 MMHG | HEIGHT: 68 IN | WEIGHT: 285 LBS | TEMPERATURE: 98.5 F | HEART RATE: 75 BPM | SYSTOLIC BLOOD PRESSURE: 184 MMHG | BODY MASS INDEX: 43.19 KG/M2

## 2019-03-08 DIAGNOSIS — R07.89 MUSCULOSKELETAL CHEST PAIN: ICD-10-CM

## 2019-03-08 DIAGNOSIS — I10 ESSENTIAL HYPERTENSION: Primary | ICD-10-CM

## 2019-03-08 LAB
ALBUMIN SERPL-MCNC: 3.5 G/DL (ref 3.4–5)
ALBUMIN/GLOB SERPL: 0.8 {RATIO} (ref 0.8–1.7)
ALP SERPL-CCNC: 113 U/L (ref 45–117)
ALT SERPL-CCNC: 12 U/L (ref 13–56)
ANION GAP SERPL CALC-SCNC: 8 MMOL/L (ref 3–18)
APPEARANCE UR: ABNORMAL
AST SERPL-CCNC: 10 U/L (ref 15–37)
BACTERIA URNS QL MICRO: ABNORMAL /HPF
BASOPHILS # BLD: 0 K/UL (ref 0–0.1)
BASOPHILS NFR BLD: 0 % (ref 0–2)
BILIRUB SERPL-MCNC: 0.3 MG/DL (ref 0.2–1)
BILIRUB UR QL: NEGATIVE
BUN SERPL-MCNC: 12 MG/DL (ref 7–18)
BUN/CREAT SERPL: 19 (ref 12–20)
CALCIUM SERPL-MCNC: 8.6 MG/DL (ref 8.5–10.1)
CHLORIDE SERPL-SCNC: 108 MMOL/L (ref 100–108)
CK MB CFR SERPL CALC: NORMAL % (ref 0–4)
CK MB SERPL-MCNC: <1 NG/ML (ref 5–25)
CK SERPL-CCNC: 43 U/L (ref 26–192)
CO2 SERPL-SCNC: 26 MMOL/L (ref 21–32)
COLOR UR: YELLOW
CREAT SERPL-MCNC: 0.63 MG/DL (ref 0.6–1.3)
DIFFERENTIAL METHOD BLD: ABNORMAL
EOSINOPHIL # BLD: 0.2 K/UL (ref 0–0.4)
EOSINOPHIL NFR BLD: 3 % (ref 0–5)
EPITH CASTS URNS QL MICRO: ABNORMAL /LPF (ref 0–5)
ERYTHROCYTE [DISTWIDTH] IN BLOOD BY AUTOMATED COUNT: 15.7 % (ref 11.6–14.5)
GLOBULIN SER CALC-MCNC: 4.2 G/DL (ref 2–4)
GLUCOSE SERPL-MCNC: 95 MG/DL (ref 74–99)
GLUCOSE UR STRIP.AUTO-MCNC: NEGATIVE MG/DL
HCT VFR BLD AUTO: 38.2 % (ref 35–45)
HGB BLD-MCNC: 12 G/DL (ref 12–16)
HGB UR QL STRIP: NEGATIVE
KETONES UR QL STRIP.AUTO: NEGATIVE MG/DL
LEUKOCYTE ESTERASE UR QL STRIP.AUTO: ABNORMAL
LIPASE SERPL-CCNC: 136 U/L (ref 73–393)
LYMPHOCYTES # BLD: 2.9 K/UL (ref 0.9–3.6)
LYMPHOCYTES NFR BLD: 45 % (ref 21–52)
MAGNESIUM SERPL-MCNC: 2.2 MG/DL (ref 1.6–2.6)
MCH RBC QN AUTO: 27.6 PG (ref 24–34)
MCHC RBC AUTO-ENTMCNC: 31.4 G/DL (ref 31–37)
MCV RBC AUTO: 87.8 FL (ref 74–97)
MONOCYTES # BLD: 0.6 K/UL (ref 0.05–1.2)
MONOCYTES NFR BLD: 9 % (ref 3–10)
NEUTS SEG # BLD: 2.8 K/UL (ref 1.8–8)
NEUTS SEG NFR BLD: 43 % (ref 40–73)
NITRITE UR QL STRIP.AUTO: NEGATIVE
PH UR STRIP: 6.5 [PH] (ref 5–8)
PLATELET # BLD AUTO: 221 K/UL (ref 135–420)
PMV BLD AUTO: 8.9 FL (ref 9.2–11.8)
POTASSIUM SERPL-SCNC: 3.8 MMOL/L (ref 3.5–5.5)
PROT SERPL-MCNC: 7.7 G/DL (ref 6.4–8.2)
PROT UR STRIP-MCNC: NEGATIVE MG/DL
RBC # BLD AUTO: 4.35 M/UL (ref 4.2–5.3)
RBC #/AREA URNS HPF: NEGATIVE /HPF (ref 0–5)
SODIUM SERPL-SCNC: 142 MMOL/L (ref 136–145)
SP GR UR REFRACTOMETRY: 1.02 (ref 1–1.03)
TROPONIN I SERPL-MCNC: <0.02 NG/ML (ref 0–0.04)
UROBILINOGEN UR QL STRIP.AUTO: 4 EU/DL (ref 0.2–1)
WBC # BLD AUTO: 6.5 K/UL (ref 4.6–13.2)
WBC URNS QL MICRO: ABNORMAL /HPF (ref 0–5)

## 2019-03-08 PROCEDURE — 83735 ASSAY OF MAGNESIUM: CPT

## 2019-03-08 PROCEDURE — 83690 ASSAY OF LIPASE: CPT

## 2019-03-08 PROCEDURE — 82550 ASSAY OF CK (CPK): CPT

## 2019-03-08 PROCEDURE — 99284 EMERGENCY DEPT VISIT MOD MDM: CPT

## 2019-03-08 PROCEDURE — 81001 URINALYSIS AUTO W/SCOPE: CPT

## 2019-03-08 PROCEDURE — 71045 X-RAY EXAM CHEST 1 VIEW: CPT

## 2019-03-08 PROCEDURE — 93005 ELECTROCARDIOGRAM TRACING: CPT

## 2019-03-08 PROCEDURE — 85025 COMPLETE CBC W/AUTO DIFF WBC: CPT

## 2019-03-08 PROCEDURE — 80053 COMPREHEN METABOLIC PANEL: CPT

## 2019-03-08 RX ORDER — LISINOPRIL 10 MG/1
10 TABLET ORAL
COMMUNITY
Start: 2018-12-17 | End: 2019-12-17

## 2019-03-08 RX ORDER — ACETAMINOPHEN 325 MG/1
1000 TABLET ORAL
Qty: 30 TAB | Refills: 0 | Status: SHIPPED | OUTPATIENT
Start: 2019-03-08 | End: 2019-05-20

## 2019-03-08 NOTE — ED PROVIDER NOTES
EMERGENCY DEPARTMENT HISTORY AND PHYSICAL EXAM    Date: 3/8/2019  Patient Name: Marcelo Nevarez    History of Presenting Illness     Chief Complaint   Patient presents with    Chest Pain         History Provided By: Patient    Chief Complaint: Neck pain  Duration: 11 Hours  Timing:  Acute and Worsening  Location: Left neck radiating into left arm and chest  Severity: 9 out of 10  Modifying Factors: Slight alleviation with 2 Aspirin  Associated Symptoms: intermittent HA    Additional History (Context):   4:42 PM  Marcelo Nevarez is a 70 y.o. female with PMHX of diabetes and HTN who presents to the emergency department C/O worsening left neck pain (9/10) radiating down left arm and into her left chest onset 11 hours ago. Associated sxs include intermittent HA. Slight alleviation with 2 Aspirin 81 mg. Pt denies fever, chills, nausea, vomiting, Hx of cardiac disease, known injury/trauma, and any other sxs or complaints. PCP: Maliha Jeronimo MD    Current Outpatient Medications   Medication Sig Dispense Refill    lisinopril (PRINIVIL, ZESTRIL) 10 mg tablet Take 10 mg by mouth.  acetaminophen (TYLENOL) 325 mg tablet Take 3 Tabs by mouth every six (6) hours as needed for Pain. 30 Tab 0    pravastatin (PRAVACHOL) 80 mg tablet Take 80 mg by mouth.  tacrolimus (PROGRAF) 1 mg capsule Take 1 Cap by mouth every twelve (12) hours. 180 Cap 3    aspirin delayed-release 81 mg tablet Take  by mouth daily.  furosemide (LASIX) 40 mg tablet TAKE 1 TABLET EVERY DAY 90 Tab 3    spironolactone (ALDACTONE) 100 mg tablet TAKE 1 TABLET EVERY DAY 90 Tab 3    amLODIPine (NORVASC) 2.5 mg tablet       LANTUS SOLOSTAR 100 unit/mL (3 mL) inpn 26 Units daily. Past History     Past Medical History:  Past Medical History:   Diagnosis Date    Diabetes ([address]     Hypertension     Liver disease        Past Surgical History:  History reviewed. No pertinent surgical history. Family History:  History reviewed.  No pertinent family history. Social History:  Social History     Tobacco Use    Smoking status: Never Smoker    Smokeless tobacco: Never Used   Substance Use Topics    Alcohol use: Yes     Comment: 2 Drinks every 2 months    Drug use: No       Allergies:  No Known Allergies      Review of Systems   Review of Systems   Constitutional: Negative for chills and fever. Cardiovascular: Positive for chest pain (left). Gastrointestinal: Negative for nausea and vomiting. Musculoskeletal: Positive for arthralgias (left arm) and neck pain (left). Neurological: Positive for headaches. All other systems reviewed and are negative. Physical Exam     Vitals:    03/08/19 1600 03/08/19 1700 03/08/19 1730 03/08/19 1800   BP: 180/66 183/63 183/69 184/63   Pulse: 70 74 78 76   Resp: 19 25 18 18   Temp:       SpO2: 100% 100% 100% 100%   Weight:       Height:         Physical Exam   Nursing note and vitals reviewed. CONSTITUTIONAL: Alert, in no apparent distress; well-developed; well-nourished. HEAD:  Normocephalic, atraumatic  EYES: PERRL; EOM's intact. ENTM: Nose: no rhinorrhea; Throat: no erythema or exudate, mucous membranes moist  Neck:  No JVD, supple without lymphadenopathy  RESP: Chest clear, equal breath sounds. CV: S1 and S2 WNL; No murmurs, gallops or rubs. CHEST: Reproducible anterior chest wall tenderness  GI: Normal bowel sounds, abdomen soft and non-tender. No masses or organomegaly. : No costo-vertebral angle tenderness. BACK:  Non-tender  UPPER EXT:  Normal inspection  LOWER EXT: No edema, no calf tenderness. Distal pulses intact. NEURO: CN intact, reflexes 2/4 and sym, strength 5/5 and sym, sensation intact. SKIN: normal for age and stage. PSYCH:  Alert and oriented, normal affect.      Diagnostic Study Results     Labs -     Recent Results (from the past 12 hour(s))   EKG, 12 LEAD, INITIAL    Collection Time: 03/08/19  1:01 PM   Result Value Ref Range    Ventricular Rate 69 BPM    Atrial Rate 69 BPM    P-R Interval 148 ms    QRS Duration 92 ms    Q-T Interval 422 ms    QTC Calculation (Bezet) 452 ms    Calculated P Axis 36 degrees    Calculated R Axis 10 degrees    Calculated T Axis 38 degrees    Diagnosis       Normal sinus rhythm  Normal ECG  No previous ECGs available     URINALYSIS W/ RFLX MICROSCOPIC    Collection Time: 03/08/19  4:50 PM   Result Value Ref Range    Color YELLOW      Appearance CLOUDY      Specific gravity 1.021 1.005 - 1.030      pH (UA) 6.5 5.0 - 8.0      Protein NEGATIVE  NEG mg/dL    Glucose NEGATIVE  NEG mg/dL    Ketone NEGATIVE  NEG mg/dL    Bilirubin NEGATIVE  NEG      Blood NEGATIVE  NEG      Urobilinogen 4.0 (H) 0.2 - 1.0 EU/dL    Nitrites NEGATIVE  NEG      Leukocyte Esterase MODERATE (A) NEG     URINE MICROSCOPIC ONLY    Collection Time: 03/08/19  4:50 PM   Result Value Ref Range    WBC 10 to 15 0 - 5 /hpf    RBC NEGATIVE  0 - 5 /hpf    Epithelial cells 2+ 0 - 5 /lpf    Bacteria 1+ (A) NEG /hpf   LIPASE    Collection Time: 03/08/19  5:25 PM   Result Value Ref Range    Lipase 136 73 - 393 U/L   MAGNESIUM    Collection Time: 03/08/19  5:25 PM   Result Value Ref Range    Magnesium 2.2 1.6 - 2.6 mg/dL   METABOLIC PANEL, COMPREHENSIVE    Collection Time: 03/08/19  5:25 PM   Result Value Ref Range    Sodium 142 136 - 145 mmol/L    Potassium 3.8 3.5 - 5.5 mmol/L    Chloride 108 100 - 108 mmol/L    CO2 26 21 - 32 mmol/L    Anion gap 8 3.0 - 18 mmol/L    Glucose 95 74 - 99 mg/dL    BUN 12 7.0 - 18 MG/DL    Creatinine 0.63 0.6 - 1.3 MG/DL    BUN/Creatinine ratio 19 12 - 20      GFR est AA >60 >60 ml/min/1.73m2    GFR est non-AA >60 >60 ml/min/1.73m2    Calcium 8.6 8.5 - 10.1 MG/DL    Bilirubin, total 0.3 0.2 - 1.0 MG/DL    ALT (SGPT) 12 (L) 13 - 56 U/L    AST (SGOT) 10 (L) 15 - 37 U/L    Alk.  phosphatase 113 45 - 117 U/L    Protein, total 7.7 6.4 - 8.2 g/dL    Albumin 3.5 3.4 - 5.0 g/dL    Globulin 4.2 (H) 2.0 - 4.0 g/dL    A-G Ratio 0.8 0.8 - 1.7     CARDIAC PANEL,(CK, CKMB & TROPONIN)    Collection Time: 03/08/19  5:25 PM   Result Value Ref Range    CK 43 26 - 192 U/L    CK - MB <1.0 <3.6 ng/ml    CK-MB Index  0.0 - 4.0 %     CALCULATION NOT PERFORMED WHEN RESULT IS BELOW LINEAR LIMIT    Troponin-I, QT <0.02 0.0 - 0.045 NG/ML   CBC WITH AUTOMATED DIFF    Collection Time: 03/08/19  5:25 PM   Result Value Ref Range    WBC 6.5 4.6 - 13.2 K/uL    RBC 4.35 4.20 - 5.30 M/uL    HGB 12.0 12.0 - 16.0 g/dL    HCT 38.2 35.0 - 45.0 %    MCV 87.8 74.0 - 97.0 FL    MCH 27.6 24.0 - 34.0 PG    MCHC 31.4 31.0 - 37.0 g/dL    RDW 15.7 (H) 11.6 - 14.5 %    PLATELET 849 532 - 283 K/uL    MPV 8.9 (L) 9.2 - 11.8 FL    NEUTROPHILS 43 40 - 73 %    LYMPHOCYTES 45 21 - 52 %    MONOCYTES 9 3 - 10 %    EOSINOPHILS 3 0 - 5 %    BASOPHILS 0 0 - 2 %    ABS. NEUTROPHILS 2.8 1.8 - 8.0 K/UL    ABS. LYMPHOCYTES 2.9 0.9 - 3.6 K/UL    ABS. MONOCYTES 0.6 0.05 - 1.2 K/UL    ABS. EOSINOPHILS 0.2 0.0 - 0.4 K/UL    ABS. BASOPHILS 0.0 0.0 - 0.1 K/UL    DF AUTOMATED         Radiologic Studies -   XR CHEST PORT    (Results Pending)     6:19 PM  RADIOLOGY FINDINGS  Chest X-ray shows NACPD  Pending review by Radiologist  Recorded by Malachy Baumgarten, ED Scribe, as dictated by Chanda Patel MD.    CT Results  (Last 48 hours)    None        CXR Results  (Last 48 hours)    None          Medications given in the ED-  Medications - No data to display      Medical Decision Making   I am the first provider for this patient. I reviewed the vital signs, available nursing notes, past medical history, past surgical history, family history and social history. Vital Signs-Reviewed the patient's vital signs. Pulse Oximetry Analysis - 100% on RA     Cardiac Monitor:  Rate: 84 bpm  Rhythm: NSR    EKG interpretation: (Preliminary)  Rhythm: NSR.  Rate: 69 bpm; No STEMI  EKG read by Chanda Patel MD at 1:01 PM     Records Reviewed: Nursing Notes and Old Medical Records    Provider Notes (Medical Decision Making): musculoskeletal chest pain, unlikely ACS    HEART Score    History  0: Slightly suspicious  EKG  0: Normal  Age  1: 45-65  Risk Factors  2: 3+, Prior CAD, PVD, CVA  Risk Factors:  Hypercholesterolemia, Hypertension, Diabetes Mellitus, Obesity  Troponin  0: Negative     Total: 3  0-3: Low risk: less than 2% risk of Major Adverse Cardiac Event at 6 weeks. 4-6: Intermediate risk: 12-16.6% risk of Major Adverse Cardiac Event at 6 weeks, requires further management or admission  7-10: High risk: 50-65% risk of Major Adverse Cardiac Event at 6 weeks, requires further management or admission. Procedures:  Procedures    ED Course:   4:42 PM Initial assessment performed. The patients presenting problems have been discussed, and they are in agreement with the care plan formulated and outlined with them. I have encouraged them to ask questions as they arise throughout their visit. 6:37 PM Patient is hypertensive but takes medications at home. Her chest pain is completley reproducible. Cardiac panel is normal. Will discharge. Diagnosis and Disposition     Discussion;  70 y.o male with chest pain that is completely and exactly reproducible. Negative cardiac work up despite hypertension. Most likely musculoskeletal  pain. Patient does not have any known injury but pain is most consistent with MSK. Patient cannot take Ibuprofen due to other medications. Recommended Tylenol but she states this upsets her stomach. Told patient to take full dose Aspirin for pain. DISCHARGE NOTE:  6:38 PM  Jeanie Ector  results have been reviewed with her. She has been counseled regarding her diagnosis, treatment, and plan. She verbally conveys understanding and agreement of the signs, symptoms, diagnosis, treatment and prognosis and additionally agrees to follow up as discussed. She also agrees with the care-plan and conveys that all of her questions have been answered.   I have also provided discharge instructions for her that include: educational information regarding their diagnosis and treatment, and list of reasons why they would want to return to the ED prior to their follow-up appointment, should her condition change. She has been provided with education for proper emergency department utilization. CLINICAL IMPRESSION:    1. Essential hypertension    2. Musculoskeletal chest pain        PLAN:  1. D/C Home  2. Current Discharge Medication List      START taking these medications    Details   acetaminophen (TYLENOL) 325 mg tablet Take 3 Tabs by mouth every six (6) hours as needed for Pain. Qty: 30 Tab, Refills: 0         CONTINUE these medications which have NOT CHANGED    Details   lisinopril (PRINIVIL, ZESTRIL) 10 mg tablet Take 10 mg by mouth.      pravastatin (PRAVACHOL) 80 mg tablet Take 80 mg by mouth. tacrolimus (PROGRAF) 1 mg capsule Take 1 Cap by mouth every twelve (12) hours. Qty: 180 Cap, Refills: 3      aspirin delayed-release 81 mg tablet Take  by mouth daily. furosemide (LASIX) 40 mg tablet TAKE 1 TABLET EVERY DAY  Qty: 90 Tab, Refills: 3      spironolactone (ALDACTONE) 100 mg tablet TAKE 1 TABLET EVERY DAY  Qty: 90 Tab, Refills: 3      amLODIPine (NORVASC) 2.5 mg tablet       LANTUS SOLOSTAR 100 unit/mL (3 mL) inpn 26 Units daily. 3.   Follow-up Information     Follow up With Specialties Details Why Contact Info    Jackalyn Riedel, MD Family Practice Schedule an appointment as soon as possible for a visit in 3 days For primary care follow up. [address] [facility]      [facility] EMERGENCY DEPT Emergency Medicine Go to As needed, If symptoms worsen [address] [address]  [phone]        _______________________________    Attestations: This note is prepared by Kavita Reynoso, acting as Scribe for Marlene Real MD.    Marlene Rela MD:  The scribe's documentation has been prepared under my direction and personally reviewed by me in its entirety. I confirm that the note above accurately reflects all work, treatment, procedures, and medical decision making performed by me.  _______________________________

## 2019-03-08 NOTE — ED TRIAGE NOTES
Patient with complaints of left neck pain that radiates down the left arm and pain to the center of chest that radiates to the back.  Patient states the pain started at 0600

## 2019-03-08 NOTE — DISCHARGE INSTRUCTIONS
Chest Pain: Care Instructions  Your Care Instructions    There are many things that can cause chest pain. Some are not serious and will get better on their own in a few days. But some kinds of chest pain need more testing and treatment. Your doctor may have recommended a follow-up visit in the next 8 to 12 hours. If you are not getting better, you may need more tests or treatment. Even though your doctor has released you, you still need to watch for any problems. The doctor carefully checked you, but sometimes problems can develop later. If you have new symptoms or if your symptoms do not get better, get medical care right away. If you have worse or different chest pain or pressure that lasts more than 5 minutes or you passed out (lost consciousness), call 911 or seek other emergency help right away. A medical visit is only one step in your treatment. Even if you feel better, you still need to do what your doctor recommends, such as going to all suggested follow-up appointments and taking medicines exactly as directed. This will help you recover and help prevent future problems. How can you care for yourself at home? · Rest until you feel better. · Take your medicine exactly as prescribed. Call your doctor if you think you are having a problem with your medicine. · Do not drive after taking a prescription pain medicine. When should you call for help? Call 911 if:    · You passed out (lost consciousness).     · You have severe difficulty breathing.     · You have symptoms of a heart attack. These may include:  ? Chest pain or pressure, or a strange feeling in your chest.  ? Sweating. ? Shortness of breath. ? Nausea or vomiting. ? Pain, pressure, or a strange feeling in your back, neck, jaw, or upper belly or in one or both shoulders or arms. ? Lightheadedness or sudden weakness. ? A fast or irregular heartbeat.   After you call 911, the  may tell you to chew 1 adult-strength or 2 to 4 low-dose aspirin. Wait for an ambulance. Do not try to drive yourself.    Call your doctor today if:    · You have any trouble breathing.     · Your chest pain gets worse.     · You are dizzy or lightheaded, or you feel like you may faint.     · You are not getting better as expected.     · You are having new or different chest pain. Where can you learn more? Go to http://gautam-edilberto.info/. Enter A120 in the search box to learn more about \"Chest Pain: Care Instructions. \"  Current as of: September 23, 2018  Content Version: 11.9  © 0016-2756 [employer]. Care instructions adapted under license by [employer] (which disclaims liability or warranty for this information). If you have questions about a medical condition or this instruction, always ask your healthcare professional. [address] any warranty or liability for your use of this information. High Blood Pressure: Care Instructions  Overview    It's normal for blood pressure to go up and down throughout the day. But if it stays up, you have high blood pressure. Another name for high blood pressure is hypertension. Despite what a lot of people think, high blood pressure usually doesn't cause headaches or make you feel dizzy or lightheaded. It usually has no symptoms. But it does increase your risk of stroke, heart attack, and other problems. You and your doctor will talk about your risks of these problems based on your blood pressure. Your doctor will give you a goal for your blood pressure. Your goal will be based on your health and your age. Lifestyle changes, such as eating healthy and being active, are always important to help lower blood pressure. You might also take medicine to reach your blood pressure goal.  Follow-up care is a key part of your treatment and safety. Be sure to make and go to all appointments, and call your doctor if you are having problems.  It's also a good idea to know your test results and keep a list of the medicines you take. How can you care for yourself at home? Medical treatment  · If you stop taking your medicine, your blood pressure will go back up. You may take one or more types of medicine to lower your blood pressure. Be safe with medicines. Take your medicine exactly as prescribed. Call your doctor if you think you are having a problem with your medicine. · Talk to your doctor before you start taking aspirin every day. Aspirin can help certain people lower their risk of a heart attack or stroke. But taking aspirin isn't right for everyone, because it can cause serious bleeding. · See your doctor regularly. You may need to see the doctor more often at first or until your blood pressure comes down. · If you are taking blood pressure medicine, talk to your doctor before you take decongestants or anti-inflammatory medicine, such as ibuprofen. Some of these medicines can raise blood pressure. · Learn how to check your blood pressure at home. Lifestyle changes  · Stay at a healthy weight. This is especially important if you put on weight around the waist. Losing even 10 pounds can help you lower your blood pressure. · If your doctor recommends it, get more exercise. Walking is a good choice. Bit by bit, increase the amount you walk every day. Try for at least 30 minutes on most days of the week. You also may want to swim, bike, or do other activities. · Avoid or limit alcohol. Talk to your doctor about whether you can drink any alcohol. · Try to limit how much sodium you eat to less than 2,300 milligrams (mg) a day. Your doctor may ask you to try to eat less than 1,500 mg a day. · Eat plenty of fruits (such as bananas and oranges), vegetables, legumes, whole grains, and low-fat dairy products. · Lower the amount of saturated fat in your diet. Saturated fat is found in animal products such as milk, cheese, and meat.  Limiting these foods may help you lose weight and also lower your risk for heart disease. · Do not smoke. Smoking increases your risk for heart attack and stroke. If you need help quitting, talk to your doctor about stop-smoking programs and medicines. These can increase your chances of quitting for good. When should you call for help? Call 911 anytime you think you may need emergency care. This may mean having symptoms that suggest that your blood pressure is causing a serious heart or blood vessel problem. Your blood pressure may be over 180/120.   For example, call 911 if:    · You have symptoms of a heart attack. These may include:  ? Chest pain or pressure, or a strange feeling in the chest.  ? Sweating. ? Shortness of breath. ? Nausea or vomiting. ? Pain, pressure, or a strange feeling in the back, neck, jaw, or upper belly or in one or both shoulders or arms. ? Lightheadedness or sudden weakness. ? A fast or irregular heartbeat.     · You have symptoms of a stroke. These may include:  ? Sudden numbness, tingling, weakness, or loss of movement in your face, arm, or leg, especially on only one side of your body. ? Sudden vision changes. ? Sudden trouble speaking. ? Sudden confusion or trouble understanding simple statements. ? Sudden problems with walking or balance. ? A sudden, severe headache that is different from past headaches.     · You have severe back or belly pain.    Do not wait until your blood pressure comes down on its own. Get help right away.   Call your doctor now or seek immediate care if:    · Your blood pressure is much higher than normal (such as 180/120 or higher), but you don't have symptoms.     · You think high blood pressure is causing symptoms, such as:  ? Severe headache.  ? Blurry vision.    Watch closely for changes in your health, and be sure to contact your doctor if:    · Your blood pressure measures higher than your doctor recommends at least 2 times.  That means the top number is higher or the bottom number is higher, or both.     · You think you may be having side effects from your blood pressure medicine. Where can you learn more? Go to http://gautam-edilberto.info/. Enter N139 in the search box to learn more about \"High Blood Pressure: Care Instructions. \"  Current as of: July 22, 2018  Content Version: 11.9  © 8543-6120 [employer]. Care instructions adapted under license by [employer] (which disclaims liability or warranty for this information). If you have questions about a medical condition or this instruction, always ask your healthcare professional. [place] [address] any warranty or liability for your use of this information.

## 2019-04-21 LAB
ATRIAL RATE: 69 BPM
CALCULATED P AXIS, ECG09: 36 DEGREES
CALCULATED R AXIS, ECG10: 10 DEGREES
CALCULATED T AXIS, ECG11: 38 DEGREES
DIAGNOSIS, 93000: NORMAL
P-R INTERVAL, ECG05: 148 MS
Q-T INTERVAL, ECG07: 422 MS
QRS DURATION, ECG06: 92 MS
QTC CALCULATION (BEZET), ECG08: 452 MS
VENTRICULAR RATE, ECG03: 69 BPM

## 2019-04-30 RX ORDER — FUROSEMIDE 40 MG/1
TABLET ORAL
Qty: 90 TAB | Refills: 3 | Status: SHIPPED | OUTPATIENT
Start: 2019-04-30 | End: 2020-05-14

## 2019-05-20 ENCOUNTER — HOSPITAL ENCOUNTER (OUTPATIENT)
Dept: LAB | Age: 64
Discharge: HOME | End: 2019-05-20
Payer: MEDICARE

## 2019-05-20 ENCOUNTER — OFFICE VISIT (OUTPATIENT)
Dept: HEMATOLOGY | Age: 64
End: 2019-05-20

## 2019-05-20 VITALS
TEMPERATURE: 98 F | SYSTOLIC BLOOD PRESSURE: 148 MMHG | RESPIRATION RATE: 18 BRPM | OXYGEN SATURATION: 98 % | BODY MASS INDEX: 51.91 KG/M2 | WEIGHT: 293 LBS | DIASTOLIC BLOOD PRESSURE: 66 MMHG | HEART RATE: 75 BPM | HEIGHT: 63 IN

## 2019-05-20 DIAGNOSIS — K75.4 AUTOIMMUNE HEPATITIS (HCC): Primary | ICD-10-CM

## 2019-05-20 DIAGNOSIS — K75.4 AUTOIMMUNE HEPATITIS (HCC): ICD-10-CM

## 2019-05-20 LAB
ALBUMIN SERPL-MCNC: 3.5 G/DL (ref 3.4–5)
ALBUMIN/GLOB SERPL: 0.8 {RATIO} (ref 0.8–1.7)
ALP SERPL-CCNC: 101 U/L (ref 45–117)
ALT SERPL-CCNC: 13 U/L (ref 13–56)
ANION GAP SERPL CALC-SCNC: 8 MMOL/L (ref 3–18)
AST SERPL-CCNC: 11 U/L (ref 15–37)
BASOPHILS # BLD: 0 K/UL (ref 0–0.1)
BASOPHILS NFR BLD: 0 % (ref 0–2)
BILIRUB DIRECT SERPL-MCNC: 0.1 MG/DL (ref 0–0.2)
BILIRUB SERPL-MCNC: 0.4 MG/DL (ref 0.2–1)
BUN SERPL-MCNC: 19 MG/DL (ref 7–18)
BUN/CREAT SERPL: 23 (ref 12–20)
CALCIUM SERPL-MCNC: 8.4 MG/DL (ref 8.5–10.1)
CHLORIDE SERPL-SCNC: 104 MMOL/L (ref 100–108)
CO2 SERPL-SCNC: 30 MMOL/L (ref 21–32)
CREAT SERPL-MCNC: 0.81 MG/DL (ref 0.6–1.3)
DIFFERENTIAL METHOD BLD: ABNORMAL
EOSINOPHIL # BLD: 0.2 K/UL (ref 0–0.4)
EOSINOPHIL NFR BLD: 3 % (ref 0–5)
ERYTHROCYTE [DISTWIDTH] IN BLOOD BY AUTOMATED COUNT: 15.4 % (ref 11.6–14.5)
GLOBULIN SER CALC-MCNC: 4.3 G/DL (ref 2–4)
GLUCOSE SERPL-MCNC: 101 MG/DL (ref 74–99)
HCT VFR BLD AUTO: 40.4 % (ref 35–45)
HGB BLD-MCNC: 12.7 G/DL (ref 12–16)
LYMPHOCYTES # BLD: 3 K/UL (ref 0.9–3.6)
LYMPHOCYTES NFR BLD: 40 % (ref 21–52)
MCH RBC QN AUTO: 28 PG (ref 24–34)
MCHC RBC AUTO-ENTMCNC: 31.4 G/DL (ref 31–37)
MCV RBC AUTO: 89.2 FL (ref 74–97)
MONOCYTES # BLD: 0.8 K/UL (ref 0.05–1.2)
MONOCYTES NFR BLD: 10 % (ref 3–10)
NEUTS SEG # BLD: 3.5 K/UL (ref 1.8–8)
NEUTS SEG NFR BLD: 47 % (ref 40–73)
PLATELET # BLD AUTO: 270 K/UL (ref 135–420)
PMV BLD AUTO: 9.9 FL (ref 9.2–11.8)
POTASSIUM SERPL-SCNC: 4.2 MMOL/L (ref 3.5–5.5)
PROT SERPL-MCNC: 7.8 G/DL (ref 6.4–8.2)
RBC # BLD AUTO: 4.53 M/UL (ref 4.2–5.3)
SODIUM SERPL-SCNC: 142 MMOL/L (ref 136–145)
WBC # BLD AUTO: 7.4 K/UL (ref 4.6–13.2)

## 2019-05-20 PROCEDURE — 80076 HEPATIC FUNCTION PANEL: CPT

## 2019-05-20 PROCEDURE — 80048 BASIC METABOLIC PNL TOTAL CA: CPT

## 2019-05-20 PROCEDURE — 80197 ASSAY OF TACROLIMUS: CPT

## 2019-05-20 PROCEDURE — 85025 COMPLETE CBC W/AUTO DIFF WBC: CPT

## 2019-05-20 PROCEDURE — 36415 COLL VENOUS BLD VENIPUNCTURE: CPT

## 2019-05-20 NOTE — PROGRESS NOTES
An Rodriguez is a 70 y.o. female      1. Have you been to the ER, urgent care clinic or hospitalized since your last visit? NO.     2. Have you seen or consulted any other health care providers outside of the [address] [place] [address] since your last visit (Include any pap smears or colon screening)?  NO          Learning Assessment 2/15/2018   PRIMARY LEARNER Patient   HIGHEST LEVEL OF EDUCATION - PRIMARY LEARNER  GRADUATED HIGH SCHOOL OR GED   BARRIERS PRIMARY LEARNER NONE   CO-LEARNER CAREGIVER No   PRIMARY LANGUAGE ENGLISH   LEARNER PREFERENCE PRIMARY DEMONSTRATION   ANSWERED BY patient   RELATIONSHIP SELF

## 2019-05-20 NOTE — PROGRESS NOTES
[address] [facility], MD, Vani, Pranav Salbarbara Hui, [place]       Dagoberto Love, KAY Chahal, EROS Santa, [facility]   Gigi Ewing, KAY [facility] 136    at [address], [address] [place] [address], [facility] 22.    [phone]    FAX: [address] [place] [address], [address], [address]    [phone]    FAX: [phone]     Patient Care Team:  Wing Boone MD as PCP - General (Family Practice)  Madison Tello MD (Gastroenterology)      Problem List  Date Reviewed: 11/21/2018          Codes Class Noted    Obesity, morbid [facility]) ICD-10-CM: E66.01  ICD-9-CM: 278.01  2/15/2018        Autoimmune hepatitis ([facility] 75.) ICD-10-CM: K75.4  ICD-9-CM: 571.42  5/30/2017        Diabetes mellitus, type 2 ([facility].) ICD-10-CM: E11.9  ICD-9-CM: 250.00  5/30/2017        Edema ICD-10-CM: R60.9  ICD-9-CM: 782.3  5/30/2017        Hypertension ICD-10-CM: I10  ICD-9-CM: 401.9  5/30/2017        Hypercholesterolemia ICD-10-CM: E78.00  ICD-9-CM: 272.0  5/30/2017              Yolande Anastasia returns to the [address] for management of autoimmune hepatitis. The active problem list, all pertinent past medical history, medications, and laboratory findings related to the liver disorder were reviewed with the patient. The patient is a 70 y.o. Black female who was first noted to have abnormalities in liver transaminases and alkaline phosphase in 3/2017. Further evaluation of these abnormalities has included serologic studies which were positive for ASMA    Ultrasound of the liver was performed in 3/2017. This demonstrated slightly increased echogenicity. A liver biopsy was performed in 3/2017.  This demonstrated severe inflammation and portal fibrosis. The initial AST/ALT were in the 700-800 range. These are now normal. The initial ALP was 350. This is now normal.     Tests of hepatic synthetic and metabolic function are normal, and the platelet count is normal.    The patient notes swelling of the lower extremities. The patient has not experienced pain in the right side over the liver, yellowing of the eyes or skin, problems concentrating. The patient has moderate limitations in functional activities which can be attributed to other medical problems that are not related to the liver disease. ALLERGIES  No Known Allergies    MEDICATIONS  Current Outpatient Medications   Medication Sig    furosemide (LASIX) 40 mg tablet TAKE 1 TABLET EVERY DAY    lisinopril (PRINIVIL, ZESTRIL) 10 mg tablet Take 10 mg by mouth.  pravastatin (PRAVACHOL) 80 mg tablet Take 80 mg by mouth.  tacrolimus (PROGRAF) 1 mg capsule Take 1 Cap by mouth every twelve (12) hours.  aspirin delayed-release 81 mg tablet Take  by mouth daily.  spironolactone (ALDACTONE) 100 mg tablet TAKE 1 TABLET EVERY DAY    amLODIPine (NORVASC) 2.5 mg tablet     LANTUS SOLOSTAR 100 unit/mL (3 mL) inpn 26 Units daily. No current facility-administered medications for this visit. SYSTEM REVIEW NOT RELATED TO LIVER DISEASE OR REVIEWED ABOVE:  Constitution systems: morbid obesity  Eyes: Negative for visual changes. ENT: Negative for sore throat, painful swallowing. Respiratory: Negative for cough, hemoptysis, SOB. Cardiology: Negative for chest pain, palpitations. GI:  Negative for constipation or diarrhea. : Negative for urinary frequency, dysuria, hematuria, nocturia. Skin: Negative for rash. Hematology: Negative for easy bruising, blood clots. Musculo-skelatal: Arthritis of knees. Neurologic: Negative for headaches, dizziness, vertigo, memory problems not related to HE. Psychology: Negative for anxiety, depression.      FAMILY HISTORY:  The father  of DM. The mother  of Sarcoidosis. There is no family history of liver disease. There is no family history of immune disorders. SOCIAL HISTORY:  The patient is . The patient has 2 children, and 7 grandchildren. The patient has never used tobacco products. The patient consumes alcohol on social occasions never in excess. The patient has been abstinent from alcohol since 3/2017. The patient used to work in [employer] for children. The patient has not worked since 2015. PHYSICAL EXAMINATION:  Visit Vitals  /66 (BP 1 Location: Left arm, BP Patient Position: Sitting) Comment: MANUAL   Pulse 75   Temp 98 °F (36.7 °C) (Tympanic)   Resp 18   Ht 5' 3\" (1.6 m)   Wt 295 lb (133.8 kg)   SpO2 98%   BMI 52.26 kg/m²     General: No acute distress. Eyes: Sclera anicteric. ENT: No oral lesions. Thyroid normal.  Nodes: No adenopathy. Skin: No spider angiomata. No jaundice. No palmar erythema. Respiratory: Lungs clear to auscultation. Cardiovascular: Regular heart rate. No murmurs. No JVD. Abdomen: Unable to perform due to habitus  Extremities: No edema. No muscle wasting. No gross arthritic changes. Neurologic: Alert and oriented. Cranial nerves grossly intact. No asterixsis.     LABORATORY STUDIES:  Liver [place] of [address] Units 3/8/2019 2018   WBC 4.6 - 13.2 K/uL 6.5 7.7   ANC 1.8 - 8.0 K/UL 2.8 4.3   HGB 12.0 - 16.0 g/dL 12.0 12.8    - 420 K/uL 221 274   AST 15 - 37 U/L 10 (L) 12   ALT 13 - 56 U/L 12 (L) 9   Alk Phos 45 - 117 U/L 113 113   Bili, Total 0.2 - 1.0 MG/DL 0.3 0.5   Bili, Direct 0.00 - 0.40 mg/dL  0.13   Albumin 3.4 - 5.0 g/dL 3.5 4.0   BUN 7.0 - 18 MG/DL 12 11   Creat 0.6 - 1.3 MG/DL 0.63 0.60   Na 136 - 145 mmol/L 142 143   K 3.5 - 5.5 mmol/L 3.8 4.5   Cl 100 - 108 mmol/L 108 104   CO2 21 - 32 mmol/L 26 23   Glucose 74 - 99 mg/dL 95 107 (H)   Magnesium 1.6 - 2.6 mg/dL 2.2      TACROLIMUS LEVEL:  Liver Virology and Transplant Immune Suppression Latest Ref Rng & Units 7/26/2018   Tacrolimus Level 2.0 - 20.0 ng/mL None Detected     Liver Virology and Transplant Immune Suppression Latest Ref Rng & Units 4/26/2018   Tacrolimus Level 2.0 - 20.0 ng/mL 2.2     Liver Virology and Transplant Immune Suppression Latest Ref Rng & Units 3/19/2018   Tacrolimus Level 2.0 - 20.0 ng/mL 2.4     SEROLOGIES:  3/2017. HBsAntigen negative, anti-HCV negative, Ferritin 239, iron saturation 11%, KAYLEE negative, ASMA positive 1:320,     LIVER HISTOLOGY:  3/2017. Severe active hepatitis with PMN and portal fibrosis. Biopsy slides not reviewed by MLS.  5/2018. Sheer wave elastography performed at THE [facility]. EkPa was E Range: 4.24-9.41 kPa, E Mean: 6.39 kPa, E Median: 6.10 kPa, E Std: 1.54 kPa. The results suggested a fibrosis level of F1.    ENDOSCOPIC PROCEDURES:  Not available or performed    RADIOLOGY:  3/2017. Ultrasound of liver. Mild increased echogenicity. No liver mass lesions. No dilated bile ducts. No ascites. 5/2018. Ultrasound of the liver. Mild increased echogenicity. No liver mass lesions. No dilated bile ducts. No ascites. 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  Alkaline phosphate is normal.  Liver function is normal.  The platelet count is normal.      The patient is receiving treatment with Tacrolimus 1 mg twice daily. Patient reports she is only taking Tacrolimus 1 mg daily. The patient was directed to continue all other medications at the current dosages. There are no contraindications for the patient to take any medications that are necessary for treatment of other medical issues. The need to assess liver fibrosis was discussed. Sheer wave elastography can assess liver fibrosis and determine if a patient has advanced fibrosis or cirrhosis without the need for liver biopsy. Sheer wave elastography is now available at [facility]. Elastography can be repeated annually to demonstrate that the treatment is resolving hepatic fibrosis. The patient was counseled regarding alcohol consumption. Vaccination for viral hepatitis A and B is recommended since the patient has no serologic evidence of previous exposure or vaccination with immunity. Will perform laboratory testing to monitor liver function and degree of liver injury. This will include BMP, hepatic panel, CBC with platelet count. Will perform laboratory testing to monitor immune suppression medication. This will include tacrolimus level. All of the above issues were discussed with the patient. All questions were answered. The patient expressed a clear understanding of the above. [facility] 87 in 6 months for routine monitoring.        Hayden Anne, AGPCNP-BC  [address] Liver [place] [address], [address] [place] [address] Street - [address]  [phone]

## 2019-05-23 LAB — TACROLIMUS BLD-MCNC: NORMAL NG/ML (ref 2–20)

## 2019-06-20 ENCOUNTER — HOSPITAL ENCOUNTER (OUTPATIENT)
Dept: ULTRASOUND IMAGING | Age: 64
Discharge: HOME | End: 2019-06-20
Attending: NURSE PRACTITIONER
Payer: MEDICARE

## 2019-06-20 DIAGNOSIS — K75.4 AUTOIMMUNE HEPATITIS (HCC): ICD-10-CM

## 2019-06-20 PROCEDURE — 76981 USE PARENCHYMA: CPT

## 2019-06-23 NOTE — PROGRESS NOTES
Please call patient and let them know that their recent ultrasound is stable with no concerning lesions/masses within the liver. Elastography correlates with F4 fibrosis however results are not accurate d/t wide range and high standard deviation. I will see them at their next follow up appointment.

## 2019-09-04 RX ORDER — TACROLIMUS 1 MG/1
CAPSULE ORAL
Qty: 180 CAP | Refills: 3 | Status: SHIPPED | OUTPATIENT
Start: 2019-09-04 | End: 2020-06-28

## 2019-11-21 ENCOUNTER — HOSPITAL ENCOUNTER (OUTPATIENT)
Dept: LAB | Age: 64
Discharge: HOME | End: 2019-11-21
Payer: MEDICARE

## 2019-11-21 ENCOUNTER — OFFICE VISIT (OUTPATIENT)
Dept: HEMATOLOGY | Age: 64
End: 2019-11-21

## 2019-11-21 VITALS
HEIGHT: 63 IN | BODY MASS INDEX: 51.91 KG/M2 | DIASTOLIC BLOOD PRESSURE: 80 MMHG | OXYGEN SATURATION: 96 % | HEART RATE: 73 BPM | WEIGHT: 293 LBS | TEMPERATURE: 98.2 F | SYSTOLIC BLOOD PRESSURE: 130 MMHG

## 2019-11-21 DIAGNOSIS — K75.4 AUTOIMMUNE HEPATITIS (HCC): Primary | ICD-10-CM

## 2019-11-21 DIAGNOSIS — K75.4 AUTOIMMUNE HEPATITIS (HCC): ICD-10-CM

## 2019-11-21 LAB
ALBUMIN SERPL-MCNC: 3.5 G/DL (ref 3.4–5)
ALBUMIN/GLOB SERPL: 0.8 {RATIO} (ref 0.8–1.7)
ALP SERPL-CCNC: 101 U/L (ref 45–117)
ALT SERPL-CCNC: 11 U/L (ref 13–56)
ANION GAP SERPL CALC-SCNC: 7 MMOL/L (ref 3–18)
AST SERPL-CCNC: 8 U/L (ref 10–38)
BASOPHILS # BLD: 0 K/UL (ref 0–0.1)
BASOPHILS NFR BLD: 0 % (ref 0–2)
BILIRUB DIRECT SERPL-MCNC: 0.1 MG/DL (ref 0–0.2)
BILIRUB SERPL-MCNC: 0.5 MG/DL (ref 0.2–1)
BUN SERPL-MCNC: 10 MG/DL (ref 7–18)
BUN/CREAT SERPL: 15 (ref 12–20)
CALCIUM SERPL-MCNC: 8.5 MG/DL (ref 8.5–10.1)
CHLORIDE SERPL-SCNC: 104 MMOL/L (ref 100–111)
CO2 SERPL-SCNC: 29 MMOL/L (ref 21–32)
CREAT SERPL-MCNC: 0.68 MG/DL (ref 0.6–1.3)
DIFFERENTIAL METHOD BLD: ABNORMAL
EOSINOPHIL # BLD: 0.3 K/UL (ref 0–0.4)
EOSINOPHIL NFR BLD: 5 % (ref 0–5)
ERYTHROCYTE [DISTWIDTH] IN BLOOD BY AUTOMATED COUNT: 15.2 % (ref 11.6–14.5)
GLOBULIN SER CALC-MCNC: 4.2 G/DL (ref 2–4)
GLUCOSE SERPL-MCNC: 105 MG/DL (ref 74–99)
HCT VFR BLD AUTO: 39.1 % (ref 35–45)
HGB BLD-MCNC: 12.1 G/DL (ref 12–16)
LYMPHOCYTES # BLD: 2.5 K/UL (ref 0.9–3.6)
LYMPHOCYTES NFR BLD: 40 % (ref 21–52)
MCH RBC QN AUTO: 26.9 PG (ref 24–34)
MCHC RBC AUTO-ENTMCNC: 30.9 G/DL (ref 31–37)
MCV RBC AUTO: 87.1 FL (ref 74–97)
MONOCYTES # BLD: 0.5 K/UL (ref 0.05–1.2)
MONOCYTES NFR BLD: 8 % (ref 3–10)
NEUTS SEG # BLD: 2.9 K/UL (ref 1.8–8)
NEUTS SEG NFR BLD: 47 % (ref 40–73)
PLATELET # BLD AUTO: 264 K/UL (ref 135–420)
PMV BLD AUTO: 9.8 FL (ref 9.2–11.8)
POTASSIUM SERPL-SCNC: 4 MMOL/L (ref 3.5–5.5)
PROT SERPL-MCNC: 7.7 G/DL (ref 6.4–8.2)
RBC # BLD AUTO: 4.49 M/UL (ref 4.2–5.3)
SODIUM SERPL-SCNC: 140 MMOL/L (ref 136–145)
WBC # BLD AUTO: 6.1 K/UL (ref 4.6–13.2)

## 2019-11-21 PROCEDURE — 36415 COLL VENOUS BLD VENIPUNCTURE: CPT

## 2019-11-21 PROCEDURE — 80076 HEPATIC FUNCTION PANEL: CPT

## 2019-11-21 PROCEDURE — 85025 COMPLETE CBC W/AUTO DIFF WBC: CPT

## 2019-11-21 PROCEDURE — 80048 BASIC METABOLIC PNL TOTAL CA: CPT

## 2019-11-21 PROCEDURE — 80197 ASSAY OF TACROLIMUS: CPT

## 2019-11-21 RX ORDER — SPIRONOLACTONE 100 MG/1
TABLET, FILM COATED ORAL
Qty: 90 TAB | Refills: 3 | Status: SHIPPED | OUTPATIENT
Start: 2019-11-21 | End: 2020-12-10

## 2019-11-21 NOTE — PROGRESS NOTES
Vinicio Fuentes is a 75 y.o. female      1. Have you been to the ER, urgent care clinic or hospitalized since your last visit? NO.     2. Have you seen or consulted any other health care providers outside of the [address] [place] [address] since your last visit (Include any pap smears or colon screening)?  NO          Learning Assessment 2/15/2018   PRIMARY LEARNER Patient   HIGHEST LEVEL OF EDUCATION - PRIMARY LEARNER  GRADUATED HIGH SCHOOL OR GED   BARRIERS PRIMARY LEARNER NONE   CO-LEARNER CAREGIVER No   PRIMARY LANGUAGE ENGLISH   LEARNER PREFERENCE PRIMARY DEMONSTRATION   ANSWERED BY patient   RELATIONSHIP SELF

## 2019-11-21 NOTE — PROGRESS NOTES
[address] [facility], MD, Inna Silvestre, MD Bob Lovett, EROS Parra, [facility]-BC     Marlene CRAVEN Debbie, [facility]   ALIZA Barrow-REGGIE Olson, [facility]       [facility] [place] [facility] 136    at [address], [address] [place] [address], [facility] 22.    [phone]    FAX: [address] [place] [address]    at [address], [address]    [phone]    FAX: [phone]     Patient Care Team:  Chin Doe MD as PCP - General (Family Practice)  Haris Griggs MD (Gastroenterology)      Problem List  Date Reviewed: 11/21/2018          Codes Class Noted    Obesity, morbid [facility]) ICD-10-CM: E66.01  ICD-9-CM: 278.01  2/15/2018        Autoimmune hepatitis ([facility] 75.) ICD-10-CM: K75.4  ICD-9-CM: 571.42  5/30/2017        Diabetes mellitus, type 2 ([facility].) ICD-10-CM: E11.9  ICD-9-CM: 250.00  5/30/2017        Edema ICD-10-CM: R60.9  ICD-9-CM: 782.3  5/30/2017        Hypertension ICD-10-CM: I10  ICD-9-CM: 401.9  5/30/2017        Hypercholesterolemia ICD-10-CM: E78.00  ICD-9-CM: 272.0  5/30/2017              Risa Sauer returns to the [address] for management of autoimmune hepatitis. The active problem list, all pertinent past medical history, medications, and laboratory findings related to the liver disorder were reviewed with the patient. The patient is a 75 y.o. Black female who was first noted to have abnormalities in liver transaminases and alkaline phosphase in 3/2017. Further evaluation of these abnormalities has included serologic studies which were positive for ASMA    Ultrasound of the liver was performed in 3/2017. This demonstrated slightly increased echogenicity. A liver biopsy was performed in 3/2017. This demonstrated severe inflammation and portal fibrosis. The patient notes swelling of the lower extremities. The patient has not experienced pain in the right side over the liver, yellowing of the eyes or skin, problems concentrating. The patient has moderate limitations in functional activities which can be attributed to other medical problems that are not related to the liver disease. ALLERGIES  No Known Allergies    MEDICATIONS  Current Outpatient Medications   Medication Sig    spironolactone (ALDACTONE) 100 mg tablet TAKE 1 TABLET EVERY DAY    tacrolimus (PROGRAF) 1 mg capsule TAKE 1 CAPSULE EVERY 12 HOURS    furosemide (LASIX) 40 mg tablet TAKE 1 TABLET EVERY DAY    lisinopril (PRINIVIL, ZESTRIL) 10 mg tablet Take 10 mg by mouth.  aspirin delayed-release 81 mg tablet Take  by mouth daily.  amLODIPine (NORVASC) 2.5 mg tablet     LANTUS SOLOSTAR 100 unit/mL (3 mL) inpn 26 Units daily. No current facility-administered medications for this visit. SYSTEM REVIEW NOT RELATED TO LIVER DISEASE OR REVIEWED ABOVE:  Constitution systems: morbid obesity  Eyes: Negative for visual changes. ENT: Negative for sore throat, painful swallowing. Respiratory: Negative for cough, hemoptysis, SOB. Cardiology: Negative for chest pain, palpitations. GI:  Negative for constipation or diarrhea. : Negative for urinary frequency, dysuria, hematuria, nocturia. Skin: Negative for rash. Hematology: Negative for easy bruising, blood clots. Musculo-skelatal: Arthritis of knees. Neurologic: Negative for headaches, dizziness, vertigo, memory problems not related to HE. Psychology: Negative for anxiety, depression. FAMILY HISTORY:  The father  of DM. The mother  of Sarcoidosis. There is no family history of liver disease. There is no family history of immune disorders. SOCIAL HISTORY:  The patient is . The patient has 2 children, and 7 grandchildren. The patient has never used tobacco products. The patient consumes alcohol on social occasions never in excess. The patient has been abstinent from alcohol since 3/2017. The patient used to work in [employer] for children. The patient has not worked since 7/2015. PHYSICAL EXAMINATION:  Visit Vitals  /80 (BP 1 Location: Right arm, BP Patient Position: Sitting)   Pulse 73   Temp 98.2 °F (36.8 °C)   Ht 5' 3\" (1.6 m)   Wt 308 lb (139.7 kg)   SpO2 96%   BMI 54.56 kg/m²     General: No acute distress. Eyes: Sclera anicteric. ENT: No oral lesions. Thyroid normal.  Nodes: No adenopathy. Skin: No spider angiomata. No jaundice. No palmar erythema. Respiratory: Lungs clear to auscultation. Cardiovascular: Regular heart rate. No murmurs. No JVD. Abdomen: Unable to perform due to habitus  Extremities: No edema. No muscle wasting. No gross arthritic changes. Neurologic: Alert and oriented. Cranial nerves grossly intact. No asterixsis.     LABORATORY STUDIES:  Liver [place] of [address] Units 5/20/2019 3/8/2019   WBC 4.6 - 13.2 K/uL 7.4 6.5   ANC 1.8 - 8.0 K/UL 3.5 2.8   HGB 12.0 - 16.0 g/dL 12.7 12.0    - 420 K/uL 270 221   AST 15 - 37 U/L 11 (L) 10 (L)   ALT 13 - 56 U/L 13 12 (L)   Alk Phos 45 - 117 U/L 101 113   Bili, Total 0.2 - 1.0 MG/DL 0.4 0.3   Bili, Direct 0.0 - 0.2 MG/DL 0.1    Albumin 3.4 - 5.0 g/dL 3.5 3.5   BUN 7.0 - 18 MG/DL 19 (H) 12   Creat 0.6 - 1.3 MG/DL 0.81 0.63   Na 136 - 145 mmol/L 142 142   K 3.5 - 5.5 mmol/L 4.2 3.8   Cl 100 - 108 mmol/L 104 108   CO2 21 - 32 mmol/L 30 26   Glucose 74 - 99 mg/dL 101 (H) 95   Magnesium 1.6 - 2.6 mg/dL  2.2     TACROLIMUS LEVEL:  Liver Virology and Transplant Immune Suppression Latest Ref Rng & Units 5/20/2019   Tacrolimus Level 2.0 - 20.0 ng/mL None detected     Liver Virology and Transplant Immune Suppression Latest Ref Rng & Units 7/26/2018   Tacrolimus Level 2.0 - 20.0 ng/mL None Detected     Liver Virology and Transplant Immune Suppression Latest Ref Rng & Units 4/26/2018   Tacrolimus Level 2.0 - 20.0 ng/mL 2.2     SEROLOGIES:  3/2017. HBsAntigen negative, anti-HCV negative, Ferritin 239, iron saturation 11%, KAYLEE negative, ASMA positive 1:320,     LIVER HISTOLOGY:  3/2017. Severe active hepatitis with PMN and portal fibrosis. Biopsy slides not reviewed by MLS.  5/2018. Shear wave elastography performed at [facility]. EkPa was E Range: 4.24-9.41 kPa, E Mean: 6.39 kPa, E Median: 6.10 kPa, E Std: 1.54 kPa. The results suggested a fibrosis level of F1.  11/2019. Shear wave elastography performed at [facility]. EkPa was E Range: 10.32 kPa, E Mean: 12.49 kPa, E Median: 13.02 kPa, E Std: 5.68 kPa. The results suggested a fibrosis level of F4. ENDOSCOPIC PROCEDURES:  Not available or performed    RADIOLOGY:  3/2017. Ultrasound of liver. Mild increased echogenicity. No liver mass lesions. No dilated bile ducts. No ascites. 5/2018. Ultrasound of the liver. Mild increased echogenicity. No liver mass lesions. No dilated bile ducts. No ascites. 6/2019. Ultrasound of the liver. Mild increased echogenicity. No liver mass lesions. No dilated bile ducts. No ascites. OTHER TESTING:  Not available or performed    ASSESSMENT AND PLAN:  Autoimmune Hepatitis with portal fibrosis    Liver transaminases are normal.  Alkaline phosphate is normal.  Liver function is normal.  The platelet count is normal.      The patient is receiving treatment with Tacrolimus 1 mg twice daily. Patient reports she is only taking Tacrolimus 1 mg daily. The patient was directed to continue all other medications at the current dosages. There are no contraindications for the patient to take any medications that are necessary for treatment of other medical issues. The need to assess liver fibrosis was discussed. Shear wave elastography can assess liver fibrosis and determine if a patient has advanced fibrosis or cirrhosis without the need for liver biopsy. This will be scheduled. The elastography can be repeated annually or as often as clinically indicated to assess for fibrosis progression and/or regression. The patient was counseled regarding alcohol consumption. Vaccination for viral hepatitis A and B is recommended since the patient has no serologic evidence of previous exposure or vaccination with immunity. Will perform laboratory testing to monitor liver function and degree of liver injury. This will include BMP, hepatic panel, CBC with platelet count. Will perform laboratory testing to monitor immune suppression medication. This will include tacrolimus level. All of the above issues were discussed with the patient. All questions were answered. The patient expressed a clear understanding of the above. [facility] 87 in 6 months for routine monitoring.        Verna Osuna, AGPCNP-BC  [address] Liver [place] [address] Kasandra Manley, [address]  [phone]

## 2019-11-24 LAB — TACROLIMUS BLD-MCNC: NORMAL NG/ML (ref 2–20)

## 2020-02-09 ENCOUNTER — HOSPITAL ENCOUNTER (EMERGENCY)
Age: 65
Discharge: HOME | End: 2020-02-09
Attending: EMERGENCY MEDICINE
Payer: MEDICARE

## 2020-02-09 VITALS
WEIGHT: 293 LBS | TEMPERATURE: 98.6 F | SYSTOLIC BLOOD PRESSURE: 189 MMHG | HEART RATE: 78 BPM | DIASTOLIC BLOOD PRESSURE: 72 MMHG | OXYGEN SATURATION: 99 % | HEIGHT: 63 IN | RESPIRATION RATE: 14 BRPM | BODY MASS INDEX: 51.91 KG/M2

## 2020-02-09 DIAGNOSIS — T78.3XXA ANGIOEDEMA DUE TO ANGIOTENSIN CONVERTING ENZYME INHIBITOR (ACE-I): Primary | ICD-10-CM

## 2020-02-09 DIAGNOSIS — T46.4X5A ANGIOEDEMA DUE TO ANGIOTENSIN CONVERTING ENZYME INHIBITOR (ACE-I): Primary | ICD-10-CM

## 2020-02-09 PROCEDURE — 74011000258 HC RX REV CODE- 258: Performed by: EMERGENCY MEDICINE

## 2020-02-09 PROCEDURE — 96365 THER/PROPH/DIAG IV INF INIT: CPT

## 2020-02-09 PROCEDURE — 74011250636 HC RX REV CODE- 250/636

## 2020-02-09 PROCEDURE — 96375 TX/PRO/DX INJ NEW DRUG ADDON: CPT

## 2020-02-09 PROCEDURE — 74011000250 HC RX REV CODE- 250: Performed by: EMERGENCY MEDICINE

## 2020-02-09 PROCEDURE — 99283 EMERGENCY DEPT VISIT LOW MDM: CPT

## 2020-02-09 RX ORDER — DIPHENHYDRAMINE HYDROCHLORIDE 50 MG/ML
50 INJECTION, SOLUTION INTRAMUSCULAR; INTRAVENOUS
Status: COMPLETED | OUTPATIENT
Start: 2020-02-09 | End: 2020-02-09

## 2020-02-09 RX ORDER — FAMOTIDINE 10 MG/ML
INJECTION INTRAVENOUS
Status: COMPLETED
Start: 2020-02-09 | End: 2020-02-09

## 2020-02-09 RX ORDER — DIPHENHYDRAMINE HYDROCHLORIDE 50 MG/ML
INJECTION, SOLUTION INTRAMUSCULAR; INTRAVENOUS
Status: COMPLETED
Start: 2020-02-09 | End: 2020-02-09

## 2020-02-09 RX ORDER — FAMOTIDINE 10 MG/ML
20 INJECTION INTRAVENOUS
Status: COMPLETED | OUTPATIENT
Start: 2020-02-09 | End: 2020-02-09

## 2020-02-09 RX ADMIN — FAMOTIDINE 20 MG: 10 INJECTION, SOLUTION INTRAVENOUS at 17:33

## 2020-02-09 RX ADMIN — FAMOTIDINE 20 MG: 10 INJECTION INTRAVENOUS at 17:33

## 2020-02-09 RX ADMIN — TRANEXAMIC ACID 1000 MG: 1 INJECTION, SOLUTION INTRAVENOUS at 17:41

## 2020-02-09 RX ADMIN — DIPHENHYDRAMINE HYDROCHLORIDE 50 MG: 50 INJECTION, SOLUTION INTRAMUSCULAR; INTRAVENOUS at 17:24

## 2020-02-09 RX ADMIN — METHYLPREDNISOLONE SODIUM SUCCINATE 125 MG: 125 INJECTION, POWDER, FOR SOLUTION INTRAMUSCULAR; INTRAVENOUS at 17:30

## 2020-02-09 NOTE — ED TRIAGE NOTES
Pt arrives to ED with c\o bottom lip swelling, pt sts \"I feel like its getting harder to swallow\", pt managing own secretions in triage, pt is able to make needs known speaking in complete sentences, pt in nad at this time

## 2020-02-09 NOTE — ED NOTES
Pt states she is feeling better, throat not as tight feeling, continues to sound hoarse.  Pt resting on stretcher, call bell within reach, side rails up, no needs expressed at this time

## 2020-02-09 NOTE — ED PROVIDER NOTES
EMERGENCY DEPARTMENT HISTORY AND PHYSICAL EXAM    Date: 2/9/2020  Patient Name: Juan Nolasco    History of Presenting Illness     Chief Complaint   Patient presents with    Lip Swelling         History Provided By: Patient    [id number]  Juan Nolasco is a 75 y.o. female with PMHX of autoimmune hepatitis, hypertension, diabetes who presents to the emergency department C/O lip swelling. She is had intermittent lip swelling for the past 3 to 4 weeks. States she was recently bumped from 10 mg lisinopril daily to 20 mg. She did not discuss to intermittent swelling with her primary care provider. ER today due to lower lip swelling however she is also saying she feels like her throat is tighter and she has some occulta breathing. Patient is able speak full sentences and is not having difficulty managing secretions. PCP: Manish Resendez MD    Current Outpatient Medications   Medication Sig Dispense Refill    spironolactone (ALDACTONE) 100 mg tablet TAKE 1 TABLET EVERY DAY 90 Tab 3    tacrolimus (PROGRAF) 1 mg capsule TAKE 1 CAPSULE EVERY 12 HOURS 180 Cap 3    furosemide (LASIX) 40 mg tablet TAKE 1 TABLET EVERY DAY 90 Tab 3    aspirin delayed-release 81 mg tablet Take  by mouth daily.  amLODIPine (NORVASC) 2.5 mg tablet       LANTUS SOLOSTAR 100 unit/mL (3 mL) inpn 26 Units daily. Past History     Past Medical History:  Past Medical History:   Diagnosis Date    Diabetes ([address]     Hypertension     Liver disease        Past Surgical History:  No past surgical history on file. Family History:  No family history on file. Social History:  Social History     Tobacco Use    Smoking status: Never Smoker    Smokeless tobacco: Never Used   Substance Use Topics    Alcohol use: Yes     Comment: 2 Drinks every 2 months    Drug use: No       Allergies:   Allergies   Allergen Reactions    Lisinopril Angioedema         Review of Systems   Review of Systems   Constitutional: Negative for chills and fever.   HENT: Positive for facial swelling and trouble swallowing. Negative for congestion, drooling, rhinorrhea, sinus pain and voice change. Respiratory: Negative for cough and shortness of breath. Cardiovascular: Negative for chest pain and palpitations. Gastrointestinal: Negative for abdominal distention, abdominal pain, blood in stool, diarrhea and nausea. Genitourinary: Negative for difficulty urinating and dysuria. Musculoskeletal: Negative for back pain and neck pain. Skin: Negative for color change and rash. Neurological: Negative for dizziness and headaches. All other systems reviewed and are negative. Physical Exam     Vitals:    02/09/20 1654 02/09/20 1800   BP: 183/65 189/72   Pulse: 78    Resp: 14    Temp: 98.6 °F (37 °C)    SpO2: 100% 99%   Weight: 141.5 kg (312 lb)    Height: 5' 3\" (1.6 m)      Physical Exam  Vitals signs and nursing note reviewed. Constitutional:       General: She is not in acute distress. Appearance: She is well-developed. HENT:      Head: Normocephalic and atraumatic. Mouth/Throat:      Mouth: Mucous membranes are moist. Angioedema (lower lip right>left (mild)) present. Palate: No mass. Pharynx: Oropharynx is clear. Uvula midline. No oropharyngeal exudate or uvula swelling. Comments: malipatti class II  Eyes:      Conjunctiva/sclera: Conjunctivae normal.      Pupils: Pupils are equal, round, and reactive to light. Comments: Xanthelasma   Neck:      Musculoskeletal: Normal range of motion and neck supple. Cardiovascular:      Rate and Rhythm: Normal rate and regular rhythm. Heart sounds: Normal heart sounds. Pulmonary:      Effort: Pulmonary effort is normal. No respiratory distress. Breath sounds: Normal breath sounds. No wheezing or rales. Chest:      Chest wall: No tenderness. Abdominal:      General: There is no distension. Palpations: Abdomen is soft. Tenderness:  There is no abdominal tenderness. There is no guarding or rebound. Musculoskeletal: Normal range of motion. General: No tenderness. Lymphadenopathy:      Cervical: No cervical adenopathy. Skin:     General: Skin is warm and dry. Neurological:      Mental Status: She is alert and oriented to person, place, and time. Cranial Nerves: No cranial nerve deficit. Motor: No abnormal muscle tone. Coordination: Coordination normal.   Psychiatric:         Behavior: Behavior normal.         Diagnostic Study Results     Labs -   No results found for this or any previous visit (from the past 12 hour(s)). Radiologic Studies -   No orders to display     CT Results  (Last 48 hours)    None        CXR Results  (Last 48 hours)    None          Medications given in the ED-  Medications   diphenhydrAMINE (BENADRYL) injection 50 mg (50 mg IntraVENous Given 2/9/20 1724)   methylPREDNISolone (PF) (Solu-MEDROL) injection 125 mg (125 mg IntraVENous Given 2/9/20 1730)   famotidine (PF) (PEPCID) injection 20 mg (20 mg IntraVENous Given 2/9/20 1733)   tranexamic acid (CYKLOKAPRON) 1,000 mg in 0.9% sodium chloride (MBP/ADV) 110 mL MBP (0 mg IntraVENous IV Completed 2/9/20 1814)         Medical Decision Making   I am the first provider for this patient. I reviewed the vital signs, available nursing notes, past medical history, past surgical history, family history and social history. Vital Signs-Reviewed the patient's vital signs. Pulse Oximetry Analysis - 100% on RA     Cardiac Monitor:  Rate: 78 bpm  Rhythm: NSR        Records Reviewed: Nursing Notes and Old Medical Records    Provider Notes (Medical Decision Making): Lavelle Yoo is a 75 y.o. female Drake Cesar with minor lip swelling and reports feeling throat tightness, difficulty swallowing. Her airway is patent and she is not having difficulty secretions. She has mild to moderate angioedema appears isolated to the lip -no tongue swelling or elevation.   Does not require immediate intubation. Will give give typical antihistamine course and also TXA for bradykinin induced angioedema and closely monitor patient    Procedures:  Procedures    ED Course:   6:47 PM  Patient's feels that symptoms have improved. I am unable to detect significant swelling of patient's lower lip. She reports feeling some improvement after medical therapy. She has had no difficulty breathing or airway issues during her evaluation here. She states she is symptoms began over 6 hours ago and she reports having episodes of lip swelling throughout the past few weeks that are intermittent. As I feel patient is safe for discharge. I counseled patient if her symptoms get worse or she has any difficulty breathing or managing secretions she needs to call 911 and return directly to ER. Patient states she would be able to do this if it were to happen again. Have instructed patient to discontinue throughout all lisinopril and that she can never take that medication again. I have instructed her to follow-up with her primary care doctor soon if possible so they can establish new plan of care for patient's hypertension        Diagnosis and Disposition     Critical Care:     DISCHARGE NOTE:    Beau Bo  results have been reviewed with her. She has been counseled regarding her diagnosis, treatment, and plan. She verbally conveys understanding and agreement of the signs, symptoms, diagnosis, treatment and prognosis and additionally agrees to follow up as discussed. She also agrees with the care-plan and conveys that all of her questions have been answered. I have also provided discharge instructions for her that include: educational information regarding their diagnosis and treatment, and list of reasons why they would want to return to the ED prior to their follow-up appointment, should her condition change. She has been provided with education for proper emergency department utilization.      CLINICAL IMPRESSION:    1. Angioedema due to angiotensin converting enzyme inhibitor (ACE-I)        PLAN:  1. D/C Home  2. Current Discharge Medication List        3. Follow-up Information     Follow up With Specialties Details Why Contact Info    Trino Gerard MD Family Practice Schedule an appointment as soon as possible for a visit in 1 day  [address] [facility]      [facility] EMERGENCY DEPT Emergency Medicine  If symptoms worsen [address] [facility] [address]  [phone]        _______________________________      Please note that this dictation was completed with [employer], the computer voice recognition software. Quite often unanticipated grammatical, syntax, homophones, and other interpretive errors are inadvertently transcribed by the computer software. Please disregard these errors. Please excuse any errors that have escaped final proofreading.

## 2020-02-09 NOTE — DISCHARGE INSTRUCTIONS
Do not take lisinopril again. Please throw out any remaining pills. Follow up with your doctor tomorrow to discuss you medication change. Return to ER right away if swelling returns or you have any trouble breathing.

## 2020-03-02 ENCOUNTER — APPOINTMENT (OUTPATIENT)
Dept: ULTRASOUND IMAGING | Age: 65
End: 2020-03-02
Attending: PHYSICIAN ASSISTANT
Payer: MEDICARE

## 2020-03-02 ENCOUNTER — HOSPITAL ENCOUNTER (EMERGENCY)
Age: 65
Discharge: HOME | End: 2020-03-03
Attending: EMERGENCY MEDICINE
Payer: MEDICARE

## 2020-03-02 DIAGNOSIS — N95.0 POST-MENOPAUSAL BLEEDING: Primary | ICD-10-CM

## 2020-03-02 DIAGNOSIS — N39.0 UTI (URINARY TRACT INFECTION), UNCOMPLICATED: ICD-10-CM

## 2020-03-02 LAB
ALBUMIN SERPL-MCNC: 3.7 G/DL (ref 3.4–5)
ALBUMIN/GLOB SERPL: 0.8 {RATIO} (ref 0.8–1.7)
ALP SERPL-CCNC: 122 U/L (ref 45–117)
ALT SERPL-CCNC: 15 U/L (ref 13–56)
ANION GAP SERPL CALC-SCNC: 6 MMOL/L (ref 3–18)
AST SERPL-CCNC: 10 U/L (ref 10–38)
BASOPHILS # BLD: 0 K/UL (ref 0–0.1)
BASOPHILS NFR BLD: 0 % (ref 0–2)
BILIRUB SERPL-MCNC: 0.5 MG/DL (ref 0.2–1)
BUN SERPL-MCNC: 15 MG/DL (ref 7–18)
BUN/CREAT SERPL: 17 (ref 12–20)
CALCIUM SERPL-MCNC: 9.2 MG/DL (ref 8.5–10.1)
CHLORIDE SERPL-SCNC: 104 MMOL/L (ref 100–111)
CO2 SERPL-SCNC: 31 MMOL/L (ref 21–32)
CREAT SERPL-MCNC: 0.87 MG/DL (ref 0.6–1.3)
DIFFERENTIAL METHOD BLD: ABNORMAL
EOSINOPHIL # BLD: 0.3 K/UL (ref 0–0.4)
EOSINOPHIL NFR BLD: 3 % (ref 0–5)
ERYTHROCYTE [DISTWIDTH] IN BLOOD BY AUTOMATED COUNT: 16.2 % (ref 11.6–14.5)
GLOBULIN SER CALC-MCNC: 4.8 G/DL (ref 2–4)
GLUCOSE SERPL-MCNC: 141 MG/DL (ref 74–99)
HCT VFR BLD AUTO: 41.8 % (ref 35–45)
HGB BLD-MCNC: 13.3 G/DL (ref 12–16)
LYMPHOCYTES # BLD: 3 K/UL (ref 0.9–3.6)
LYMPHOCYTES NFR BLD: 32 % (ref 21–52)
MCH RBC QN AUTO: 26.5 PG (ref 24–34)
MCHC RBC AUTO-ENTMCNC: 31.8 G/DL (ref 31–37)
MCV RBC AUTO: 83.4 FL (ref 74–97)
MONOCYTES # BLD: 0.7 K/UL (ref 0.05–1.2)
MONOCYTES NFR BLD: 7 % (ref 3–10)
NEUTS SEG # BLD: 5.3 K/UL (ref 1.8–8)
NEUTS SEG NFR BLD: 58 % (ref 40–73)
PLATELET # BLD AUTO: 288 K/UL (ref 135–420)
PMV BLD AUTO: 9.4 FL (ref 9.2–11.8)
POTASSIUM SERPL-SCNC: 3.9 MMOL/L (ref 3.5–5.5)
PROT SERPL-MCNC: 8.5 G/DL (ref 6.4–8.2)
RBC # BLD AUTO: 5.01 M/UL (ref 4.2–5.3)
SERVICE CMNT-IMP: NORMAL
SODIUM SERPL-SCNC: 141 MMOL/L (ref 136–145)
WBC # BLD AUTO: 9.2 K/UL (ref 4.6–13.2)
WET PREP GENITAL: NORMAL

## 2020-03-02 PROCEDURE — 85025 COMPLETE CBC W/AUTO DIFF WBC: CPT

## 2020-03-02 PROCEDURE — 87491 CHLMYD TRACH DNA AMP PROBE: CPT

## 2020-03-02 PROCEDURE — 87210 SMEAR WET MOUNT SALINE/INK: CPT

## 2020-03-02 PROCEDURE — 87086 URINE CULTURE/COLONY COUNT: CPT

## 2020-03-02 PROCEDURE — 80053 COMPREHEN METABOLIC PANEL: CPT

## 2020-03-02 PROCEDURE — 99285 EMERGENCY DEPT VISIT HI MDM: CPT

## 2020-03-02 PROCEDURE — 81001 URINALYSIS AUTO W/SCOPE: CPT

## 2020-03-02 PROCEDURE — 76830 TRANSVAGINAL US NON-OB: CPT

## 2020-03-03 VITALS
TEMPERATURE: 98.7 F | SYSTOLIC BLOOD PRESSURE: 132 MMHG | WEIGHT: 293 LBS | HEART RATE: 75 BPM | HEIGHT: 63 IN | RESPIRATION RATE: 20 BRPM | DIASTOLIC BLOOD PRESSURE: 91 MMHG | BODY MASS INDEX: 51.91 KG/M2 | OXYGEN SATURATION: 99 %

## 2020-03-03 LAB
APPEARANCE UR: ABNORMAL
BACTERIA URNS QL MICRO: ABNORMAL /HPF
BILIRUB UR QL: NEGATIVE
COLOR UR: YELLOW
EPITH CASTS URNS QL MICRO: ABNORMAL /LPF (ref 0–5)
GLUCOSE UR STRIP.AUTO-MCNC: NEGATIVE MG/DL
HGB UR QL STRIP: ABNORMAL
KETONES UR QL STRIP.AUTO: NEGATIVE MG/DL
LEUKOCYTE ESTERASE UR QL STRIP.AUTO: ABNORMAL
NITRITE UR QL STRIP.AUTO: NEGATIVE
PH UR STRIP: 6 [PH] (ref 5–8)
PROT UR STRIP-MCNC: NEGATIVE MG/DL
RBC #/AREA URNS HPF: ABNORMAL /HPF (ref 0–5)
SP GR UR REFRACTOMETRY: 1.02 (ref 1–1.03)
UROBILINOGEN UR QL STRIP.AUTO: 1 EU/DL (ref 0.2–1)
WBC URNS QL MICRO: ABNORMAL /HPF (ref 0–5)

## 2020-03-03 PROCEDURE — 74011250637 HC RX REV CODE- 250/637: Performed by: PHYSICIAN ASSISTANT

## 2020-03-03 RX ORDER — CEPHALEXIN 250 MG/1
500 CAPSULE ORAL
Status: COMPLETED | OUTPATIENT
Start: 2020-03-03 | End: 2020-03-03

## 2020-03-03 RX ORDER — CEPHALEXIN 500 MG/1
500 CAPSULE ORAL 2 TIMES DAILY
Qty: 14 CAP | Refills: 0 | Status: SHIPPED | OUTPATIENT
Start: 2020-03-03 | End: 2020-03-10

## 2020-03-03 RX ADMIN — CEPHALEXIN 500 MG: 250 CAPSULE ORAL at 03:22

## 2020-03-03 NOTE — ED NOTES
Pt home ambulatory. Friend at side as . Helped to car in a w/c. Rx in hand. To f/u per orders  Patient armband removed and shredded  I have reviewed discharge instructions with the patient. The patient verbalized understanding. Discharge medications reviewed with patient and appropriate educational materials and side effects teaching were provided.   Follow-up Information     Follow up With Specialties Details Why Contact Info    your GYN  Schedule an appointment as soon as possible for a visit      Jing Lozoya MD Obstetrics & Gynecology, Gynecology, Obstetrics Schedule an appointment as soon as possible for a visit  [address] [facility]      Placido Hoffman MD Family Practice Schedule an appointment as soon as possible for a visit  [address] [facility] [place]      [facility] EMERGENCY DEPT Emergency Medicine  As needed, If symptoms worsen [address] [address] [phone]

## 2020-03-03 NOTE — ED PROVIDER NOTES
EMERGENCY DEPARTMENT HISTORY AND PHYSICAL EXAM    Date: 3/2/2020  Patient Name: Jeffrey Matute    History of Presenting Illness     Chief Complaint   Patient presents with    Abdominal Pain    Vaginal Bleeding    Neck Pain    Hand Pain         History Provided By: Patient    Jeffrey Matute is a 75 y.o. female with PMHX of diabetes, hypertension who presents to the emergency department C/O vaginal bleeding. Associated sxs include abdominal pain. Patient reports 2 days of abdominal pain with vaginal bleeding. Patient states that she had a similar occurrence approximately 3 years ago where she was seen by a GYN through patch had biopsies done with negative results. Pt denies fever, nausea vomiting diarrhea, dysuria, bleeding from anywhere else, rashes, use of blood thinners, recent illness, and any other sxs or complaints. PCP: Kenya Kearns MD    Current Facility-Administered Medications   Medication Dose Route Frequency Provider Last Rate Last Dose    cephALEXin (KEFLEX) capsule 500 mg  500 mg Oral NOW Donavan Fowler, PA         Current Outpatient Medications   Medication Sig Dispense Refill    cephALEXin (KEFLEX) 500 mg capsule Take 1 Cap by mouth two (2) times a day for 7 days. 14 Cap 0    spironolactone (ALDACTONE) 100 mg tablet TAKE 1 TABLET EVERY DAY 90 Tab 3    tacrolimus (PROGRAF) 1 mg capsule TAKE 1 CAPSULE EVERY 12 HOURS 180 Cap 3    furosemide (LASIX) 40 mg tablet TAKE 1 TABLET EVERY DAY 90 Tab 3    aspirin delayed-release 81 mg tablet Take  by mouth daily.  amLODIPine (NORVASC) 2.5 mg tablet       LANTUS SOLOSTAR 100 unit/mL (3 mL) inpn 26 Units daily. Past History     Past Medical History:  Past Medical History:   Diagnosis Date    Diabetes ([address]     Hypertension     Liver disease        Past Surgical History:  No past surgical history on file. Family History:  No family history on file.     Social History:  Social History     Tobacco Use    Smoking status: Never Smoker    Smokeless tobacco: Never Used   Substance Use Topics    Alcohol use: Yes     Comment: 2 Drinks every 2 months    Drug use: No       Allergies: Allergies   Allergen Reactions    Lisinopril Angioedema         Review of Systems   Review of Systems   Constitutional: Negative for fever. Gastrointestinal: Positive for abdominal pain. Negative for blood in stool, constipation, diarrhea, nausea and vomiting. Genitourinary: Positive for pelvic pain and vaginal bleeding. Negative for dysuria. Skin: Negative for rash. Neurological: Negative for dizziness and weakness. All other systems reviewed and are negative. Physical Exam     Vitals:    03/02/20 2145 03/03/20 0016 03/03/20 0030 03/03/20 0100   BP: 187/56 (!) 152/95 158/48 (!) 126/107   Pulse: 84      Resp: 16      Temp: 98.7 °F (37.1 °C)      SpO2: 100% 95% 100% 100%   Weight: 137 kg (302 lb)      Height: 5' 3\" (1.6 m)        Physical Exam  Vitals signs reviewed. Constitutional:       General: She is not in acute distress. Appearance: She is well-developed. She is obese. She is not ill-appearing. HENT:      Head: Normocephalic and atraumatic. Eyes:      Extraocular Movements: Extraocular movements intact. Pupils: Pupils are equal, round, and reactive to light. Cardiovascular:      Rate and Rhythm: Normal rate and regular rhythm. Pulmonary:      Effort: Pulmonary effort is normal.      Breath sounds: Normal breath sounds. Abdominal:      General: Bowel sounds are normal.      Palpations: Abdomen is soft. Tenderness: There is generalized abdominal tenderness. Genitourinary:     Vagina: Bleeding present. Skin:     General: Skin is warm and dry. Capillary Refill: Capillary refill takes less than 2 seconds. Neurological:      General: No focal deficit present. Mental Status: She is alert and oriented to person, place, and time.    Psychiatric:         Mood and Affect: Mood normal.         Behavior: Behavior normal.           Diagnostic Study Results     Labs -     Recent Results (from the past 12 hour(s))   URINALYSIS W/ RFLX MICROSCOPIC    Collection Time: 03/02/20 10:04 PM   Result Value Ref Range    Color YELLOW      Appearance CLOUDY      Specific gravity 1.017 1.005 - 1.030      pH (UA) 6.0 5.0 - 8.0      Protein NEGATIVE  NEG mg/dL    Glucose NEGATIVE  NEG mg/dL    Ketone NEGATIVE  NEG mg/dL    Bilirubin NEGATIVE  NEG      Blood LARGE (A) NEG      Urobilinogen 1.0 0.2 - 1.0 EU/dL    Nitrites NEGATIVE  NEG      Leukocyte Esterase MODERATE (A) NEG     URINE MICROSCOPIC ONLY    Collection Time: 03/02/20 10:04 PM   Result Value Ref Range    WBC 4 to 10 0 - 5 /hpf    RBC 81 to 90 0 - 5 /hpf    Epithelial cells 3+ 0 - 5 /lpf    Bacteria FEW (A) NEG /hpf   CBC WITH AUTOMATED DIFF    Collection Time: 03/02/20 10:22 PM   Result Value Ref Range    WBC 9.2 4.6 - 13.2 K/uL    RBC 5.01 4.20 - 5.30 M/uL    HGB 13.3 12.0 - 16.0 g/dL    HCT 41.8 35.0 - 45.0 %    MCV 83.4 74.0 - 97.0 FL    MCH 26.5 24.0 - 34.0 PG    MCHC 31.8 31.0 - 37.0 g/dL    RDW 16.2 (H) 11.6 - 14.5 %    PLATELET 023 097 - 706 K/uL    MPV 9.4 9.2 - 11.8 FL    NEUTROPHILS 58 40 - 73 %    LYMPHOCYTES 32 21 - 52 %    MONOCYTES 7 3 - 10 %    EOSINOPHILS 3 0 - 5 %    BASOPHILS 0 0 - 2 %    ABS. NEUTROPHILS 5.3 1.8 - 8.0 K/UL    ABS. LYMPHOCYTES 3.0 0.9 - 3.6 K/UL    ABS. MONOCYTES 0.7 0.05 - 1.2 K/UL    ABS. EOSINOPHILS 0.3 0.0 - 0.4 K/UL    ABS.  BASOPHILS 0.0 0.0 - 0.1 K/UL    DF AUTOMATED     METABOLIC PANEL, COMPREHENSIVE    Collection Time: 03/02/20 10:22 PM   Result Value Ref Range    Sodium 141 136 - 145 mmol/L    Potassium 3.9 3.5 - 5.5 mmol/L    Chloride 104 100 - 111 mmol/L    CO2 31 21 - 32 mmol/L    Anion gap 6 3.0 - 18 mmol/L    Glucose 141 (H) 74 - 99 mg/dL    BUN 15 7.0 - 18 MG/DL    Creatinine 0.87 0.6 - 1.3 MG/DL    BUN/Creatinine ratio 17 12 - 20      GFR est AA >60 >60 ml/min/1.73m2    GFR est non-AA >60 >60 ml/min/1.73m2    Calcium 9.2 8.5 - 10.1 MG/DL    Bilirubin, total 0.5 0.2 - 1.0 MG/DL    ALT (SGPT) 15 13 - 56 U/L    AST (SGOT) 10 10 - 38 U/L    Alk. phosphatase 122 (H) 45 - 117 U/L    Protein, total 8.5 (H) 6.4 - 8.2 g/dL    Albumin 3.7 3.4 - 5.0 g/dL    Globulin 4.8 (H) 2.0 - 4.0 g/dL    A-G Ratio 0.8 0.8 - 1.7     WET PREP    Collection Time: 03/02/20 10:25 PM   Result Value Ref Range    Special Requests: NO SPECIAL REQUESTS      Wet prep NO YEAST,TRICHOMONAS OR CLUE CELLS NOTED         Radiologic Studies -   US TRANSVAGINAL W DOPPLER   Final Result   IMPRESSION:   ---------------------------------------------------------------------------      The uterus is unremarkable in appearance. The endometrium measures up to 7 mm which is mildly thickened for a   postmenopausal woman. Consider endometrial hyperplasia. Follow-up recommended. The ovaries are not seen. CT Results  (Last 48 hours)    None        CXR Results  (Last 48 hours)    None          Medications given in the ED-  Medications   cephALEXin (KEFLEX) capsule 500 mg (has no administration in time range)         Medical Decision Making   I am the first provider for this patient. I reviewed the vital signs, available nursing notes, past medical history, past surgical history, family history and social history. Vital Signs-Reviewed the patient's vital signs. Pulse Oximetry Analysis - 100% on RA     Records Reviewed: Nursing Notes    Procedures:  Pelvic Exam  Date/Time: 3/2/2020 10:35 PM  Performed by: PA  Type of exam performed: bimanual and speculum. External genitalia appearance: normal.    Vaginal exam:  bleeding. Bleeding: moderate  Specimen(s) collected:  chlamydia and GC. Patient tolerance: Patient tolerated the procedure well with no immediate complications          ED Course:   10:33 PM   Initial assessment performed. The patients presenting problems have been discussed, and they are in agreement with the care plan formulated and outlined with them. I have encouraged them to ask questions as they arise throughout their visit. Discussion: 75 y.o. female 2 days of postpartum vaginal bleeding diffuse abdominal pain. Patient is afebrile with appropriate vital signs mild vaginal bleeding on exam with normal CBC ultrasound reveals mild thickening of the endometrium but otherwise normal and urinalysis with mild UTI culture pending. Patient has had similar occurrence of vaginal bleeding 3 years ago worked up negative for uterine cancer. Will plan for discharge and close GYN follow-up. Strict return precautions discussed. Diagnosis and Disposition       DISCHARGE NOTE:  Kerri Blandon  results have been reviewed with her. She has been counseled regarding her diagnosis, treatment, and plan. She verbally conveys understanding and agreement of the signs, symptoms, diagnosis, treatment and prognosis and additionally agrees to follow up as discussed. She also agrees with the care-plan and conveys that all of her questions have been answered. I have also provided discharge instructions for her that include: educational information regarding their diagnosis and treatment, and list of reasons why they would want to return to the ED prior to their follow-up appointment, should her condition change. She has been provided with education for proper emergency department utilization. CLINICAL IMPRESSION:    1. Post-menopausal bleeding    2. UTI (urinary tract infection), uncomplicated        PLAN:  1. D/C Home  2. Current Discharge Medication List      START taking these medications    Details   cephALEXin (KEFLEX) 500 mg capsule Take 1 Cap by mouth two (2) times a day for 7 days. Qty: 14 Cap, Refills: 0           3.    Follow-up Information     Follow up With Specialties Details Why Contact Info    your GYN  Schedule an appointment as soon as possible for a visit      Reagan Reyes MD Obstetrics & Gynecology, Gynecology, Obstetrics Schedule an appointment as soon as possible for a visit  [address] [facility]  [address]      Sonu Adams MD Family Practice Schedule an appointment as soon as possible for a visit  [address] [facility]      [facility] EMERGENCY DEPT Emergency Medicine  As needed, If symptoms worsen [address] [address]  [phone]                  Please note that this dictation was completed with [employer], the computer voice recognition software. Quite often unanticipated grammatical, syntax, homophones, and other interpretive errors are inadvertently transcribed by the computer software. Please disregard these errors. Please excuse any errors that have escaped final proofreading.

## 2020-03-03 NOTE — DISCHARGE INSTRUCTIONS
Urinary Tract Infection in Women: Care Instructions  Your Care Instructions    A urinary tract infection, or UTI, is a general term for an infection anywhere between the kidneys and the urethra (where urine comes out). Most UTIs are bladder infections. They often cause pain or burning when you urinate. UTIs are caused by bacteria and can be cured with antibiotics. Be sure to complete your treatment so that the infection goes away. Follow-up care is a key part of your treatment and safety. Be sure to make and go to all appointments, and call your doctor if you are having problems. It's also a good idea to know your test results and keep a list of the medicines you take. How can you care for yourself at home? · Take your antibiotics as directed. Do not stop taking them just because you feel better. You need to take the full course of antibiotics. · Drink extra water and other fluids for the next day or two. This may help wash out the bacteria that are causing the infection. (If you have kidney, heart, or liver disease and have to limit fluids, talk with your doctor before you increase your fluid intake.)  · Avoid drinks that are carbonated or have caffeine. They can irritate the bladder. · Urinate often. Try to empty your bladder each time. · To relieve pain, take a hot bath or lay a heating pad set on low over your lower belly or genital area. Never go to sleep with a heating pad in place. To prevent UTIs  · Drink plenty of water each day. This helps you urinate often, which clears bacteria from your system. (If you have kidney, heart, or liver disease and have to limit fluids, talk with your doctor before you increase your fluid intake.)  · Urinate when you need to. · Urinate right after you have sex. · Change sanitary pads often. · Avoid douches, bubble baths, feminine hygiene sprays, and other feminine hygiene products that have deodorants.   · After going to the bathroom, wipe from front to back.  When should you call for help? Call your doctor now or seek immediate medical care if:    · Symptoms such as fever, chills, nausea, or vomiting get worse or appear for the first time.     · You have new pain in your back just below your rib cage. This is called flank pain.     · There is new blood or pus in your urine.     · You have any problems with your antibiotic medicine.    Watch closely for changes in your health, and be sure to contact your doctor if:    · You are not getting better after taking an antibiotic for 2 days.     · Your symptoms go away but then come back. Where can you learn more? Go to http://gautam-edilberto.info/. Enter P742 in the search box to learn more about \"Urinary Tract Infection in Women: Care Instructions. \"  Current as of: December 19, 2018  Content Version: 12.2  © 7135-0739 [employer]. Care instructions adapted under license by [employer] (which disclaims liability or warranty for this information). If you have questions about a medical condition or this instruction, always ask your healthcare professional. [place] [address] any warranty or liability for your use of this information. Patient Education        Vaginal Bleeding After Menopause: Care Instructions  Your Care Instructions    Vaginal bleeding after menopause can have many causes. Causes may include infection, inflammation, prescription hormones, abnormal growths, and injury. Your doctor may want you to have more tests to find the cause of your vaginal bleeding. Follow-up care is a key part of your treatment and safety. Be sure to make and go to all appointments, and call your doctor if you are having problems. It's also a good idea to know your test results and keep a list of the medicines you take. How can you care for yourself at home? · If your doctor gave you medicine, take it exactly as prescribed.  Call your doctor if you think you are having a problem with your medicine. · Do not have sex or put anything inside your vagina until you talk with your doctor. · Do not douche. When should you call for help? Call 911 anytime you think you may need emergency care. For example, call if:    · You passed out (lost consciousness).    Call your doctor now or seek immediate medical care if:    · You have severe vaginal bleeding.     · You are dizzy or lightheaded, or you feel like you may faint.     · You have new or worse belly or pelvic pain.    Watch closely for changes in your health, and be sure to contact your doctor if:    · Your bleeding gets worse.     · You think you might be pregnant.     · You do not get better as expected. Where can you learn more? Go to http://gautam-edilberto.info/. Enter N304 in the search box to learn more about \"Vaginal Bleeding After Menopause: Care Instructions. \"  Current as of: February 19, 2019  Content Version: 12.2  © 9351-5521 [employer]. Care instructions adapted under license by [employer] (which disclaims liability or warranty for this information). If you have questions about a medical condition or this instruction, always ask your healthcare professional. [address] any warranty or liability for your use of this information.

## 2020-03-03 NOTE — ED TRIAGE NOTES
Patient reports she has been having vaginal bleeding \"for a few days\" and that it has gotten worse. States she is also having bilateral lower abdominal pain. Patient also endorsed right hand issues. States having pain and \"loosing control of it\". Patient also reports having neck pain for a few days.

## 2020-03-04 LAB
BACTERIA SPEC CULT: NORMAL
C TRACH RRNA SPEC QL NAA+PROBE: NEGATIVE
N GONORRHOEA RRNA SPEC QL NAA+PROBE: NEGATIVE
SERVICE CMNT-IMP: NORMAL
SPECIMEN SOURCE: NORMAL

## 2020-05-14 RX ORDER — FUROSEMIDE 40 MG/1
TABLET ORAL
Qty: 90 TAB | Refills: 3 | Status: SHIPPED | OUTPATIENT
Start: 2020-05-14 | End: 2021-02-26

## 2020-06-01 ENCOUNTER — VIRTUAL VISIT (OUTPATIENT)
Dept: HEMATOLOGY | Age: 65
End: 2020-06-01

## 2020-06-01 DIAGNOSIS — K75.4 AUTOIMMUNE HEPATITIS (HCC): Primary | ICD-10-CM

## 2020-06-01 NOTE — PROGRESS NOTES
[address] [place] [address], MD, MD Ofelia Zacarias PA-C Alexandria Mates, [facility]     April S Debbie, [facility]   ALIZA Campos-REGGIE Thompson, [facility]       Meg Pierre [facility] 136    at [facility]    [address] [facility], [address] [place] [address], [facility] 22.    [phone]    FAX: [address] [place] [address], [address]    [phone]    FAX: [phone]     Patient Care Team:  Saman Watts MD as PCP - General (Family Practice)  Luh Vizcarra MD (Gastroenterology)      Problem List  Date Reviewed: 11/21/2018          Codes Class Noted    Obesity, morbid ([facility] ICD-10-CM: E66.01  ICD-9-CM: 278.01  2/15/2018        Autoimmune hepatitis ([facility] ICD-10-CM: K75.4  ICD-9-CM: 571.42  5/30/2017        Diabetes mellitus, type 2 ([facility] ICD-10-CM: E11.9  ICD-9-CM: 250.00  5/30/2017        Edema ICD-10-CM: R60.9  ICD-9-CM: 782.3  5/30/2017        Hypertension ICD-10-CM: I10  ICD-9-CM: 401.9  5/30/2017        Hypercholesterolemia ICD-10-CM: E78.00  ICD-9-CM: 272.0  5/30/2017            VIRTUAL TELEHEALTH VISIT PERFORMED DUE TO COVID-19 EPIDEMIC    CONSENT:  David Mercer, who was seen by synchronous, real-time, audio-video technology, and/or her healthcare decision maker, is aware that this patient-initiated, Telehealth encounter on 6/1/2020 is a billable service, with coverage as determined by her insurance carrier. She is aware that she may receive a bill and has provided verbal consent to proceed. This patient was evaluated during a Virtual Telehealth visit. A caregiver was present if appropriate.  Due to this being a TeleHealth encounter performed during the [place] [address] public health emergency, the physical examination was limited to that listed in the [address] returns to the [place] [address] for management of autoimmune hepatitis. The active problem list, all pertinent past medical history, medications, and laboratory findings related to the liver disorder were reviewed with the patient. The patient is a 75 y.o. Black female who was first noted to have abnormalities in liver transaminases and alkaline phosphase in 3/2017. Further evaluation of these abnormalities has included serologic studies which were positive for ASMA    Ultrasound of the liver was performed in 3/2017. This demonstrated slightly increased echogenicity. A liver biopsy was performed in 3/2017. This demonstrated severe inflammation and portal fibrosis. The patient has no symptoms which can be attributed to the liver disorder. The patient has not experienced pain in the right side over the liver, yellowing of the eyes or skin, problems concentrating. The patient has moderate limitations in functional activities which can be attributed to other medical problems that are not related to the liver disease. ALLERGIES  Allergies   Allergen Reactions    Lisinopril Angioedema       MEDICATIONS  Current Outpatient Medications   Medication Sig    furosemide (LASIX) 40 mg tablet TAKE 1 TABLET EVERY DAY    spironolactone (ALDACTONE) 100 mg tablet TAKE 1 TABLET EVERY DAY    tacrolimus (PROGRAF) 1 mg capsule TAKE 1 CAPSULE EVERY 12 HOURS    aspirin delayed-release 81 mg tablet Take  by mouth daily.  amLODIPine (NORVASC) 2.5 mg tablet     LANTUS SOLOSTAR 100 unit/mL (3 mL) inpn 26 Units daily. No current facility-administered medications for this visit. SYSTEM REVIEW NOT RELATED TO LIVER DISEASE OR REVIEWED ABOVE:  Constitution systems: morbid obesity  Eyes: Negative for visual changes. ENT: Negative for sore throat, painful swallowing. Respiratory: Negative for cough, hemoptysis, SOB.    Cardiology: Negative for chest pain, palpitations. GI:  Negative for constipation or diarrhea. : Negative for urinary frequency, dysuria, hematuria, nocturia. Skin: Negative for rash. Hematology: Negative for easy bruising, blood clots. Musculo-skelatal: Arthritis of knees. Neurologic: Negative for headaches, dizziness, vertigo, memory problems not related to HE. Psychology: Negative for anxiety, depression. FAMILY HISTORY:  The father  of DM. The mother  of Sarcoidosis. There is no family history of liver disease. There is no family history of immune disorders. SOCIAL HISTORY:  The patient is . The patient has 2 children, and 7 grandchildren. The patient has never used tobacco products. The patient consumes alcohol on social occasions never in excess. The patient has been abstinent from alcohol since 3/2017. The patient used to work in [employer] for children. The patient has not worked since 2015. PHYSICAL EXAMINATION:  There were no vitals taken for this visit. General: No acute distress. Eyes: Sclera anicteric. ENT: No oral lesions. Nodes: No adenopathy. Skin: No spider angiomata. No jaundice. No palmar erythema. Respiratory: No wheezing, respiratory distress, cyanosis. Cardiovascular: No JVD. Abdomen: Appears soft with no obvious ascites. Extremities: No edema. No muscle wasting. No gross arthritic changes. Neurologic: Alert and oriented. Cranial nerves grossly intact. No asterixis.       LABORATORY STUDIES:  Liver [place] of [address] & Units 3/2/2020 2019   WBC 4.6 - 13.2 K/uL 9.2 6.1   ANC 1.8 - 8.0 K/UL 5.3 2.9   HGB 12.0 - 16.0 g/dL 13.3 12.1    - 420 K/uL 288 264   AST 10 - 38 U/L 10 8 (L)   ALT 13 - 56 U/L 15 11 (L)   Alk Phos 45 - 117 U/L 122 (H) 101   Bili, Total 0.2 - 1.0 MG/DL 0.5 0.5   Bili, Direct 0.0 - 0.2 MG/DL  0.1   Albumin 3.4 - 5.0 g/dL 3.7 3.5   BUN 7.0 - 18 MG/DL 15 10   Creat 0.6 - 1.3 MG/DL 0.87 0.68   Na 136 - 145 mmol/L 141 140   K 3.5 - 5.5 mmol/L 3.9 4.0   Cl 100 - 111 mmol/L 104 104   CO2 21 - 32 mmol/L 31 29   Glucose 74 - 99 mg/dL 141 (H) 105 (H)     TACROLIMUS LEVEL:  Liver Virology and Transplant Immune Suppression Latest Ref Rng & Units 11/21/2019   Tacrolimus Level 2.0 - 20.0 ng/mL None detected     Liver Virology and Transplant Immune Suppression Latest Ref Rng & Units 5/20/2019   Tacrolimus Level 2.0 - 20.0 ng/mL None detected     Liver Virology and Transplant Immune Suppression Latest Ref Rng & Units 7/26/2018   Tacrolimus Level 2.0 - 20.0 ng/mL None Detected       SEROLOGIES:  3/2017. HBsAntigen negative, anti-HCV negative, Ferritin 239, iron saturation 11%, KAYLEE negative, ASMA positive 1:320,     LIVER HISTOLOGY:  3/2017. Severe active hepatitis with PMN and portal fibrosis. Biopsy slides not reviewed by MLS.  5/2018. Shear wave elastography performed at [facility]. EkPa was E Range: 4.24-9.41 kPa, E Mean: 6.39 kPa, E Median: 6.10 kPa, E Std: 1.54 kPa. The results suggested a fibrosis level of F1.  11/2019. Shear wave elastography performed at [facility]. EkPa was E Range: 10.32 kPa, E Mean: 12.49 kPa, E Median: 13.02 kPa, E Std: 5.68 kPa. The results suggested a fibrosis level of F4. ENDOSCOPIC PROCEDURES:  Not available or performed    RADIOLOGY:  3/2017. Ultrasound of liver. Mild increased echogenicity. No liver mass lesions. No dilated bile ducts. No ascites. 5/2018. Ultrasound of the liver. Mild increased echogenicity. No liver mass lesions. No dilated bile ducts. No ascites. 6/2019. Ultrasound of the liver. Mild increased echogenicity. No liver mass lesions. No dilated bile ducts. No ascites. OTHER TESTING:  Not available or performed    ASSESSMENT AND PLAN:  Autoimmune Hepatitis with portal fibrosis    Liver transaminases are normal.  Alkaline phosphate is elevated.   Liver function is normal.  The platelet count is normal.      The patient is prescribed treatment with Tacrolimus 1 mg twice daily. Patient reports she is only taking Tacrolimus 1 mg daily. The patient was directed to continue all other medications at the current dosages. There are no contraindications for the patient to take any medications that are necessary for treatment of other medical issues. The need to assess liver fibrosis was discussed. Shear wave elastography can assess liver fibrosis and determine if a patient has advanced fibrosis or cirrhosis without the need for liver biopsy. This will be scheduled. The elastography can be repeated annually or as often as clinically indicated to assess for fibrosis progression and/or regression. The patient was counseled regarding alcohol consumption. Vaccination for viral hepatitis A and B is recommended since the patient has no serologic evidence of previous exposure or vaccination with immunity. Will perform laboratory testing to monitor liver function and degree of liver injury. This will include BMP, hepatic panel, CBC with platelet count. Will perform laboratory testing to monitor immune suppression medication. This will include tacrolimus level. FOLLOW-UP:  Pursuant to the emergency declaration under the [address] [place] [address], [address] [place] [address] and the [employer] Act, this Virtual  Visit was conducted, with the patient's (and/or their legal guardian's) consent, to reduce the patient's risk of exposure to COVID-19 and provide necessary medical care. Services were provided through a video synchronous discussion virtually to substitute for an in-person clinic visit. The patient was located in their home. The provider was located in the [place] [address] office.        Because of the COVID-19 epidemic all non-emergent diagnostic testing and the in-office visit will be delayed by several months to reduce the risk of patient exposure to and potential infection from the novel corona virus. This follow-up appointment may be delayed further if warranted by the status of the epidemic at that time. Orders to obtain laboratory testing will be mailed to the patient and obtained 1 week prior to the in-person appointment.     A follow-up appointment will be performed via in office or TeleHealth in 6 months      JULIA Frazier-BC  [address]  4 [facility], [address]  [phone]

## 2020-06-28 ENCOUNTER — HOSPITAL ENCOUNTER (EMERGENCY)
Age: 65
Discharge: HOME | End: 2020-06-29
Attending: EMERGENCY MEDICINE
Payer: MEDICARE

## 2020-06-28 DIAGNOSIS — E78.00 HYPERCHOLESTEROLEMIA: ICD-10-CM

## 2020-06-28 DIAGNOSIS — I10 ESSENTIAL HYPERTENSION: ICD-10-CM

## 2020-06-28 DIAGNOSIS — G47.62 NOCTURNAL LEG CRAMPS: Primary | ICD-10-CM

## 2020-06-28 PROCEDURE — 96375 TX/PRO/DX INJ NEW DRUG ADDON: CPT

## 2020-06-28 PROCEDURE — 96374 THER/PROPH/DIAG INJ IV PUSH: CPT

## 2020-06-28 PROCEDURE — 99284 EMERGENCY DEPT VISIT MOD MDM: CPT

## 2020-06-28 RX ORDER — TACROLIMUS 1 MG/1
CAPSULE ORAL
Qty: 180 CAP | Refills: 3 | Status: SHIPPED | OUTPATIENT
Start: 2020-06-28 | End: 2021-01-11

## 2020-06-29 ENCOUNTER — APPOINTMENT (OUTPATIENT)
Dept: VASCULAR SURGERY | Age: 65
End: 2020-06-29
Attending: PHYSICIAN ASSISTANT
Payer: MEDICARE

## 2020-06-29 ENCOUNTER — PATIENT OUTREACH (OUTPATIENT)
Dept: FAMILY MEDICINE CLINIC | Facility: CLINIC | Age: 65
End: 2020-06-29

## 2020-06-29 VITALS
BODY MASS INDEX: 51.91 KG/M2 | DIASTOLIC BLOOD PRESSURE: 47 MMHG | OXYGEN SATURATION: 91 % | WEIGHT: 293 LBS | HEIGHT: 63 IN | HEART RATE: 86 BPM | SYSTOLIC BLOOD PRESSURE: 166 MMHG | RESPIRATION RATE: 18 BRPM | TEMPERATURE: 98.7 F

## 2020-06-29 LAB
ANION GAP SERPL CALC-SCNC: 6 MMOL/L (ref 3–18)
APPEARANCE UR: ABNORMAL
APTT PPP: 25.1 SEC (ref 23–36.4)
BACTERIA URNS QL MICRO: ABNORMAL /HPF
BASOPHILS # BLD: 0 K/UL (ref 0–0.1)
BASOPHILS NFR BLD: 0 % (ref 0–2)
BILIRUB UR QL: NEGATIVE
BUN SERPL-MCNC: 22 MG/DL (ref 7–18)
BUN/CREAT SERPL: 24 (ref 12–20)
CALCIUM SERPL-MCNC: 8.7 MG/DL (ref 8.5–10.1)
CHLORIDE SERPL-SCNC: 102 MMOL/L (ref 100–111)
CK SERPL-CCNC: 51 U/L (ref 26–192)
CO2 SERPL-SCNC: 30 MMOL/L (ref 21–32)
COLOR UR: YELLOW
CREAT SERPL-MCNC: 0.91 MG/DL (ref 0.6–1.3)
DIFFERENTIAL METHOD BLD: ABNORMAL
EOSINOPHIL # BLD: 0.3 K/UL (ref 0–0.4)
EOSINOPHIL NFR BLD: 4 % (ref 0–5)
EPITH CASTS URNS QL MICRO: ABNORMAL /LPF (ref 0–5)
ERYTHROCYTE [DISTWIDTH] IN BLOOD BY AUTOMATED COUNT: 15.7 % (ref 11.6–14.5)
GLUCOSE SERPL-MCNC: 180 MG/DL (ref 74–99)
GLUCOSE UR STRIP.AUTO-MCNC: NEGATIVE MG/DL
HCT VFR BLD AUTO: 40.3 % (ref 35–45)
HGB BLD-MCNC: 12.6 G/DL (ref 12–16)
HGB UR QL STRIP: NEGATIVE
INR PPP: 1.1 (ref 0.8–1.2)
KETONES UR QL STRIP.AUTO: NEGATIVE MG/DL
LEUKOCYTE ESTERASE UR QL STRIP.AUTO: ABNORMAL
LYMPHOCYTES # BLD: 2.6 K/UL (ref 0.9–3.6)
LYMPHOCYTES NFR BLD: 31 % (ref 21–52)
MAGNESIUM SERPL-MCNC: 2.2 MG/DL (ref 1.6–2.6)
MCH RBC QN AUTO: 27.2 PG (ref 24–34)
MCHC RBC AUTO-ENTMCNC: 31.3 G/DL (ref 31–37)
MCV RBC AUTO: 86.9 FL (ref 74–97)
MONOCYTES # BLD: 0.8 K/UL (ref 0.05–1.2)
MONOCYTES NFR BLD: 10 % (ref 3–10)
NEUTS SEG # BLD: 4.5 K/UL (ref 1.8–8)
NEUTS SEG NFR BLD: 55 % (ref 40–73)
NITRITE UR QL STRIP.AUTO: NEGATIVE
PH UR STRIP: 5 [PH] (ref 5–8)
PLATELET # BLD AUTO: 310 K/UL (ref 135–420)
PMV BLD AUTO: 9.4 FL (ref 9.2–11.8)
POTASSIUM SERPL-SCNC: 3.7 MMOL/L (ref 3.5–5.5)
PROT UR STRIP-MCNC: NEGATIVE MG/DL
PROTHROMBIN TIME: 13.5 SEC (ref 11.5–15.2)
RBC # BLD AUTO: 4.64 M/UL (ref 4.2–5.3)
RBC #/AREA URNS HPF: ABNORMAL /HPF (ref 0–5)
SODIUM SERPL-SCNC: 138 MMOL/L (ref 136–145)
SP GR UR REFRACTOMETRY: 1.01 (ref 1–1.03)
TRICHOMONAS UR QL MICRO: ABNORMAL
UROBILINOGEN UR QL STRIP.AUTO: 1 EU/DL (ref 0.2–1)
WBC # BLD AUTO: 8.3 K/UL (ref 4.6–13.2)
WBC URNS QL MICRO: ABNORMAL /HPF (ref 0–5)

## 2020-06-29 PROCEDURE — 80048 BASIC METABOLIC PNL TOTAL CA: CPT

## 2020-06-29 PROCEDURE — 83735 ASSAY OF MAGNESIUM: CPT

## 2020-06-29 PROCEDURE — 81001 URINALYSIS AUTO W/SCOPE: CPT

## 2020-06-29 PROCEDURE — 85025 COMPLETE CBC W/AUTO DIFF WBC: CPT

## 2020-06-29 PROCEDURE — 74011250636 HC RX REV CODE- 250/636: Performed by: EMERGENCY MEDICINE

## 2020-06-29 PROCEDURE — 82550 ASSAY OF CK (CPK): CPT

## 2020-06-29 PROCEDURE — 85610 PROTHROMBIN TIME: CPT

## 2020-06-29 PROCEDURE — 85730 THROMBOPLASTIN TIME PARTIAL: CPT

## 2020-06-29 PROCEDURE — 93971 EXTREMITY STUDY: CPT

## 2020-06-29 RX ORDER — ONDANSETRON 2 MG/ML
4 INJECTION INTRAMUSCULAR; INTRAVENOUS
Status: COMPLETED | OUTPATIENT
Start: 2020-06-29 | End: 2020-06-29

## 2020-06-29 RX ORDER — METHOCARBAMOL 500 MG/1
1000 TABLET, FILM COATED ORAL 3 TIMES DAILY
Qty: 30 TAB | Refills: 0 | Status: SHIPPED | OUTPATIENT
Start: 2020-06-29 | End: 2020-06-29

## 2020-06-29 RX ORDER — METHOCARBAMOL 500 MG/1
1000 TABLET, FILM COATED ORAL 3 TIMES DAILY
Qty: 30 TAB | Refills: 0 | Status: SHIPPED | OUTPATIENT
Start: 2020-06-29

## 2020-06-29 RX ORDER — FENTANYL CITRATE 50 UG/ML
100 INJECTION, SOLUTION INTRAMUSCULAR; INTRAVENOUS ONCE
Status: COMPLETED | OUTPATIENT
Start: 2020-06-29 | End: 2020-06-29

## 2020-06-29 RX ADMIN — ONDANSETRON 4 MG: 2 INJECTION INTRAMUSCULAR; INTRAVENOUS at 00:53

## 2020-06-29 RX ADMIN — FENTANYL CITRATE 100 MCG: 50 INJECTION, SOLUTION INTRAMUSCULAR; INTRAVENOUS at 00:52

## 2020-06-29 NOTE — PROGRESS NOTES
Patient contacted regarding recent discharge and COVID-19 risk. Discussed COVID-19 related testing which was not done at this time. Test results were not done. Patient informed of results, if available? n/a    Care Coordinator contacted the patient by telephone to perform post discharge assessment. Verified name and  with patient as identifiers. Patient has following risk factors of: diabetes and htn. Care Coordinator reviewed discharge instructions, medical action plan and red flags related to discharge diagnosis. Reviewed and educated them on any new and changed medications related to discharge diagnosis. Advised obtaining a 90-day supply of all daily and as-needed medications. Education provided regarding infection prevention, and signs and symptoms of COVID-19 and when to seek medical attention with patient who verbalized understanding. Discussed exposure protocols and quarantine from [address] you at higher risk for severe illness  and given an opportunity for questions and concerns. The patient agrees to contact the COVID-19 hotline [phone] or PCP office for questions related to their healthcare. Care Coordinator provided contact information for future reference. From CDC: Are you at higher risk for severe illness?  Wash your hands often.  Avoid close contact (6 feet, which is about two arm lengths) with people who are sick.  Put distance between yourself and other people if COVID-19 is spreading in your community.  Clean and disinfect frequently touched surfaces.  Avoid all cruise travel and non-essential air travel.  Call your healthcare professional if you have concerns about COVID-19 and your underlying condition or if you are sick.     For more information on steps you can take to protect yourself, see CDC's How to Protect Yourself      Patient/family/caregiver given information for Fei Cruz and agrees to enroll no  Patient's preferred e-mail: n/a  Patient's preferred phone number: n/a  Based on Loop alert triggers, patient will be contacted by nurse care manager for worsening symptoms. Plan for follow-up call in 7-14 days based on severity of symptoms and risk factors.

## 2020-06-29 NOTE — DISCHARGE INSTRUCTIONS
Learning About High Cholesterol  What is high cholesterol? High cholesterol means that you have too much cholesterol in your blood. Cholesterol is a type of fat. It's needed for many body functions, such as making new cells. Cholesterol is made by your body. It also comes from food you eat. Having high cholesterol can lead to the buildup of plaque in artery walls. This can increase your risk of heart disease and stroke. When your doctor talks about high cholesterol levels, he or she is talking about your total cholesterol and LDL cholesterol (the \"bad\" cholesterol) levels. Your doctor may also speak about HDL (the \"good\" cholesterol) levels. High HDL is linked with a lower risk for heart disease, heart attack, and stroke. Your cholesterol levels help your doctor find out your risk for having a heart attack or stroke. How can you prevent high cholesterol? A heart-healthy lifestyle can help you prevent high cholesterol. This lifestyle helps lower your risk for a heart attack and stroke. · Eat heart-healthy foods. ? Eat fruits, vegetables, whole grains (like oatmeal), dried beans and peas, nuts and seeds, soy products (like tofu), and fat-free or low-fat dairy products. ? Replace butter, margarine, and hydrogenated or partially hydrogenated oils with olive and canola oils. (Canola oil margarine without trans fat is fine.)  ? Replace red meat with fish, poultry, and soy protein (like tofu). ? Limit processed and packaged foods like chips, crackers, and cookies. · Be active. Exercise can improve your cholesterol level. Get at least 30 minutes of exercise on most days of the week. Walking is a good choice. You also may want to do other activities, such as running, swimming, cycling, or playing tennis or team sports. · Stay at a healthy weight. Lose weight if you need to. · Don't smoke. If you need help quitting, talk to your doctor about stop-smoking programs and medicines.  These can increase your chances of quitting for good. How is high cholesterol treated? The goal of treatment is to reduce your chances of having a heart attack or stroke. The goal is not to lower your cholesterol numbers only. · You may make lifestyle changes, such as eating healthy foods, not smoking, losing weight, and being more active. · You may have to take medicine. Follow-up care is a key part of your treatment and safety. Be sure to make and go to all appointments, and call your doctor if you are having problems. It's also a good idea to know your test results and keep a list of the medicines you take. Where can you learn more? Go to http://gautam-edilberto.info/  Enter Q621 in the search box to learn more about \"Learning About High Cholesterol. \"  Current as of: December 16, 2019               Content Version: 12.5  © 8879-3501 Healthwise, Incorporated. Care instructions adapted under license by [employer] (which disclaims liability or warranty for this information). If you have questions about a medical condition or this instruction, always ask your healthcare professional. [address] any warranty or liability for your use of this information. Nighttime Leg Cramps: Care Instructions  Your Care Instructions     Nighttime leg cramps happen when a leg muscle tightens up suddenly. This most often happens in the calf. But cramps in the thigh or foot are also common. Cramps often occur just as you fall asleep or wake up. Leg cramps can be painful. They can last a few seconds to a few minutes. Though they are common, experts don't know exactly what causes them. To treat muscle cramps, you can stretch and massage the muscle. If cramps keep coming back, your doctor may prescribe medicine that relaxes your muscles. Follow-up care is a key part of your treatment and safety. Be sure to make and go to all appointments, and call your doctor if you are having problems. It's also a good idea to know your test results and keep a list of the medicines you take. How can you care for yourself at home? · To stop a leg cramp, sit down and straighten your leg as you bend your foot up toward your knee. It may help to place a rolled towel under the ball of your foot and, while you hold the towel at both ends, gently pull the towel toward you while you keep your knee straight. This stretches the calf muscles. The cramp usually goes away after a few minutes. · Take a warm shower or bath to relax the muscle. Some people find that a heating pad placed on the muscle can also help. Others get relief by rubbing the calf with an ice pack. · Stretch your muscles every day, especially before and after exercise and at bedtime. Regular stretching can relax your muscles and may prevent cramps. · Do not suddenly increase the amount of exercise you get. Increase your exercise a little each week. · If your doctor prescribes medicine, take it exactly as prescribed. Call your doctor if you think you are having a problem with your medicine. · Ask your doctor if you can take an over-the-counter pain medicine, such as acetaminophen (Tylenol), ibuprofen (Advil, Motrin), or naproxen (Aleve). Be safe with medicines. Read and follow all instructions on the label. · Drink plenty of fluids. If you have kidney, heart, or liver disease and have to limit fluids, talk with your doctor before you increase the amount of fluids you drink. When should you call for help? Watch closely for changes in your health, and be sure to contact your doctor if:  · You often have muscle cramps that do not go away after home treatment. · Your muscle cramps often wake you up at night. · You do not get better as expected. Where can you learn more? Go to http://gautam-edilberto.info/  Enter S910 in the search box to learn more about \"Nighttime Leg Cramps: Care Instructions. \"  Current as of: November 20, 3645               [employer] Version: 12.5  © 1311-8408 [employer]. Care instructions adapted under license by [employer] (which disclaims liability or warranty for this information). If you have questions about a medical condition or this instruction, always ask your healthcare professional. [address] any warranty or liability for your use of this information. High Blood Pressure: Care Instructions  Overview     It's normal for blood pressure to go up and down throughout the day. But if it stays up, you have high blood pressure. Another name for high blood pressure is hypertension. Despite what a lot of people think, high blood pressure usually doesn't cause headaches or make you feel dizzy or lightheaded. It usually has no symptoms. But it does increase your risk of stroke, heart attack, and other problems. You and your doctor will talk about your risks of these problems based on your blood pressure. Your doctor will give you a goal for your blood pressure. Your goal will be based on your health and your age. Lifestyle changes, such as eating healthy and being active, are always important to help lower blood pressure. You might also take medicine to reach your blood pressure goal.  Follow-up care is a key part of your treatment and safety. Be sure to make and go to all appointments, and call your doctor if you are having problems. It's also a good idea to know your test results and keep a list of the medicines you take. How can you care for yourself at home? Medical treatment  · If you stop taking your medicine, your blood pressure will go back up. You may take one or more types of medicine to lower your blood pressure. Be safe with medicines. Take your medicine exactly as prescribed. Call your doctor if you think you are having a problem with your medicine. · Talk to your doctor before you start taking aspirin every day.  Aspirin can help certain people lower their risk of a heart attack or stroke. But taking aspirin isn't right for everyone, because it can cause serious bleeding. · See your doctor regularly. You may need to see the doctor more often at first or until your blood pressure comes down. · If you are taking blood pressure medicine, talk to your doctor before you take decongestants or anti-inflammatory medicine, such as ibuprofen. Some of these medicines can raise blood pressure. · Learn how to check your blood pressure at home. Lifestyle changes  · Stay at a healthy weight. This is especially important if you put on weight around the waist. Losing even 10 pounds can help you lower your blood pressure. · If your doctor recommends it, get more exercise. Walking is a good choice. Bit by bit, increase the amount you walk every day. Try for at least 30 minutes on most days of the week. You also may want to swim, bike, or do other activities. · Avoid or limit alcohol. Talk to your doctor about whether you can drink any alcohol. · Try to limit how much sodium you eat to less than 2,300 milligrams (mg) a day. Your doctor may ask you to try to eat less than 1,500 mg a day. · Eat plenty of fruits (such as bananas and oranges), vegetables, legumes, whole grains, and low-fat dairy products. · Lower the amount of saturated fat in your diet. Saturated fat is found in animal products such as milk, cheese, and meat. Limiting these foods may help you lose weight and also lower your risk for heart disease. · Do not smoke. Smoking increases your risk for heart attack and stroke. If you need help quitting, talk to your doctor about stop-smoking programs and medicines. These can increase your chances of quitting for good. When should you call for help? Call  911 anytime you think you may need emergency care. This may mean having symptoms that suggest that your blood pressure is causing a serious heart or blood vessel problem.  Your blood pressure may be over 180/120. For example, call 911 if:  · You have symptoms of a heart attack. These may include:  ? Chest pain or pressure, or a strange feeling in the chest.  ? Sweating. ? Shortness of breath. ? Nausea or vomiting. ? Pain, pressure, or a strange feeling in the back, neck, jaw, or upper belly or in one or both shoulders or arms. ? Lightheadedness or sudden weakness. ? A fast or irregular heartbeat. · You have symptoms of a stroke. These may include:  ? Sudden numbness, tingling, weakness, or loss of movement in your face, arm, or leg, especially on only one side of your body. ? Sudden vision changes. ? Sudden trouble speaking. ? Sudden confusion or trouble understanding simple statements. ? Sudden problems with walking or balance. ? A sudden, severe headache that is different from past headaches. · You have severe back or belly pain. Do not wait until your blood pressure comes down on its own. Get help right away. Call your doctor now or seek immediate care if:  · Your blood pressure is much higher than normal (such as 180/120 or higher), but you don't have symptoms. · You think high blood pressure is causing symptoms, such as:  ? Severe headache.  ? Blurry vision. Watch closely for changes in your health, and be sure to contact your doctor if:  · Your blood pressure measures higher than your doctor recommends at least 2 times. That means the top number is higher or the bottom number is higher, or both. · You think you may be having side effects from your blood pressure medicine. Where can you learn more? Go to http://www.gray.com/  Enter [id number] in the search box to learn more about \"High Blood Pressure: Care Instructions. \"  Current as of: December 16, 2019               Content Version: 12.5  © 4807-6283 Healthwise, Incorporated. Care instructions adapted under license by [employer] (which disclaims liability or warranty for this information).  If you have questions about a medical condition or this instruction, always ask your healthcare professional. [place] [address] any warranty or liability for your use of this information.

## 2020-06-29 NOTE — ED TRIAGE NOTES
Patient came in from home c/o R hand and left leg pain. Patient states the hand pain started this morning and leg pain aprox 2130. Patient states it started after taking new medication for pain. Patient denies any cough, chest pain, SOB, no fever.

## 2020-06-29 NOTE — ED PROVIDER NOTES
EMERGENCY DEPARTMENT HISTORY AND PHYSICAL EXAM    Date: 6/28/2020  Patient Name: Jaron Oswald    History of Presenting Illness     Chief Complaint   Patient presents with    Leg Pain    Hand Pain         History Provided By: Patient    11:44 PM  Jaron Oswald is a 75 y.o. female with PMHX of hypertension, diabetes, liver disease who presents to the emergency department C/O right hand pain and left leg pain and swelling onset today. Patient reports history of carpal tunnel in her right hand intermittently cramps and has pain but it was more persistent today. She also reports pain to her left lateral thigh as well as increased swelling today. Denies injury or trauma. Patient states she was recently advised to take Lasix due to increased fluid on her lungs by PCP. Pt denies chest pain, shortness of breath, fever, abdominal pain, and any other sxs or complaints. PCP: Irena Oliver MD    Current Outpatient Medications   Medication Sig Dispense Refill    methocarbamoL (Robaxin) 500 mg tablet Take 2 Tabs by mouth three (3) times daily. 30 Tab 0    tacrolimus (PROGRAF) 1 mg capsule TAKE 1 CAPSULE EVERY 12 HOURS 180 Cap 3    spironolactone (ALDACTONE) 100 mg tablet TAKE 1 TABLET EVERY DAY 90 Tab 3    aspirin delayed-release 81 mg tablet Take  by mouth daily.  amLODIPine (NORVASC) 2.5 mg tablet       LANTUS SOLOSTAR 100 unit/mL (3 mL) inpn 26 Units daily.  furosemide (LASIX) 40 mg tablet TAKE 1 TABLET EVERY DAY 90 Tab 3       Past History     Past Medical History:  Past Medical History:   Diagnosis Date    Diabetes ([address]     Hypertension     Liver disease        Past Surgical History:  History reviewed. No pertinent surgical history. Family History:  History reviewed. No pertinent family history.     Social History:  Social History     Tobacco Use    Smoking status: Never Smoker    Smokeless tobacco: Never Used   Substance Use Topics    Alcohol use: Yes     Comment: 2 Drinks every 2 months    Drug use: No       Allergies: Allergies   Allergen Reactions    Lisinopril Angioedema         Review of Systems   Review of Systems   Constitutional: Negative for fever. Respiratory: Negative for shortness of breath. Cardiovascular: Positive for leg swelling. Negative for chest pain. Musculoskeletal: Positive for arthralgias and myalgias. All other systems reviewed and are negative. Physical Exam     Vitals:    06/28/20 2300 06/28/20 2330 06/29/20 0000 06/29/20 0130   BP: 190/48 194/46 (!) 203/51 166/47   Pulse:       Resp:       Temp:       SpO2: 99% 99%  91%   Weight:       Height:         Physical Exam  Vital signs and nursing notes reviewed. CONSTITUTIONAL: Alert. Well-appearing; morbidly obese; in no apparent distress. HEAD: Normocephalic; atraumatic. EYES: Conjunctiva clear. CV: Normal S1, S2; no murmurs, rubs, or gallops. No chest wall tenderness. RESPIRATORY: Normal chest excursion with respiration; breath sounds clear and equal bilaterally; no wheezes, rhonchi, or rales. EXT: Normal ROM in all four extremities; LLE: Swollen and tender to lateral thigh and calf; distal sensation intact. No skin changes. 2+ DP pulse. RUE: Complains of mild generalized pain, no focal areas of tenderness or deformity. Sensation intact. 2+ radial pulse.  5 out of 5 but exacerbates pain. Wrist and forearm nontender/atraumatic  SKIN: Normal for age and race; warm; dry; good turgor; no apparent lesions or exudate. NEURO: A & O x3. PSYCH:  Mood and affect appropriate. Diagnostic Study Results     Labs -     No results found for this or any previous visit (from the past 12 hour(s)).     Radiologic Studies -  No orders to display     CT Results  (Last 48 hours)    None        CXR Results  (Last 48 hours)    None          Medications given in the ED-  Medications   fentaNYL citrate (PF) injection 100 mcg (100 mcg IntraVENous Given 6/29/20 0052)   ondansetron (ZOFRAN) injection 4 mg (4 mg IntraVENous Given 6/29/20 0053)         Medical Decision Making   I am the first provider for this patient. I reviewed the vital signs, available nursing notes, past medical history, past surgical history, family history and social history. Vital Signs-Reviewed the patient's vital signs. Records Reviewed: Nursing Notes      Procedures:  Procedures    ED Course:  11:44 PM   Initial assessment performed. The patients presenting problems have been discussed, and they are in agreement with the care plan formulated and outlined with them. I have encouraged them to ask questions as they arise throughout their visit. 12:35am  Care will be transferred to ED attending Dr. Aditya Macdonald to await left lower extremity doppler and labs and final disposition. Diagnosis and Disposition       DISCHARGE NOTE:  2:55 AM  Bertha Valverde  results have been reviewed with her. She has been counseled regarding her diagnosis, treatment, and plan. She verbally conveys understanding and agreement of the signs, symptoms, diagnosis, treatment and prognosis and additionally agrees to follow up as discussed. She also agrees with the care-plan and conveys that all of her questions have been answered. I have also provided discharge instructions for her that include: educational information regarding their diagnosis and treatment, and list of reasons why they would want to return to the ED prior to their follow-up appointment, should her condition change. She has been provided with education for proper emergency department utilization. CLINICAL IMPRESSION:    1. Nocturnal leg cramps    2. Essential hypertension    3. Hypercholesterolemia        PLAN:  1. D/C Home  2. Discharge Medication List as of 6/29/2020  2:57 AM      START taking these medications    Details   methocarbamoL (Robaxin) 500 mg tablet Take 2 Tabs by mouth three (3) times daily. , Normal, Disp-30 Tab, R-0         CONTINUE these medications which have NOT CHANGED    Details   tacrolimus (PROGRAF) 1 mg capsule TAKE 1 CAPSULE EVERY 12 HOURS, Normal, Disp-180 Cap, R-3      spironolactone (ALDACTONE) 100 mg tablet TAKE 1 TABLET EVERY DAY, Normal, Disp-90 Tab, R-3      aspirin delayed-release 81 mg tablet Take  by mouth daily. , Historical Med      amLODIPine (NORVASC) 2.5 mg tablet Historical Med      LANTUS SOLOSTAR 100 unit/mL (3 mL) inpn 26 Units daily. , Historical Med, [place]      furosemide (LASIX) 40 mg tablet TAKE 1 TABLET EVERY DAY, Normal, Disp-90 Tab, R-3           3. Follow-up Information     Follow up With Specialties Details Why Contact Info    Lucio Elizabeth MD Family Practice Schedule an appointment as soon as possible for a visit in 1 week For primary care follow up [address] [place] [address]      [facility] EMERGENCY DEPT Emergency Medicine Go to As needed, if symptoms worsen [address] [place] [address]  [phone]        _______________________________      Please note that this dictation was completed with [employer], the computer voice recognition software. Quite often unanticipated grammatical, syntax, homophones, and other interpretive errors are inadvertently transcribed by the computer software. Please disregard these errors. Please excuse any errors that have escaped final proofreading.    ___________________________________    Attestations: This note is prepared by Christiano Dawn, acting as Scribe for Kendra Espinal. Enoc Quintanilla MD.    Kendra Espinal. Enoc Quintanilla MD:  The scribe's documentation has been prepared under my direction and personally reviewed by me in its entirety. I confirm that the note above accurately reflects all work, treatment, procedures, and medical decision making performed by me.

## 2020-06-30 ENCOUNTER — HOSPITAL ENCOUNTER (OUTPATIENT)
Dept: ULTRASOUND IMAGING | Age: 65
Discharge: HOME | End: 2020-06-30
Attending: NURSE PRACTITIONER
Payer: MEDICARE

## 2020-06-30 DIAGNOSIS — K75.4 AUTOIMMUNE HEPATITIS (HCC): ICD-10-CM

## 2020-06-30 PROCEDURE — 76981 USE PARENCHYMA: CPT

## 2020-07-01 ENCOUNTER — TELEPHONE (OUTPATIENT)
Dept: HEMATOLOGY | Age: 65
End: 2020-07-01

## 2020-07-01 NOTE — PROGRESS NOTES
Please call patient and let them know that their recent ultrasound is stable with no concerning lesions/masses within the liver. Elastography correlates with cirrhosis. I will see them at their next follow up appointment.

## 2020-07-01 NOTE — TELEPHONE ENCOUNTER
Called and verified patient with 2 identifiers. Informed patient that their recent ultrasound is stable with no concerning lesions/masses within the liver. Elastography correlates with cirrhosis. I will see them at their next follow up appointment.

## 2020-07-13 ENCOUNTER — PATIENT OUTREACH (OUTPATIENT)
Dept: CASE MANAGEMENT | Age: 65
End: 2020-07-13

## 2020-12-01 ENCOUNTER — DOCUMENTATION ONLY (OUTPATIENT)
Dept: HEMATOLOGY | Age: 65
End: 2020-12-01

## 2020-12-10 RX ORDER — SPIRONOLACTONE 100 MG/1
TABLET, FILM COATED ORAL
Qty: 90 TAB | Refills: 3 | Status: SHIPPED | OUTPATIENT
Start: 2020-12-10

## 2021-01-05 ENCOUNTER — OFFICE VISIT (OUTPATIENT)
Dept: HEMATOLOGY | Age: 66
End: 2021-01-05
Payer: MEDICARE

## 2021-01-05 VITALS
DIASTOLIC BLOOD PRESSURE: 86 MMHG | BODY MASS INDEX: 51.91 KG/M2 | SYSTOLIC BLOOD PRESSURE: 140 MMHG | TEMPERATURE: 96.9 F | OXYGEN SATURATION: 97 % | HEIGHT: 63 IN | HEART RATE: 89 BPM | RESPIRATION RATE: 19 BRPM | WEIGHT: 293 LBS

## 2021-01-05 DIAGNOSIS — K75.4 AUTOIMMUNE HEPATITIS (HCC): Primary | ICD-10-CM

## 2021-01-05 PROCEDURE — 1090F PRES/ABSN URINE INCON ASSESS: CPT | Performed by: NURSE PRACTITIONER

## 2021-01-05 PROCEDURE — G8427 DOCREV CUR MEDS BY ELIG CLIN: HCPCS | Performed by: NURSE PRACTITIONER

## 2021-01-05 PROCEDURE — G8536 NO DOC ELDER MAL SCRN: HCPCS | Performed by: NURSE PRACTITIONER

## 2021-01-05 PROCEDURE — G8417 CALC BMI ABV UP PARAM F/U: HCPCS | Performed by: NURSE PRACTITIONER

## 2021-01-05 PROCEDURE — G8400 PT W/DXA NO RESULTS DOC: HCPCS | Performed by: NURSE PRACTITIONER

## 2021-01-05 PROCEDURE — G8754 DIAS BP LESS 90: HCPCS | Performed by: NURSE PRACTITIONER

## 2021-01-05 PROCEDURE — 99213 OFFICE O/P EST LOW 20 MIN: CPT | Performed by: NURSE PRACTITIONER

## 2021-01-05 PROCEDURE — G8432 DEP SCR NOT DOC, RNG: HCPCS | Performed by: NURSE PRACTITIONER

## 2021-01-05 PROCEDURE — G8753 SYS BP > OR = 140: HCPCS | Performed by: NURSE PRACTITIONER

## 2021-01-05 PROCEDURE — 3017F COLORECTAL CA SCREEN DOC REV: CPT | Performed by: NURSE PRACTITIONER

## 2021-01-05 PROCEDURE — 1101F PT FALLS ASSESS-DOCD LE1/YR: CPT | Performed by: NURSE PRACTITIONER

## 2021-01-05 NOTE — PROGRESS NOTES
[address] [facility]      Pollo Schafer MD, Allison Issa, Mateus Serrano MD, MPH      Na Lebron, PA-REGGIE Schmitz, ACNP-BC     Marlene Lewis, PCNP-BC   Jayce Bobbi, FNP-REGGIE Ramirez, [facility]       [facility] 136    at [address], [address] [place] [address], [facility] 22.    [phone]    FAX: [address] [place] [address] Drive, [address], [address]    [phone]    FAX: [phone]       Patient Care Team:  Ninetta Schirmer, MD as PCP - General (Family Medicine)  Wilberto Spears MD (Gastroenterology)      Problem List  Date Reviewed: 11/21/2018          Codes Class Noted    Obesity, morbid [facility]) ICD-10-CM: E66.01  ICD-9-CM: 278.01  2/15/2018        Autoimmune hepatitis ([facility].) ICD-10-CM: K75.4  ICD-9-CM: 571.42  5/30/2017        Diabetes mellitus, type 2 ([facility] ICD-10-CM: E11.9  ICD-9-CM: 250.00  5/30/2017        Edema ICD-10-CM: R60.9  ICD-9-CM: 782.3  5/30/2017        Hypertension ICD-10-CM: I10  ICD-9-CM: 401.9  5/30/2017        Hypercholesterolemia ICD-10-CM: E78.00  ICD-9-CM: 272.0  5/30/2017              Yasmine Tong returns to the [address] for management of autoimmune hepatitis. The active problem list, all pertinent past medical history, medications, and laboratory findings related to the liver disorder were reviewed with the patient. The patient is a 77 y.o. Black female who was first noted to have abnormalities in liver transaminases and alkaline phosphase in 3/2017. Further evaluation of these abnormalities has included serologic studies which were positive for ASMA    Ultrasound of the liver was performed in 3/2017. This demonstrated slightly increased echogenicity.      A liver biopsy was performed in 3/2017. This demonstrated severe inflammation and portal fibrosis. The patient has no symptoms which can be attributed to the liver disorder. The patient has not experienced pain in the right side over the liver, yellowing of the eyes or skin, problems concentrating. The patient has moderate limitations in functional activities which can be attributed to other medical problems that are not related to the liver disease. ALLERGIES  Allergies   Allergen Reactions    Lisinopril Angioedema       MEDICATIONS  Current Outpatient Medications   Medication Sig    spironolactone (ALDACTONE) 100 mg tablet TAKE 1 TABLET EVERY DAY    methocarbamoL (Robaxin) 500 mg tablet Take 2 Tabs by mouth three (3) times daily.  tacrolimus (PROGRAF) 1 mg capsule TAKE 1 CAPSULE EVERY 12 HOURS    furosemide (LASIX) 40 mg tablet TAKE 1 TABLET EVERY DAY    aspirin delayed-release 81 mg tablet Take  by mouth daily.  amLODIPine (NORVASC) 2.5 mg tablet     LANTUS SOLOSTAR 100 unit/mL (3 mL) inpn 26 Units daily. No current facility-administered medications for this visit. SYSTEM REVIEW NOT RELATED TO LIVER DISEASE OR REVIEWED ABOVE:  Constitution systems: morbid obesity  Eyes: Negative for visual changes. ENT: Negative for sore throat, painful swallowing. Respiratory: Negative for cough, hemoptysis, SOB. Cardiology: Negative for chest pain, palpitations. GI:  Negative for constipation or diarrhea. : Negative for urinary frequency, dysuria, hematuria, nocturia. Skin: Negative for rash. Hematology: Negative for easy bruising, blood clots. Musculo-skelatal: Arthritis of knees. Neurologic: Negative for headaches, dizziness, vertigo, memory problems not related to HE. Psychology: Negative for anxiety, depression. FAMILY HISTORY:  The father  of DM. The mother  of Sarcoidosis. There is no family history of liver disease. There is no family history of immune disorders.     SOCIAL HISTORY:  The patient is . The patient has 2 children, and 7 grandchildren. The patient has never used tobacco products. The patient consumes alcohol on social occasions never in excess. The patient has been abstinent from alcohol since 3/2017. The patient used to work in [email or web address] for children. The patient has not worked since 7/2015. PHYSICAL EXAM:    Visit Vitals  BP (!) 140/86 (BP 1 Location: Right arm, BP Patient Position: Sitting)   Pulse 89   Temp 96.9 °F (36.1 °C)   Resp 19   Ht 5' 3\" (1.6 m)   Wt 322 lb (146.1 kg)   SpO2 97%   BMI 57.04 kg/m²       General: No acute distress. Eyes: Sclera anicteric. ENT: No oral lesions. Thyroid normal.  Nodes: No adenopathy. Skin: No spider angiomata. No jaundice. No palmar erythema. Respiratory: Lungs clear to auscultation. Cardiovascular: Regular heart rate. No murmurs. No JVD. Abdomen: Soft non-tender. Liver size normal to percussion/palpation. Spleen not palpable. No obvious ascites. Extremities: No edema. No muscle wasting. No gross arthritic changes. Neurologic: Alert and oriented. Cranial nerves grossly intact. No asterixis.       LABORATORY STUDIES:  L[facility] [place] of [address] [place] [address] & Units 3/2/2020 11/21/2019   WBC 4.6 - 13.2 K/uL 9.2 6.1   ANC 1.8 - 8.0 K/UL 5.3 2.9   HGB 12.0 - 16.0 g/dL 13.3 12.1    - 420 K/uL 288 264   AST 10 - 38 U/L 10 8 (L)   ALT 13 - 56 U/L 15 11 (L)   Alk Phos 45 - 117 U/L 122 (H) 101   Bili, Total 0.2 - 1.0 MG/DL 0.5 0.5   Bili, Direct 0.0 - 0.2 MG/DL  0.1   Albumin 3.4 - 5.0 g/dL 3.7 3.5   BUN 7.0 - 18 MG/DL 15 10   Creat 0.6 - 1.3 MG/DL 0.87 0.68   Na 136 - 145 mmol/L 141 140   K 3.5 - 5.5 mmol/L 3.9 4.0   Cl 100 - 111 mmol/L 104 104   CO2 21 - 32 mmol/L 31 29   Glucose 74 - 99 mg/dL 141 (H) 105 (H)     TACROLIMUS LEVEL:  Liver Virology and Transplant Immune Suppression Latest Ref Rng & Units 11/21/2019   Tacrolimus Level 2.0 - 20.0 ng/mL None detected     Liver Virology and Transplant Immune Suppression Latest Ref Rng & Units 5/20/2019   Tacrolimus Level 2.0 - 20.0 ng/mL None detected     Liver Virology and Transplant Immune Suppression Latest Ref Rng & Units 7/26/2018   Tacrolimus Level 2.0 - 20.0 ng/mL None Detected       SEROLOGIES:  3/2017. HBsAntigen negative, anti-HCV negative, Ferritin 239, iron saturation 11%, KAYLEE negative, ASMA positive 1:320,     LIVER HISTOLOGY:  3/2017. Severe active hepatitis with PMN and portal fibrosis. Biopsy slides not reviewed by MLS.  5/2018. Shear wave elastography performed at THE [facility]. EkPa was E Range: 4.24-9.41 kPa, E Mean: 6.39 kPa, E Median: 6.10 kPa, E Std: 1.54 kPa. The results suggested a fibrosis level of F1.  11/2019. Shear wave elastography performed at [facility]. EkPa was E Range: 10.32 kPa, E Mean: 12.49 kPa, E Median: 13.02 kPa, E Std: 5.68 kPa. The results suggested a fibrosis level of F4. The high IQR% suggests that the measurement may not be reliable. 6/2020. Shear wave elastography performed at [facility]. EkPa was E Range: 12.9-19.96 kPa, E Mean: 15.98 kPa, E Median: 15.25 kPa, E Std: 2.66 kPa. The results suggested a fibrosis level of F4. The high IQR% suggests that the measurement may not be reliable. ENDOSCOPIC PROCEDURES:  Not available or performed    RADIOLOGY:  3/2017. Ultrasound of liver. Mild increased echogenicity. No liver mass lesions. No dilated bile ducts. No ascites. 5/2018. Ultrasound of the liver. Mild increased echogenicity. No liver mass lesions. No dilated bile ducts. No ascites. 6/2019. Ultrasound of the liver. Mild increased echogenicity. No liver mass lesions. No dilated bile ducts. No ascites. 6/2020. Ultrasound of liver. Echogenic consistent with chronic liver disease. No liver mass lesions. No dilated bile ducts. No ascites.       OTHER TESTING:  Not available or performed    ASSESSMENT AND PLAN:  Autoimmune Hepatitis with portal fibrosis    Liver transaminases are normal.  Alkaline phosphate is elevated. Liver function is normal.  The platelet count is normal.      The patient was prescribed treatment with Tacrolimus 1 mg twice daily. She states she is no longer taking the medication and had not been taking it as prescribed. Discussed we will continue to monitor labs to monitor for possible flair of AIH. The patient was directed to continue all other medications at the current dosages. There are no contraindications for the patient to take any medications that are necessary for treatment of other medical issues. The need to assess liver fibrosis was discussed. Shear wave elastography can assess liver fibrosis and determine if a patient has advanced fibrosis or cirrhosis without the need for liver biopsy. This will be scheduled. The elastography can be repeated annually or as often as clinically indicated to assess for fibrosis progression and/or regression. The patient was counseled regarding alcohol consumption. Vaccination for viral hepatitis A and B is recommended since the patient has no serologic evidence of previous exposure or vaccination with immunity. Will perform laboratory testing to monitor liver function and degree of liver injury. This will include BMP, hepatic panel, CBC with platelet count. FOLLOW-UP:  All of the issues listed above in the Assessment and Plan were discussed with the patient. All questions were answered. The patient expressed a clear understanding of the above. [address] [place] [address] in 3 months for routine monitoring.       Michelle Almodovar AGPCNP-BC  [address] [facility] [place] [address], [facility] [address]  [address], [address]  [phone]

## 2021-02-26 RX ORDER — FUROSEMIDE 40 MG/1
TABLET ORAL
Qty: 90 TAB | Refills: 3 | Status: SHIPPED | OUTPATIENT
Start: 2021-02-26

## 2021-03-24 ENCOUNTER — APPOINTMENT (OUTPATIENT)
Dept: GENERAL RADIOLOGY | Age: 66
End: 2021-03-24
Attending: PHYSICIAN ASSISTANT
Payer: MEDICARE

## 2021-03-24 ENCOUNTER — HOSPITAL ENCOUNTER (EMERGENCY)
Age: 66
Discharge: HOME | End: 2021-03-24
Attending: EMERGENCY MEDICINE
Payer: MEDICARE

## 2021-03-24 VITALS
TEMPERATURE: 99.1 F | WEIGHT: 293 LBS | BODY MASS INDEX: 51.91 KG/M2 | HEIGHT: 63 IN | RESPIRATION RATE: 20 BRPM | OXYGEN SATURATION: 100 % | SYSTOLIC BLOOD PRESSURE: 168 MMHG | DIASTOLIC BLOOD PRESSURE: 62 MMHG | HEART RATE: 84 BPM

## 2021-03-24 DIAGNOSIS — R31.9 URINARY TRACT INFECTION WITH HEMATURIA, SITE UNSPECIFIED: Primary | ICD-10-CM

## 2021-03-24 DIAGNOSIS — N39.0 URINARY TRACT INFECTION WITH HEMATURIA, SITE UNSPECIFIED: Primary | ICD-10-CM

## 2021-03-24 LAB
ALBUMIN SERPL-MCNC: 3.2 G/DL (ref 3.4–5)
ALBUMIN/GLOB SERPL: 0.7 {RATIO} (ref 0.8–1.7)
ALP SERPL-CCNC: 89 U/L (ref 45–117)
ALT SERPL-CCNC: 13 U/L (ref 13–56)
ANION GAP SERPL CALC-SCNC: 4 MMOL/L (ref 3–18)
APPEARANCE UR: ABNORMAL
APTT PPP: 27.2 SEC (ref 23–36.4)
AST SERPL-CCNC: 13 U/L (ref 10–38)
BACTERIA URNS QL MICRO: ABNORMAL /HPF
BASOPHILS # BLD: 0 K/UL (ref 0–0.1)
BASOPHILS NFR BLD: 0 % (ref 0–2)
BILIRUB SERPL-MCNC: 0.3 MG/DL (ref 0.2–1)
BILIRUB UR QL: NEGATIVE
BUN SERPL-MCNC: 9 MG/DL (ref 7–18)
BUN/CREAT SERPL: 13 (ref 12–20)
CALCIUM SERPL-MCNC: 8.6 MG/DL (ref 8.5–10.1)
CHLORIDE SERPL-SCNC: 106 MMOL/L (ref 100–111)
CO2 SERPL-SCNC: 29 MMOL/L (ref 21–32)
COLOR UR: YELLOW
CREAT SERPL-MCNC: 0.69 MG/DL (ref 0.6–1.3)
DIFFERENTIAL METHOD BLD: ABNORMAL
EOSINOPHIL # BLD: 0.2 K/UL (ref 0–0.4)
EOSINOPHIL NFR BLD: 3 % (ref 0–5)
EPITH CASTS URNS QL MICRO: ABNORMAL /LPF (ref 0–5)
ERYTHROCYTE [DISTWIDTH] IN BLOOD BY AUTOMATED COUNT: 16.2 % (ref 11.6–14.5)
GLOBULIN SER CALC-MCNC: 4.4 G/DL (ref 2–4)
GLUCOSE SERPL-MCNC: 104 MG/DL (ref 74–99)
GLUCOSE UR STRIP.AUTO-MCNC: NEGATIVE MG/DL
HCT VFR BLD AUTO: 39.7 % (ref 35–45)
HGB BLD-MCNC: 12.5 G/DL (ref 12–16)
HGB UR QL STRIP: ABNORMAL
INR PPP: 1 (ref 0.8–1.2)
KETONES UR QL STRIP.AUTO: NEGATIVE MG/DL
LEUKOCYTE ESTERASE UR QL STRIP.AUTO: ABNORMAL
LIPASE SERPL-CCNC: 55 U/L (ref 73–393)
LYMPHOCYTES # BLD: 2.1 K/UL (ref 0.9–3.6)
LYMPHOCYTES NFR BLD: 33 % (ref 21–52)
MCH RBC QN AUTO: 27.3 PG (ref 24–34)
MCHC RBC AUTO-ENTMCNC: 31.5 G/DL (ref 31–37)
MCV RBC AUTO: 86.7 FL (ref 74–97)
MONOCYTES # BLD: 0.5 K/UL (ref 0.05–1.2)
MONOCYTES NFR BLD: 8 % (ref 3–10)
NEUTS SEG # BLD: 3.6 K/UL (ref 1.8–8)
NEUTS SEG NFR BLD: 56 % (ref 40–73)
NITRITE UR QL STRIP.AUTO: NEGATIVE
PH UR STRIP: 5.5 [PH] (ref 5–8)
PLATELET # BLD AUTO: 263 K/UL (ref 135–420)
PMV BLD AUTO: 9.4 FL (ref 9.2–11.8)
POTASSIUM SERPL-SCNC: 4.3 MMOL/L (ref 3.5–5.5)
PROT SERPL-MCNC: 7.6 G/DL (ref 6.4–8.2)
PROT UR STRIP-MCNC: NEGATIVE MG/DL
PROTHROMBIN TIME: 13.2 SEC (ref 11.5–15.2)
RBC # BLD AUTO: 4.58 M/UL (ref 4.2–5.3)
RBC #/AREA URNS HPF: ABNORMAL /HPF (ref 0–5)
SODIUM SERPL-SCNC: 139 MMOL/L (ref 136–145)
SP GR UR REFRACTOMETRY: 1.01 (ref 1–1.03)
UROBILINOGEN UR QL STRIP.AUTO: 0.2 EU/DL (ref 0.2–1)
WBC # BLD AUTO: 6.5 K/UL (ref 4.6–13.2)
WBC URNS QL MICRO: ABNORMAL /HPF (ref 0–5)

## 2021-03-24 PROCEDURE — 80053 COMPREHEN METABOLIC PANEL: CPT

## 2021-03-24 PROCEDURE — 99285 EMERGENCY DEPT VISIT HI MDM: CPT

## 2021-03-24 PROCEDURE — 81001 URINALYSIS AUTO W/SCOPE: CPT

## 2021-03-24 PROCEDURE — 85610 PROTHROMBIN TIME: CPT

## 2021-03-24 PROCEDURE — 85025 COMPLETE CBC W/AUTO DIFF WBC: CPT

## 2021-03-24 PROCEDURE — 85730 THROMBOPLASTIN TIME PARTIAL: CPT

## 2021-03-24 PROCEDURE — 71045 X-RAY EXAM CHEST 1 VIEW: CPT

## 2021-03-24 PROCEDURE — 83690 ASSAY OF LIPASE: CPT

## 2021-03-24 RX ORDER — CEPHALEXIN 250 MG/1
250 CAPSULE ORAL 4 TIMES DAILY
Qty: 28 CAP | Refills: 0 | Status: SHIPPED | OUTPATIENT
Start: 2021-03-24 | End: 2021-03-31

## 2021-03-24 NOTE — ED TRIAGE NOTES
Pt arrived via EMS for c/o fever and runny nose that started this morning. Pt reports she took temp at home and it was 104. Pt also complaining of bilateral flank pain x 2 wks.

## 2021-03-24 NOTE — ED PROVIDER NOTES
EMERGENCY DEPARTMENT HISTORY AND PHYSICAL EXAM    Date: 3/24/2021  Patient Name: Loren Riley    History of Presenting Illness     Chief Complaint   Patient presents with    Fever     History Provided By: Patient    Chief Complaint: fever    Additional History (Context):   3:12 PM  Loren Riley is a 77 y.o. female with PMHX liver disease, DM, HTN presents to the emergency department C/O fever. Patient states she checks her temperature every morning with both of her thermometers. She felt warm so she kept checking it. At one point she noticed an elevated reading and that prompted her to call EMS. She denies any cough, chest pain, shortness of breath, runny nose, sore throat, abdominal pain, vomiting, diarrhea, urinary symptoms. Patient states 2 weeks ago she had some pain in her \"front side\" but that has since resolved. Patient does have a history of high blood pressure and states she did take her blood pressure medications this morning. PCP: Narayan Fu MD    Current Outpatient Medications   Medication Sig Dispense Refill    cephALEXin (Keflex) 250 mg capsule Take 1 Cap by mouth four (4) times daily for 7 days. 28 Cap 0    furosemide (LASIX) 40 mg tablet TAKE 1 TABLET EVERY DAY 90 Tab 3    spironolactone (ALDACTONE) 100 mg tablet TAKE 1 TABLET EVERY DAY 90 Tab 3    methocarbamoL (Robaxin) 500 mg tablet Take 2 Tabs by mouth three (3) times daily. 30 Tab 0    aspirin delayed-release 81 mg tablet Take  by mouth daily.  amLODIPine (NORVASC) 2.5 mg tablet       LANTUS SOLOSTAR 100 unit/mL (3 mL) inpn 26 Units daily. Past History     Past Medical History:  Past Medical History:   Diagnosis Date    Diabetes ([address]     Hypertension     Liver disease        Past Surgical History:  No past surgical history on file. Family History:  History reviewed. No pertinent family history.     Social History:  Social History     Tobacco Use    Smoking status: Never Smoker    Smokeless tobacco: Never Used   Substance Use Topics    Alcohol use: Yes     Comment: 2 Drinks every 2 months    Drug use: No       Allergies: Allergies   Allergen Reactions    Lisinopril Angioedema       Review of Systems   Review of Systems   Constitutional: Positive for fever (subjective ). Negative for chills. HENT: Negative for congestion, rhinorrhea, sinus pressure, sinus pain and sore throat. Respiratory: Negative for cough and shortness of breath. Cardiovascular: Negative for chest pain. Gastrointestinal: Negative for abdominal pain, diarrhea, nausea and vomiting. Genitourinary: Negative for decreased urine volume, difficulty urinating, dyspareunia, dysuria, enuresis, flank pain, frequency and urgency. Musculoskeletal: Negative for myalgias. Skin: Negative for rash. Neurological: Negative for weakness and numbness. All other systems reviewed and are negative. Physical Exam     Vitals:    03/24/21 1445 03/24/21 1500 03/24/21 1515 03/24/21 1545   BP: (!) 184/58 (!) 164/63 (!) 168/62    Pulse:       Resp:       Temp:       SpO2: 100% 100% 100% 100%   Weight:       Height:         Physical Exam  Vitals signs and nursing note reviewed. Constitutional:       General: She is not in acute distress. Appearance: She is well-developed. Comments: Morbidly obese female lying on stretcher alert oriented nontoxic no acute distress   HENT:      Head: Normocephalic and atraumatic. Right Ear: Tympanic membrane, ear canal and external ear normal. Tympanic membrane is not perforated, erythematous, retracted or bulging. Left Ear: Tympanic membrane, ear canal and external ear normal. Tympanic membrane is not perforated, erythematous, retracted or bulging. Nose: Nose normal. No mucosal edema or rhinorrhea. Right Sinus: No maxillary sinus tenderness or frontal sinus tenderness. Left Sinus: No maxillary sinus tenderness or frontal sinus tenderness.       Mouth/Throat:      Mouth: Mucous membranes are not dry. Pharynx: Uvula midline. No oropharyngeal exudate, posterior oropharyngeal erythema or uvula swelling. Tonsils: No tonsillar abscesses. Neck:      Musculoskeletal: Normal range of motion and neck supple. Cardiovascular:      Rate and Rhythm: Normal rate and regular rhythm. Heart sounds: Normal heart sounds. No murmur. Pulmonary:      Effort: Pulmonary effort is normal. No respiratory distress. Breath sounds: Normal breath sounds. No wheezing or rales. Abdominal:      General: Bowel sounds are normal.      Palpations: Abdomen is soft. Tenderness: There is abdominal tenderness. Comments: Patient is tender to palpation along the upper abdomen   Lymphadenopathy:      Cervical: No cervical adenopathy. Skin:     General: Skin is warm and dry. Findings: No rash. Neurological:      Mental Status: She is alert and oriented to person, place, and time. Psychiatric:         Judgment: Judgment normal.         Diagnostic Study Results     Labs:     Recent Results (from the past 12 hour(s))   CBC WITH AUTOMATED DIFF    Collection Time: 03/24/21  3:40 PM   Result Value Ref Range    WBC 6.5 4.6 - 13.2 K/uL    RBC 4.58 4.20 - 5.30 M/uL    HGB 12.5 12.0 - 16.0 g/dL    HCT 39.7 35.0 - 45.0 %    MCV 86.7 74.0 - 97.0 FL    MCH 27.3 24.0 - 34.0 PG    MCHC 31.5 31.0 - 37.0 g/dL    RDW 16.2 (H) 11.6 - 14.5 %    PLATELET 541 425 - 750 K/uL    MPV 9.4 9.2 - 11.8 FL    NEUTROPHILS 56 40 - 73 %    LYMPHOCYTES 33 21 - 52 %    MONOCYTES 8 3 - 10 %    EOSINOPHILS 3 0 - 5 %    BASOPHILS 0 0 - 2 %    ABS. NEUTROPHILS 3.6 1.8 - 8.0 K/UL    ABS. LYMPHOCYTES 2.1 0.9 - 3.6 K/UL    ABS. MONOCYTES 0.5 0.05 - 1.2 K/UL    ABS. EOSINOPHILS 0.2 0.0 - 0.4 K/UL    ABS.  BASOPHILS 0.0 0.0 - 0.1 K/UL    DF AUTOMATED     METABOLIC PANEL, COMPREHENSIVE    Collection Time: 03/24/21  3:40 PM   Result Value Ref Range    Sodium 139 136 - 145 mmol/L    Potassium 4.3 3.5 - 5.5 mmol/L    Chloride 106 100 - 111 mmol/L    CO2 29 21 - 32 mmol/L    Anion gap 4 3.0 - 18 mmol/L    Glucose 104 (H) 74 - 99 mg/dL    BUN 9 7.0 - 18 MG/DL    Creatinine 0.69 0.6 - 1.3 MG/DL    BUN/Creatinine ratio 13 12 - 20      GFR est AA >60 >60 ml/min/1.73m2    GFR est non-AA >60 >60 ml/min/1.73m2    Calcium 8.6 8.5 - 10.1 MG/DL    Bilirubin, total 0.3 0.2 - 1.0 MG/DL    ALT (SGPT) 13 13 - 56 U/L    AST (SGOT) 13 10 - 38 U/L    Alk. phosphatase 89 45 - 117 U/L    Protein, total 7.6 6.4 - 8.2 g/dL    Albumin 3.2 (L) 3.4 - 5.0 g/dL    Globulin 4.4 (H) 2.0 - 4.0 g/dL    A-G Ratio 0.7 (L) 0.8 - 1.7     PROTHROMBIN TIME + INR    Collection Time: 03/24/21  3:40 PM   Result Value Ref Range    Prothrombin time 13.2 11.5 - 15.2 sec    INR 1.0 0.8 - 1.2     PTT    Collection Time: 03/24/21  3:40 PM   Result Value Ref Range    aPTT 27.2 23.0 - 36.4 SEC   LIPASE    Collection Time: 03/24/21  3:40 PM   Result Value Ref Range    Lipase 55 (L) 73 - 393 U/L   URINALYSIS W/ RFLX MICROSCOPIC    Collection Time: 03/24/21  3:50 PM   Result Value Ref Range    Color YELLOW      Appearance CLOUDY      Specific gravity 1.014 1.005 - 1.030      pH (UA) 5.5 5.0 - 8.0      Protein Negative NEG mg/dL    Glucose Negative NEG mg/dL    Ketone Negative NEG mg/dL    Bilirubin Negative NEG      Blood MODERATE (A) NEG      Urobilinogen 0.2 0.2 - 1.0 EU/dL    Nitrites Negative NEG      Leukocyte Esterase LARGE (A) NEG     URINE MICROSCOPIC ONLY    Collection Time: 03/24/21  3:50 PM   Result Value Ref Range    WBC 11 to 20 0 - 5 /hpf    RBC 4 to 10 0 - 5 /hpf    Epithelial cells 4+ 0 - 5 /lpf    Bacteria 3+ (A) NEG /hpf       Radiologic Studies:   XR CHEST PORT   Final Result      No acute radiographic cardiopulmonary abnormality. CT Results  (Last 48 hours)    None        CXR Results  (Last 48 hours)               03/24/21 1536  XR CHEST PORT Final result    Impression:      No acute radiographic cardiopulmonary abnormality.         Narrative:  EXAM: XR CHEST PORT CLINICAL INDICATION/HISTORY: fever   -Additional: None       COMPARISON: March 8, 2019       TECHNIQUE: Frontal view of the chest       _______________       FINDINGS:       HEART AND MEDIASTINUM: Normal cardiac size and mediastinal contours. LUNGS AND PLEURAL SPACES: No focal pneumonic consolidation, pneumothorax, or   pleural effusion. BONY THORAX AND SOFT TISSUES: No acute osseous abnormality       _______________                 Medical Decision Making   I am the first provider for this patient. I reviewed the vital signs, available nursing notes, past medical history, past surgical history, family history and social history. Vital Signs: Reviewed the patient's vital signs. Pulse Oximetry Analysis: 100% on RA       Records Reviewed: Nursing Notes and Old Medical Records    Procedures:  Procedures    ED Course:   3:12 PM Initial assessment performed. The patients presenting problems have been discussed, and they are in agreement with the care plan formulated and outlined with them. I have encouraged them to ask questions as they arise throughout their visit. Discussion:  Pt presents with subjective fever at home. Patient has no real symptoms. Labs within normal limits. Vital signs are stable. UA does show signs of infection will treat with antibiotic. . Strict return precautions given, pt offering no questions or complaints. Diagnosis and Disposition     DISCHARGE NOTE:  Kiran Leo  results have been reviewed with her. She has been counseled regarding her diagnosis, treatment, and plan. She verbally conveys understanding and agreement of the signs, symptoms, diagnosis, treatment and prognosis and additionally agrees to follow up as discussed. She also agrees with the care-plan and conveys that all of her questions have been answered.   I have also provided discharge instructions for her that include: educational information regarding their diagnosis and treatment, and list of reasons why they would want to return to the ED prior to their follow-up appointment, should her condition change. She has been provided with education for proper emergency department utilization. CLINICAL IMPRESSION:    1. Urinary tract infection with hematuria, site unspecified        PLAN:  1. D/C Home  2. Discharge Medication List as of 3/24/2021  4:54 PM        3. Follow-up Information     Follow up With Specialties Details Why Contact Info    Noreen Aguilar MD Family Medicine Schedule an appointment as soon as possible for a visit   [address] [place] [address]      [facility] EMERGENCY DEPT Emergency Medicine  If symptoms worsen [address] [place] [address]  [phone]             Please note that this dictation was completed with "[employer] the computer voice recognition software. Quite often unanticipated grammatical, syntax, homophones, and other interpretive errors are inadvertently transcribed by the computer software. Please disregard these errors. Please excuse any errors that have escaped final proofreading.

## 2021-04-06 ENCOUNTER — OFFICE VISIT (OUTPATIENT)
Dept: HEMATOLOGY | Age: 66
End: 2021-04-06
Payer: MEDICARE

## 2021-04-06 VITALS
SYSTOLIC BLOOD PRESSURE: 186 MMHG | WEIGHT: 293 LBS | DIASTOLIC BLOOD PRESSURE: 58 MMHG | TEMPERATURE: 98.7 F | BODY MASS INDEX: 57.57 KG/M2

## 2021-04-06 DIAGNOSIS — K75.4 AUTOIMMUNE HEPATITIS (HCC): Primary | ICD-10-CM

## 2021-04-06 PROCEDURE — G8754 DIAS BP LESS 90: HCPCS | Performed by: NURSE PRACTITIONER

## 2021-04-06 PROCEDURE — G8536 NO DOC ELDER MAL SCRN: HCPCS | Performed by: NURSE PRACTITIONER

## 2021-04-06 PROCEDURE — 1101F PT FALLS ASSESS-DOCD LE1/YR: CPT | Performed by: NURSE PRACTITIONER

## 2021-04-06 PROCEDURE — G8400 PT W/DXA NO RESULTS DOC: HCPCS | Performed by: NURSE PRACTITIONER

## 2021-04-06 PROCEDURE — 3017F COLORECTAL CA SCREEN DOC REV: CPT | Performed by: NURSE PRACTITIONER

## 2021-04-06 PROCEDURE — G8427 DOCREV CUR MEDS BY ELIG CLIN: HCPCS | Performed by: NURSE PRACTITIONER

## 2021-04-06 PROCEDURE — 99213 OFFICE O/P EST LOW 20 MIN: CPT | Performed by: NURSE PRACTITIONER

## 2021-04-06 PROCEDURE — G8417 CALC BMI ABV UP PARAM F/U: HCPCS | Performed by: NURSE PRACTITIONER

## 2021-04-06 PROCEDURE — G8753 SYS BP > OR = 140: HCPCS | Performed by: NURSE PRACTITIONER

## 2021-04-06 PROCEDURE — 1090F PRES/ABSN URINE INCON ASSESS: CPT | Performed by: NURSE PRACTITIONER

## 2021-04-06 PROCEDURE — G8432 DEP SCR NOT DOC, RNG: HCPCS | Performed by: NURSE PRACTITIONER

## 2021-04-06 NOTE — PROGRESS NOTES
[facility]      Lucrecia Scott MD, Nelson Cornell, Magda Sanchez MD, MPH      Danita Holter, PA-REGGIE Huggins, [facility]-BC     Marlene Lewis, [facility]   ALIZA Merrill-REGGIE Yusuf, [facility]       Meg Pierre [facility] [address]    at [address], [address] [place] [address], [facility] 22.    [phone]    FAX: [address] [place] [address]    at [address], [address]    [phone]    FAX: [phone]       Patient Care Team:  Clara Veliz MD as PCP - General (Family Medicine)  Lizabeth España MD (Gastroenterology)      Problem List  Date Reviewed: 1/11/2021          Codes Class Noted    Obesity, morbid [facility]) ICD-10-CM: E66.01  ICD-9-CM: 278.01  2/15/2018        Autoimmune hepatitis ([facility].) ICD-10-CM: K75.4  ICD-9-CM: 571.42  5/30/2017        Diabetes mellitus, type 2 ([facility] [address] ICD-10-CM: E11.9  ICD-9-CM: 250.00  5/30/2017        Edema ICD-10-CM: R60.9  ICD-9-CM: 782.3  5/30/2017        Hypertension ICD-10-CM: I10  ICD-9-CM: 401.9  5/30/2017        Hypercholesterolemia ICD-10-CM: E78.00  ICD-9-CM: 272.0  5/30/2017              Yisel Robison [facility] the [facility] for management of autoimmune hepatitis. The active problem list, all pertinent past medical history, medications, and laboratory findings related to the liver disorder were reviewed with the patient. The patient is a 77 y.o. female who was first noted to have abnormalities in liver transaminases and alkaline phosphase in 3/2017. Further evaluation of these abnormalities has included serologic studies which were positive for ASMA    Ultrasound of the liver was performed in 3/2017. This demonstrated slightly increased echogenicity. A liver biopsy was performed in 3/2017. This demonstrated severe inflammation and portal fibrosis. The patient has no symptoms which can be attributed to the liver disorder. The patient has not experienced pain in the right side over the liver, yellowing of the eyes or skin, problems concentrating. The patient has moderate limitations in functional activities which can be attributed to other medical problems that are not related to the liver disease. ALLERGIES  Allergies   Allergen Reactions    Lisinopril Angioedema       MEDICATIONS  Current Outpatient Medications   Medication Sig    furosemide (LASIX) 40 mg tablet TAKE 1 TABLET EVERY DAY    spironolactone (ALDACTONE) 100 mg tablet TAKE 1 TABLET EVERY DAY    methocarbamoL (Robaxin) 500 mg tablet Take 2 Tabs by mouth three (3) times daily.  aspirin delayed-release 81 mg tablet Take  by mouth daily.  amLODIPine (NORVASC) 2.5 mg tablet     LANTUS SOLOSTAR 100 unit/mL (3 mL) inpn 26 Units daily. No current facility-administered medications for this visit. SYSTEM REVIEW NOT RELATED TO LIVER DISEASE OR REVIEWED ABOVE:  Constitution systems: morbid obesity  Eyes: Negative for visual changes. ENT: Negative for sore throat, painful swallowing. Respiratory: Negative for cough, hemoptysis, SOB. Cardiology: Negative for chest pain, palpitations. GI:  Negative for constipation or diarrhea. : Negative for urinary frequency, dysuria, hematuria, nocturia. Skin: Negative for rash. Hematology: Negative for easy bruising, blood clots. Musculo-skelatal: Arthritis of knees. Neurologic: Negative for headaches, dizziness, vertigo, memory problems not related to HE. Psychology: Negative for anxiety, depression. FAMILY HISTORY:  The father  of DM. The mother  of Sarcoidosis. There is no family history of liver disease. There is no family history of immune disorders. SOCIAL HISTORY:  The patient is .     The patient has 2 children, and 7 grandchildren. The patient has never used tobacco products. The patient consumes alcohol on social occasions never in excess. The patient has been abstinent from alcohol since 3/2017. The patient used to work in [employer] for children. The patient has not worked since 7/2015. PHYSICAL EXAM:    Visit Vitals  BP (!) 186/58   Temp 98.7 °F (37.1 °C) (Tympanic)   Wt 325 lb (147.4 kg)   BMI 57.57 kg/m²       General: No acute distress. Eyes: Sclera anicteric. ENT: No oral lesions. Thyroid normal.  Nodes: No adenopathy. Skin: No spider angiomata. No jaundice. No palmar erythema. Respiratory: Lungs clear to auscultation. Cardiovascular: Regular heart rate. No murmurs. No JVD. Abdomen: Soft non-tender. Liver size normal to percussion/palpation. Spleen not palpable. No obvious ascites. Extremities: No edema. No muscle wasting. No gross arthritic changes. Neurologic: Alert and oriented. Cranial nerves grossly intact. No asterixis. LABORATORY STUDIES:  Liver [place] [address] & Units 3/24/2021   WBC 4.6 - 13.2 K/uL 6.5   ANC 1.8 - 8.0 K/UL 3.6   HGB 12.0 - 16.0 g/dL 12.5    - 420 K/uL 263   INR 0.8 - 1.2   1.0   AST 10 - 38 U/L 13   ALT 13 - 56 U/L 13   Alk Phos 45 - 117 U/L 89   Bili, Total 0.2 - 1.0 MG/DL 0.3   Albumin 3.4 - 5.0 g/dL 3.2 (L)   BUN 7.0 - 18 MG/DL 9   Creat 0.6 - 1.3 MG/DL 0.69   Na 136 - 145 mmol/L 139   K 3.5 - 5.5 mmol/L 4.3   Cl 100 - 111 mmol/L 106   CO2 21 - 32 mmol/L 29   Glucose 74 - 99 mg/dL 104 (H)     TACROLIMUS LEVEL:  Liver Virology and Transplant Immune Suppression Latest Ref Rng & Units 11/21/2019   Tacrolimus Level 2.0 - 20.0 ng/mL None detected     Liver Virology and Transplant Immune Suppression Latest Ref Rng & Units 5/20/2019   Tacrolimus Level 2.0 - 20.0 ng/mL None detected       SEROLOGIES:  3/2017.   HBsAntigen negative, anti-HCV negative, Ferritin 239, iron saturation 11%, KAYLEE negative, ASMA positive 1:320, LIVER HISTOLOGY:  3/2017. Severe active hepatitis with PMN and portal fibrosis. Biopsy slides not reviewed by MLS.  5/2018. Shear wave elastography performed at [facility]. EkPa was E Range: 4.24-9.41 kPa, E Mean: 6.39 kPa, E Median: 6.10 kPa, E Std: 1.54 kPa. The results suggested a fibrosis level of F1.  11/2019. Shear wave elastography performed at [facility]. EkPa was E Range: 10.32 kPa, E Mean: 12.49 kPa, E Median: 13.02 kPa, E Std: 5.68 kPa. The results suggested a fibrosis level of F4. The high IQR% suggests that the measurement may not be reliable. 6/2020. Shear wave elastography performed at [facility]. EkPa was E Range: 12.9-19.96 kPa, E Mean: 15.98 kPa, E Median: 15.25 kPa, E Std: 2.66 kPa. The results suggested a fibrosis level of F4. The high IQR% suggests that the measurement may not be reliable. ENDOSCOPIC PROCEDURES:  Not available or performed    RADIOLOGY:  3/2017. Ultrasound of liver. Mild increased echogenicity. No liver mass lesions. No dilated bile ducts. No ascites. 5/2018. Ultrasound of the liver. Mild increased echogenicity. No liver mass lesions. No dilated bile ducts. No ascites. 6/2019. Ultrasound of the liver. Mild increased echogenicity. No liver mass lesions. No dilated bile ducts. No ascites. 6/2020. Ultrasound of liver. Echogenic consistent with chronic liver disease. No liver mass lesions. No dilated bile ducts. No ascites. OTHER TESTING:  Not available or performed    ASSESSMENT AND PLAN:  Autoimmune Hepatitis with portal fibrosis    Liver transaminases are normal.  Alkaline phosphate is normal.  Liver function is normal.  The platelet count is normal.      The patient had been prescribed treatment with Tacrolimus 1 mg twice daily. She is no longer on medication and had not been taking it as directed for over a year. Discussed we will continue to monitor labs to monitor for possible flair of AIH. The patient was directed to continue all other medications at the current dosages. There are no contraindications for the patient to take any medications that are necessary for treatment of other medical issues. The need to assess liver fibrosis was discussed. Shear wave elastography can assess liver fibrosis and determine if a patient has advanced fibrosis or cirrhosis without the need for liver biopsy. The elastography can be repeated annually or as often as clinically indicated to assess for fibrosis progression and/or regression. Due to body habitus it is not an accurate indicator. The patient was counseled regarding alcohol consumption. Vaccination for viral hepatitis A and B is recommended since the patient has no serologic evidence of previous exposure or vaccination with immunity. Have reviewed laboratory testing to monitor liver function and degree of liver injury. This included BMP, hepatic panel, CBC with platelet count. FOLLOW-UP:  All of the issues listed above in the Assessment and Plan were discussed with the patient. All questions were answered. The patient expressed a clear understanding of the above. [address] [address] in 6 months for routine monitoring.       Emperatriz Aldana, AGPCNP-BC  [address] [facility] Liver [place] of [address]  [phone]

## 2021-05-01 ENCOUNTER — HOSPITAL ENCOUNTER (EMERGENCY)
Age: 66
Discharge: HOME | End: 2021-05-01
Attending: EMERGENCY MEDICINE
Payer: MEDICARE

## 2021-05-01 ENCOUNTER — APPOINTMENT (OUTPATIENT)
Dept: VASCULAR SURGERY | Age: 66
End: 2021-05-01
Attending: EMERGENCY MEDICINE
Payer: MEDICARE

## 2021-05-01 VITALS
RESPIRATION RATE: 20 BRPM | BODY MASS INDEX: 51.91 KG/M2 | OXYGEN SATURATION: 100 % | TEMPERATURE: 97.1 F | SYSTOLIC BLOOD PRESSURE: 172 MMHG | HEART RATE: 72 BPM | HEIGHT: 63 IN | WEIGHT: 293 LBS | DIASTOLIC BLOOD PRESSURE: 62 MMHG

## 2021-05-01 DIAGNOSIS — M25.561 ARTHRALGIA OF BOTH LOWER LEGS: ICD-10-CM

## 2021-05-01 DIAGNOSIS — M25.562 ARTHRALGIA OF BOTH LOWER LEGS: ICD-10-CM

## 2021-05-01 DIAGNOSIS — M79.10 MYALGIA: Primary | ICD-10-CM

## 2021-05-01 PROCEDURE — 99283 EMERGENCY DEPT VISIT LOW MDM: CPT

## 2021-05-01 PROCEDURE — 74011250637 HC RX REV CODE- 250/637: Performed by: EMERGENCY MEDICINE

## 2021-05-01 PROCEDURE — 93970 EXTREMITY STUDY: CPT

## 2021-05-01 RX ORDER — AMLODIPINE BESYLATE 5 MG/1
5 TABLET ORAL
Status: COMPLETED | OUTPATIENT
Start: 2021-05-01 | End: 2021-05-01

## 2021-05-01 RX ORDER — FUROSEMIDE 40 MG/1
40 TABLET ORAL
Status: COMPLETED | OUTPATIENT
Start: 2021-05-01 | End: 2021-05-01

## 2021-05-01 RX ORDER — HYDROCODONE BITARTRATE AND ACETAMINOPHEN 5; 325 MG/1; MG/1
1 TABLET ORAL
Status: COMPLETED | OUTPATIENT
Start: 2021-05-01 | End: 2021-05-01

## 2021-05-01 RX ADMIN — AMLODIPINE BESYLATE 5 MG: 5 TABLET ORAL at 00:40

## 2021-05-01 RX ADMIN — HYDROCODONE BITARTRATE AND ACETAMINOPHEN 1 TABLET: 5; 325 TABLET ORAL at 02:08

## 2021-05-01 RX ADMIN — FUROSEMIDE 40 MG: 40 TABLET ORAL at 00:39

## 2021-05-01 NOTE — ED NOTES
I have reviewed discharge instructions with the patient. The patient verbalized understanding. NAD. VSS. Easy WOB. AOx4. Wheeled to lobby.

## 2021-05-01 NOTE — ED PROVIDER NOTES
EMERGENCY DEPARTMENT HISTORY AND PHYSICAL EXAM    Date: 5/1/2021  Patient Name: Adriana Marroquin    History of Presenting Illness     Chief Complaint   Patient presents with    Leg Pain         History Provided By: Patient    Additional History (Context):   Adriana Marroquin is a 77 y.o. female with PMHX diabetes, hypertension presents to the emergency department via private vehicle C/O bilateral lower extremity pain after receiving the [place] vaccine today. Patient reports that the right lower extremity pain is worse than the left lower extremity pain. Patient reports that it is sharp pain extending from her anterior shin extending up to her thigh. No history of DVT or PE. Pt denies chest pain, shortness of breath, fever, nausea or vomiting, and any other sxs or complaints. No relieving or exacerbating factors identified. Denies alcohol, drug and tobacco use. PCP: Alexei Sotelo MD    Current Facility-Administered Medications   Medication Dose Route Frequency Provider Last Rate Last Admin    HYDROcodone-acetaminophen (NORCO) 5-325 mg per tablet 1 Tab  1 Tab Oral NOW Beth Wilder DO         Current Outpatient Medications   Medication Sig Dispense Refill    furosemide (LASIX) 40 mg tablet TAKE 1 TABLET EVERY DAY 90 Tab 3    spironolactone (ALDACTONE) 100 mg tablet TAKE 1 TABLET EVERY DAY 90 Tab 3    methocarbamoL (Robaxin) 500 mg tablet Take 2 Tabs by mouth three (3) times daily. 30 Tab 0    aspirin delayed-release 81 mg tablet Take  by mouth daily.  amLODIPine (NORVASC) 2.5 mg tablet       LANTUS SOLOSTAR 100 unit/mL (3 mL) inpn 26 Units daily. Past History     Past Medical History:  Past Medical History:   Diagnosis Date    Diabetes ([address]     Hypertension     Liver disease        Past Surgical History:  History reviewed. No pertinent surgical history. Family History:  History reviewed. No pertinent family history.     Social History:  Social History     Tobacco Use    Smoking status: Never Smoker    Smokeless tobacco: Never Used   Substance Use Topics    Alcohol use: Yes     Comment: 2 Drinks every 2 months    Drug use: No       Allergies: Allergies   Allergen Reactions    Lisinopril Angioedema         Review of Systems   Review of Systems   Constitutional: Negative for chills and fever. HENT: Negative for congestion, ear pain, sinus pain and sore throat. Eyes: Negative for pain and visual disturbance. Respiratory: Negative for cough and shortness of breath. Cardiovascular: Negative for chest pain and leg swelling. Gastrointestinal: Negative for abdominal pain, constipation, diarrhea, nausea and vomiting. Genitourinary: Negative for dysuria, hematuria, vaginal bleeding and vaginal discharge. Musculoskeletal: Positive for arthralgias and myalgias. Negative for back pain and neck pain. Skin: Negative for rash and wound. Neurological: Negative for dizziness, tremors, weakness, light-headedness and numbness. All other systems reviewed and are negative. Physical Exam     Vitals:    05/01/21 0019 05/01/21 0041 05/01/21 0158   BP: (!) 210/59  (!) 172/62   Pulse: 76  72   Resp: 20  20   Temp: 97.1 °F (36.2 °C)     SpO2: 100% 100% 100%   Weight: 140.6 kg (310 lb)     Height: 5' 3\" (1.6 m)       Physical Exam    Nursing note and vitals reviewed    Constitutional: Elderly obese -American female, no acute distress, appears stated age  Head: Normocephalic, Atraumatic  Eyes: Pupils are equal, round, and reactive to light, EOMI, noninjected conjunctiva  Neck: Supple, non-tender, trachea midline, no JVD  Chest: Normal work of breathing and symmetrical chest excursion bilaterally  Back: No evidence of trauma or deformity  Extremities: No evidence of trauma or deformity, chronic +1 nonpitting lymphedema of her lower extremities. There is no overlying erythema or ecchymoses. However patient with mild tenderness over her anterior shins and calf.   Negative Dixie's. Compartments are soft and supple. No streaking erythema, vesicular lesions, ulcerations or bulla. +2 DP pulses bilaterally. Skin: Warm and dry, normal cap refill  Neuro: Alert and appropriate, CN intact, normal speech, moving all 4 extremities freely and symmetrically  Psychiatric: Normal mood and affect       Diagnostic Study Results     Labs -   No results found for this or any previous visit (from the past 12 hour(s)). Radiologic Studies -   No orders to display     CT Results  (Last 48 hours)    None        CXR Results  (Last 48 hours)    None        Interpretation Summary    · No evidence of deep vein thrombosis in the right common femoral.  · No evidence of deep vein thrombosis in the left lower extremity. · The common femoral, saphenous femoral junction, proximal femoral, mid femoral and popliteal vein(s) were imaged in the transverse and longitudinal planes. The vessels showed normal color filling and compressibility. Doppler interrogation of the veins showed phasic and spontaneous flow. · The distal femoral, posterior tibial and peroneal vein(s) were not well visualized due to body habitus. Lower Extremity Venous Findings    Right Lower Venous    No evidence of deep vein thrombosis in the right lower extremity. The common femoral vein(s) were imaged in the transverse and longitudinal planes. The vessels showed normal color filling and compressibility. Doppler interrogation of the veins showed phasic and spontaneous flow. Left Lower Venous    No evidence of deep vein thrombosis in the left lower extremity. The common femoral, saphenous femoral junction, proximal femoral, mid femoral and popliteal vein(s) were imaged in the transverse and longitudinal planes. The vessels showed normal color filling and compressibility. Doppler interrogation of the veins showed phasic and spontaneous flow. The distal femoral, posterior tibial and peroneal vein(s) were not well visualized. Medical Decision Making   I am the first provider for this patient. I reviewed the vital signs, available nursing notes, past medical history, past surgical history, family history and social history. Vital Signs-Reviewed the patient's vital signs. Pulse Oximetry Analysis -100% on room air    Cardiac Monitor:  Rate: 76 bpm  Rhythm: Regular    Records Reviewed: Nursing Notes and Old Medical Records    Provider Notes:   77 y.o. female who reports pain to her lower extremities after receiving the Pfizer vaccine earlier today. On exam patient's blood pressure is significantly elevated 210/59. However patient asymptomatic with no chest pain or shortness of breath. Endorses that she did not take her blood pressure medicine tonight. The patient is asymptomatic and has not had her blood pressure medicine, there is no indication for any labs. Will give her blood pressure medicine and reassess patient's blood pressure. In regards to patient's lower extremity pain, patient with chronic +1 nonpitting lymphedema however no obvious crepitus or deformity. Patient has some tenderness over her anterior shins and calf but no overlying erythema or ecchymoses. Compartments are soft and supple and she is neurovascular intact. Pfizer vaccine is not known to cause any coagulopathy however will obtain ultrasound to rule out DVT. Patient with no symptoms or concerns for PE, and is not hypoxic, not tachycardic. Procedures:  Procedures    ED Course:   01:15 AM Initial assessment performed. The patients presenting problems have been discussed, and they are in agreement with the care plan formulated and outlined with them. I have encouraged them to ask questions as they arise throughout their visit.    2:01 AM  Patient is back to her ultrasound is negative bilaterally. Her blood pressure has improved to 172/62 with her amlodipine and Lasix.   Discussed possibility of Covid vaccine side effects including myalgias, recommend Motrin and Tylenol for symptom control at home. Will give a dose of [place] in the emergency department. Diagnosis and Disposition       DISCHARGE NOTE:  2:01 AM    Chary Bazan  results have been reviewed with her. She has been counseled regarding her diagnosis, treatment, and plan. She verbally conveys understanding and agreement of the signs, symptoms, diagnosis, treatment and prognosis and additionally agrees to follow up as discussed. She also agrees with the care-plan and conveys that all of her questions have been answered. I have also provided discharge instructions for her that include: educational information regarding their diagnosis and treatment, and list of reasons why they would want to return to the ED prior to their follow-up appointment, should her condition change. She has been provided with education for proper emergency department utilization. CLINICAL IMPRESSION:    1. Myalgia    2. Arthralgia of both lower legs        PLAN:  1. D/C Home  2. Current Discharge Medication List        3. Follow-up Information     Follow up With Specialties Details Why Contact Info    Ksenia Oscar MD Family Medicine Schedule an appointment as soon as possible for a visit in 2 days  [address] [place] [address]      [facility] EMERGENCY DEPT Emergency Medicine  As needed if symptoms worsen [address] [address]  [phone]        ____________________________________     Please note that this dictation was completed with WebTV, the computer voice recognition software. Quite often unanticipated grammatical, syntax, homophones, and other interpretive errors are inadvertently transcribed by the computer software. Please disregard these errors. Please excuse any errors that have escaped final proofreading.

## 2021-05-01 NOTE — ED TRIAGE NOTES
Patient c/o sudden onset b/l leg pain, right worse than left, x 1 hour ago. Pain began while sitting in chair. Patient had 2nd dose of pfizer vacc today. Patient did not take antihypertensive meds today.

## 2021-07-23 PROCEDURE — 96375 TX/PRO/DX INJ NEW DRUG ADDON: CPT

## 2021-07-23 PROCEDURE — 96374 THER/PROPH/DIAG INJ IV PUSH: CPT

## 2021-07-23 PROCEDURE — 99284 EMERGENCY DEPT VISIT MOD MDM: CPT

## 2021-07-24 ENCOUNTER — APPOINTMENT (OUTPATIENT)
Dept: GENERAL RADIOLOGY | Age: 66
End: 2021-07-24
Attending: EMERGENCY MEDICINE
Payer: MEDICARE

## 2021-07-24 ENCOUNTER — HOSPITAL ENCOUNTER (EMERGENCY)
Age: 66
Discharge: HOME | End: 2021-07-24
Attending: EMERGENCY MEDICINE
Payer: MEDICARE

## 2021-07-24 ENCOUNTER — APPOINTMENT (OUTPATIENT)
Dept: CT IMAGING | Age: 66
End: 2021-07-24
Attending: EMERGENCY MEDICINE
Payer: MEDICARE

## 2021-07-24 VITALS
OXYGEN SATURATION: 99 % | RESPIRATION RATE: 16 BRPM | SYSTOLIC BLOOD PRESSURE: 149 MMHG | TEMPERATURE: 98 F | DIASTOLIC BLOOD PRESSURE: 79 MMHG | BODY MASS INDEX: 51.91 KG/M2 | WEIGHT: 293 LBS | HEIGHT: 63 IN | HEART RATE: 84 BPM

## 2021-07-24 DIAGNOSIS — K80.20 CALCULUS OF GALLBLADDER WITHOUT CHOLECYSTITIS WITHOUT OBSTRUCTION: ICD-10-CM

## 2021-07-24 DIAGNOSIS — R10.84 ABDOMINAL PAIN, GENERALIZED: ICD-10-CM

## 2021-07-24 DIAGNOSIS — G44.209 ACUTE NON INTRACTABLE TENSION-TYPE HEADACHE: Primary | ICD-10-CM

## 2021-07-24 LAB
ALBUMIN SERPL-MCNC: 3.3 G/DL (ref 3.4–5)
ALBUMIN/GLOB SERPL: 0.7 {RATIO} (ref 0.8–1.7)
ALP SERPL-CCNC: 96 U/L (ref 45–117)
ALT SERPL-CCNC: 9 U/L (ref 13–56)
ANION GAP SERPL CALC-SCNC: 4 MMOL/L (ref 3–18)
AST SERPL-CCNC: 13 U/L (ref 10–38)
BASOPHILS # BLD: 0 K/UL (ref 0–0.1)
BASOPHILS NFR BLD: 0 % (ref 0–2)
BILIRUB DIRECT SERPL-MCNC: <0.1 MG/DL (ref 0–0.2)
BILIRUB SERPL-MCNC: 0.3 MG/DL (ref 0.2–1)
BUN SERPL-MCNC: 12 MG/DL (ref 7–18)
BUN/CREAT SERPL: 18 (ref 12–20)
CALCIUM SERPL-MCNC: 8.7 MG/DL (ref 8.5–10.1)
CHLORIDE SERPL-SCNC: 109 MMOL/L (ref 100–111)
CK MB CFR SERPL CALC: NORMAL % (ref 0–4)
CK MB SERPL-MCNC: <1 NG/ML (ref 5–25)
CK SERPL-CCNC: 56 U/L (ref 26–192)
CO2 SERPL-SCNC: 29 MMOL/L (ref 21–32)
CREAT SERPL-MCNC: 0.67 MG/DL (ref 0.6–1.3)
DIFFERENTIAL METHOD BLD: ABNORMAL
EOSINOPHIL # BLD: 0.2 K/UL (ref 0–0.4)
EOSINOPHIL NFR BLD: 3 % (ref 0–5)
ERYTHROCYTE [DISTWIDTH] IN BLOOD BY AUTOMATED COUNT: 15.4 % (ref 11.6–14.5)
GLOBULIN SER CALC-MCNC: 4.7 G/DL (ref 2–4)
GLUCOSE SERPL-MCNC: 184 MG/DL (ref 74–99)
HCT VFR BLD AUTO: 40.1 % (ref 35–45)
HGB BLD-MCNC: 12.6 G/DL (ref 12–16)
LIPASE SERPL-CCNC: 81 U/L (ref 73–393)
LYMPHOCYTES # BLD: 1.4 K/UL (ref 0.9–3.6)
LYMPHOCYTES NFR BLD: 17 % (ref 21–52)
MCH RBC QN AUTO: 27.3 PG (ref 24–34)
MCHC RBC AUTO-ENTMCNC: 31.4 G/DL (ref 31–37)
MCV RBC AUTO: 87 FL (ref 74–97)
MONOCYTES # BLD: 0.7 K/UL (ref 0.05–1.2)
MONOCYTES NFR BLD: 8 % (ref 3–10)
NEUTS SEG # BLD: 6 K/UL (ref 1.8–8)
NEUTS SEG NFR BLD: 72 % (ref 40–73)
PLATELET # BLD AUTO: 267 K/UL (ref 135–420)
PMV BLD AUTO: 10.6 FL (ref 9.2–11.8)
POTASSIUM SERPL-SCNC: 4 MMOL/L (ref 3.5–5.5)
PROT SERPL-MCNC: 8 G/DL (ref 6.4–8.2)
RBC # BLD AUTO: 4.61 M/UL (ref 4.2–5.3)
SODIUM SERPL-SCNC: 142 MMOL/L (ref 136–145)
TROPONIN I SERPL-MCNC: <0.02 NG/ML (ref 0–0.04)
WBC # BLD AUTO: 8.3 K/UL (ref 4.6–13.2)

## 2021-07-24 PROCEDURE — 80048 BASIC METABOLIC PNL TOTAL CA: CPT

## 2021-07-24 PROCEDURE — 85025 COMPLETE CBC W/AUTO DIFF WBC: CPT

## 2021-07-24 PROCEDURE — 74011000636 HC RX REV CODE- 636: Performed by: EMERGENCY MEDICINE

## 2021-07-24 PROCEDURE — 83690 ASSAY OF LIPASE: CPT

## 2021-07-24 PROCEDURE — 74177 CT ABD & PELVIS W/CONTRAST: CPT

## 2021-07-24 PROCEDURE — 80076 HEPATIC FUNCTION PANEL: CPT

## 2021-07-24 PROCEDURE — 96374 THER/PROPH/DIAG INJ IV PUSH: CPT

## 2021-07-24 PROCEDURE — 74011250636 HC RX REV CODE- 250/636: Performed by: EMERGENCY MEDICINE

## 2021-07-24 PROCEDURE — 70450 CT HEAD/BRAIN W/O DYE: CPT

## 2021-07-24 PROCEDURE — 82553 CREATINE MB FRACTION: CPT

## 2021-07-24 PROCEDURE — 74011250637 HC RX REV CODE- 250/637: Performed by: EMERGENCY MEDICINE

## 2021-07-24 PROCEDURE — 71045 X-RAY EXAM CHEST 1 VIEW: CPT

## 2021-07-24 PROCEDURE — 96375 TX/PRO/DX INJ NEW DRUG ADDON: CPT

## 2021-07-24 PROCEDURE — 93005 ELECTROCARDIOGRAM TRACING: CPT

## 2021-07-24 RX ORDER — MORPHINE SULFATE 4 MG/ML
4 INJECTION INTRAVENOUS
Status: COMPLETED | OUTPATIENT
Start: 2021-07-24 | End: 2021-07-24

## 2021-07-24 RX ORDER — GLYCOPYRROLATE 1 MG/1
1 TABLET ORAL
Qty: 20 TABLET | Refills: 0 | Status: SHIPPED | OUTPATIENT
Start: 2021-07-24

## 2021-07-24 RX ORDER — DICYCLOMINE HYDROCHLORIDE 10 MG/1
20 CAPSULE ORAL
Status: COMPLETED | OUTPATIENT
Start: 2021-07-24 | End: 2021-07-24

## 2021-07-24 RX ORDER — ONDANSETRON 2 MG/ML
4 INJECTION INTRAMUSCULAR; INTRAVENOUS
Status: COMPLETED | OUTPATIENT
Start: 2021-07-24 | End: 2021-07-24

## 2021-07-24 RX ORDER — METOCLOPRAMIDE 10 MG/1
10 TABLET ORAL
Qty: 20 TABLET | Refills: 0 | Status: SHIPPED | OUTPATIENT
Start: 2021-07-24 | End: 2021-08-03

## 2021-07-24 RX ADMIN — DICYCLOMINE HYDROCHLORIDE 20 MG: 10 CAPSULE ORAL at 02:27

## 2021-07-24 RX ADMIN — MORPHINE SULFATE 4 MG: 4 INJECTION INTRAVENOUS at 02:30

## 2021-07-24 RX ADMIN — IOPAMIDOL 100 ML: 612 INJECTION, SOLUTION INTRAVENOUS at 04:20

## 2021-07-24 RX ADMIN — ONDANSETRON 4 MG: 2 INJECTION INTRAMUSCULAR; INTRAVENOUS at 02:28

## 2021-07-24 NOTE — ED PROVIDER NOTES
EMERGENCY DEPARTMENT HISTORY AND PHYSICAL EXAM    Date: 7/24/2021  Patient Name: Mayito Malave    History of Presenting Illness     Chief Complaint   Patient presents with    Abdominal Pain         History Provided By: Patient    Additional History (Context):   1:51 AM  Mayito Malave is a 77 y.o. female with PMHX of diabetes, high blood pressure, liver disease who presents to the emergency department C/O abdominal pain. She states the pain is in the right upper quadrant started this morning she describes a has been intermittent and achy and feeling it in her right flank. It tends to get worse after she eats she describes it as a cramping feeling. She had she has had nausea vomiting diarrhea for the last 5 days. She is also had a headache for the last 2 days which she describes as throbbing disorder. She has no visual changes or arm or leg weakness. She denies any chest pain shortness of breath no urinary changes. Social History  She does not smoke drink alcohol or use drugs. She denies any use of any regular caffeine. Family History  Positive for sarcoidosis and mom and 1 sister. And father with diabetes      PCP: Jessica Rico MD    Current Outpatient Medications   Medication Sig Dispense Refill    metoclopramide HCl (Reglan) 10 mg tablet Take 1 Tablet by mouth every six (6) hours as needed for Nausea or Vomiting for up to 10 days. 20 Tablet 0    glycopyrrolate (RobinuL) 1 mg tablet Take 1 Tablet by mouth three (3) times daily as needed (gall bladder attacks). Indications: gall bladder attacks 20 Tablet 0    furosemide (LASIX) 40 mg tablet TAKE 1 TABLET EVERY DAY 90 Tab 3    spironolactone (ALDACTONE) 100 mg tablet TAKE 1 TABLET EVERY DAY 90 Tab 3    methocarbamoL (Robaxin) 500 mg tablet Take 2 Tabs by mouth three (3) times daily. 30 Tab 0    aspirin delayed-release 81 mg tablet Take  by mouth daily.       amLODIPine (NORVASC) 2.5 mg tablet       LANTUS SOLOSTAR 100 unit/mL (3 mL) inpn 26 Units daily. Past History     Past Medical History:  Past Medical History:   Diagnosis Date    Diabetes ([address]     Hypertension     Liver disease        Past Surgical History:  Past Surgical History:   Procedure Laterality Date    HX  SECTION         Family History:  History reviewed. No pertinent family history. Social History:  Social History     Tobacco Use    Smoking status: Never Smoker    Smokeless tobacco: Never Used   Vaping Use    Vaping Use: Never used   Substance Use Topics    Alcohol use: Yes     Comment: 2 Drinks every 2 months    Drug use: No       Allergies: Allergies   Allergen Reactions    Lisinopril Angioedema         Review of Systems   Review of Systems   Constitutional: Positive for appetite change and unexpected weight change (going up over the last yr). Eyes: Negative for visual disturbance. Gastrointestinal: Positive for abdominal distention, abdominal pain, diarrhea, nausea and vomiting. Negative for blood in stool and rectal pain. Genitourinary: Negative for difficulty urinating, dyspareunia, frequency and urgency. Neurological: Positive for light-headedness and headaches. Negative for dizziness, seizures, syncope, facial asymmetry, speech difficulty and numbness. Hematological: Does not bruise/bleed easily. All other systems reviewed and are negative. Physical Exam     Vitals:    21 2342 21 0357 21 0715   BP: (!) 198/69 (!) 155/42 (!) 149/79   Pulse: 84 82 84   Resp: 16 16 16   Temp: 98 °F (36.7 °C)     SpO2: 100% 98% 99%   Weight: 135.2 kg (298 lb)     Height: 5' 3\" (1.6 m)       Physical Exam  Vitals and nursing note reviewed. Constitutional:       General: She is not in acute distress. Appearance: She is well-developed. She is obese. She is not diaphoretic. HENT:      Head: Normocephalic and atraumatic. Eyes:      General: No scleral icterus. Extraocular Movements:      Right eye: Normal extraocular motion. Left eye: Normal extraocular motion. Conjunctiva/sclera: Conjunctivae normal.      Pupils: Pupils are equal, round, and reactive to light. Neck:      Trachea: No tracheal deviation. Cardiovascular:      Rate and Rhythm: Normal rate and regular rhythm. Heart sounds: Normal heart sounds. Pulmonary:      Effort: Pulmonary effort is normal. No respiratory distress. Breath sounds: Normal breath sounds. No stridor. Abdominal:      General: Abdomen is protuberant. Bowel sounds are normal. There is no distension. Palpations: Abdomen is soft. Tenderness: There is abdominal tenderness in the right upper quadrant and epigastric area. There is guarding. There is no rebound. Musculoskeletal:         General: No tenderness. Normal range of motion. Cervical back: Normal range of motion and neck supple. Comments: Grossly unremarkable without abnormalities   Skin:     General: Skin is warm and dry. Capillary Refill: Capillary refill takes less than 2 seconds. Findings: No erythema or rash. Neurological:      Mental Status: She is alert and oriented to person, place, and time. GCS: GCS eye subscore is 4. GCS verbal subscore is 5. GCS motor subscore is 6. Cranial Nerves: No cranial nerve deficit. Sensory: Sensation is intact. Motor: Motor function is intact. No weakness. Coordination: Coordination is intact. Gait: Gait is intact. Psychiatric:         Mood and Affect: Mood normal.         Behavior: Behavior normal.         Thought Content:  Thought content normal.         Judgment: Judgment normal.       Diagnostic Study Results     Labs -  Recent Results (from the past 24 hour(s))   CBC WITH AUTOMATED DIFF    Collection Time: 07/24/21  1:28 AM   Result Value Ref Range    WBC 8.3 4.6 - 13.2 K/uL    RBC 4.61 4.20 - 5.30 M/uL    HGB 12.6 12.0 - 16.0 g/dL    HCT 40.1 35.0 - 45.0 %    MCV 87.0 74.0 - 97.0 FL    MCH 27.3 24.0 - 34.0 PG    MCHC 31.4 31.0 - 37.0 g/dL    RDW 15.4 (H) 11.6 - 14.5 %    PLATELET 117 747 - 656 K/uL    MPV 10.6 9.2 - 11.8 FL    NEUTROPHILS 72 40 - 73 %    LYMPHOCYTES 17 (L) 21 - 52 %    MONOCYTES 8 3 - 10 %    EOSINOPHILS 3 0 - 5 %    BASOPHILS 0 0 - 2 %    ABS. NEUTROPHILS 6.0 1.8 - 8.0 K/UL    ABS. LYMPHOCYTES 1.4 0.9 - 3.6 K/UL    ABS. MONOCYTES 0.7 0.05 - 1.2 K/UL    ABS. EOSINOPHILS 0.2 0.0 - 0.4 K/UL    ABS. BASOPHILS 0.0 0.0 - 0.1 K/UL    DF AUTOMATED     CARDIAC PANEL,(CK, CKMB & TROPONIN)    Collection Time: 07/24/21  2:00 AM   Result Value Ref Range    CK - MB <1.0 <3.6 ng/ml    CK-MB Index  0.0 - 4.0 %     CALCULATION NOT PERFORMED WHEN RESULT IS BELOW LINEAR LIMIT    CK 56 26 - 192 U/L    Troponin-I, QT <0.02 0.0 - 8.531 NG/ML   METABOLIC PANEL, BASIC    Collection Time: 07/24/21  2:13 AM   Result Value Ref Range    Sodium 142 136 - 145 mmol/L    Potassium 4.0 3.5 - 5.5 mmol/L    Chloride 109 100 - 111 mmol/L    CO2 29 21 - 32 mmol/L    Anion gap 4 3.0 - 18 mmol/L    Glucose 184 (H) 74 - 99 mg/dL    BUN 12 7.0 - 18 MG/DL    Creatinine 0.67 0.6 - 1.3 MG/DL    BUN/Creatinine ratio 18 12 - 20      GFR est AA >60 >60 ml/min/1.73m2    GFR est non-AA >60 >60 ml/min/1.73m2    Calcium 8.7 8.5 - 10.1 MG/DL   HEPATIC FUNCTION PANEL    Collection Time: 07/24/21  2:13 AM   Result Value Ref Range    Protein, total 8.0 6.4 - 8.2 g/dL    Albumin 3.3 (L) 3.4 - 5.0 g/dL    Globulin 4.7 (H) 2.0 - 4.0 g/dL    A-G Ratio 0.7 (L) 0.8 - 1.7      Bilirubin, total 0.3 0.2 - 1.0 MG/DL    Bilirubin, direct <0.1 0.0 - 0.2 MG/DL    Alk. phosphatase 96 45 - 117 U/L    AST (SGOT) 13 10 - 38 U/L    ALT (SGPT) 9 (L) 13 - 56 U/L   LIPASE    Collection Time: 07/24/21  2:13 AM   Result Value Ref Range    Lipase 81 73 - 393 U/L         Radiologic Studies -   CT HEAD WO CONT   Final Result      No acute intracranial abnormalities. CT ABD PELV W CONT   Final Result      No acute intra-abdominal abnormality.       Cholelithiasis without evidence of cholecystitis Mild diverticulosis      XR CHEST PORT   Final Result      No active cardiopulmonary disease. CT Results  (Last 48 hours)    None        CXR Results  (Last 48 hours)    None          Medications given in the ED-  Medications   dicyclomine (BENTYL) capsule 20 mg (20 mg Oral Given 7/24/21 0227)   ondansetron (ZOFRAN) injection 4 mg (4 mg IntraVENous Given 7/24/21 0228)   morphine injection 4 mg (4 mg IntraVENous Given 7/24/21 0230)   iopamidoL (ISOVUE 300) 61 % contrast injection  mL (100 mL IntraVENous Given 7/24/21 0420)         Medical Decision Making   I am the first provider for this patient. I reviewed the vital signs, available nursing notes, past medical history, past surgical history, family history and social history. Vital Signs-Reviewed the patient's vital signs. Pulse Oximetry Analysis - 99% on room air    Cardiac Monitor:  Rate: 81 bpm  Rhythm: Sinus rhythm    EKG interpretation: (Preliminary)  2:01 AM   Normal sinus rhythm, rate 77, subtle Q waves in the septal leads otherwise unremarkable, QTC is 457. EKG read by Melissa Casey MD      Records Reviewed: NURSING NOTES AND PREVIOUS MEDICAL RECORDS    Provider Notes (Medical Decision Making):   Patient with right upper quadrant pain suspicious for gallstones. She does not have a surgical abdomen on examination but given her story and body habitus this is likely biliary disease. Possible but unlikely ACS pulmonary embolism urinary infection pancreatitis or enteritis or colitis. Her headache is likely associated with dehydration and fatigue after last couple of days of vomiting. Per request of the patient since we are scanning her belly we also looked in her head and found no acute process. We confirmed her biliary disease however panel she has no changes in her LFTs white count is not elevated and demonstrates no left shift. He was treated symptomatically normal follow-up with surgery.     Procedures:  Procedures    ED Course:   1:51 AM: Initial assessment performed. The patients presenting problems have been discussed, and they are in agreement with the care plan formulated and outlined with them. I have encouraged them to ask questions as they arise throughout their visit. Diagnosis and Disposition       DISCHARGE NOTE:  6:45 AM  Angeles Eddy  results have been reviewed with her. She has been counseled regarding her diagnosis, treatment, and plan. She verbally conveys understanding and agreement of the signs, symptoms, diagnosis, treatment and prognosis and additionally agrees to follow up as discussed. She also agrees with the care-plan and conveys that all of her questions have been answered. I have also provided discharge instructions for her that include: educational information regarding their diagnosis and treatment, and list of reasons why they would want to return to the ED prior to their follow-up appointment, should her condition change. She has been provided with education for proper emergency department utilization. CLINICAL IMPRESSION:    1. Acute non intractable tension-type headache    2. Abdominal pain, generalized    3. Calculus of gallbladder without cholecystitis without obstruction        PLAN:  1. D/C Home  2. Discharge Medication List as of 7/24/2021  7:01 AM      START taking these medications    Details   metoclopramide HCl (Reglan) 10 mg tablet Take 1 Tablet by mouth every six (6) hours as needed for Nausea or Vomiting for up to 10 days. , Normal, Disp-20 Tablet, R-0      glycopyrrolate (RobinuL) 1 mg tablet Take 1 Tablet by mouth three (3) times daily as needed (gall bladder attacks).  Indications: gall bladder attacks, Normal, Disp-20 Tablet, R-0         CONTINUE these medications which have NOT CHANGED    Details   furosemide (LASIX) 40 mg tablet TAKE 1 TABLET EVERY DAY, Normal, Disp-90 Tab, R-3      spironolactone (ALDACTONE) 100 mg tablet TAKE 1 TABLET EVERY DAY, Normal, Disp-90 Tab, R-3      methocarbamoL (Robaxin) 500 mg tablet Take 2 Tabs by mouth three (3) times daily. , Normal, Disp-30 Tab, R-0      aspirin delayed-release 81 mg tablet Take  by mouth daily. , Historical Med      amLODIPine (NORVASC) 2.5 mg tablet Historical Med      LANTUS SOLOSTAR 100 unit/mL (3 mL) inpn 26 Units daily. , Historical Med, [place]           3. Follow-up Information     Follow up With Specialties Details Why Contact Info    Wilda Harada, MD Family Medicine In 1 week  [address] [place] [address]  [phone]      Adan Mulligan MD Surgery   [address] [place] [address],[address] Floor  [phone]          _______________________________    This note was partially transcribed via voice recognition software. Although efforts have been made to catch any discrepancies, it may contain sound alike words, grammatical errors, or nonsensical words.

## 2021-07-24 NOTE — ED TRIAGE NOTES
Pt co RUQ pain that began today, describes as intermittent squeezing, rates 7/10, worsens w movement. Pt reports N/V/D V x 5, D x 5. Pt denies fever, CP, SOB, does endorse an intermittent Ha to her occiput, for the past 2 days, denies changes in vision, any new numbness to extremities, or dizziness.

## 2021-07-25 LAB
ATRIAL RATE: 78 BPM
CALCULATED R AXIS, ECG10: 5 DEGREES
CALCULATED T AXIS, ECG11: 25 DEGREES
DIAGNOSIS, 93000: NORMAL
Q-T INTERVAL, ECG07: 404 MS
QRS DURATION, ECG06: 76 MS
QTC CALCULATION (BEZET), ECG08: 457 MS
VENTRICULAR RATE, ECG03: 77 BPM

## 2021-08-22 ENCOUNTER — HOSPITAL ENCOUNTER (EMERGENCY)
Age: 66
Discharge: HOME | End: 2021-08-22
Attending: EMERGENCY MEDICINE
Payer: MEDICARE

## 2021-08-22 VITALS
TEMPERATURE: 99.9 F | HEART RATE: 73 BPM | DIASTOLIC BLOOD PRESSURE: 56 MMHG | SYSTOLIC BLOOD PRESSURE: 182 MMHG | WEIGHT: 293 LBS | HEIGHT: 61 IN | OXYGEN SATURATION: 100 % | BODY MASS INDEX: 55.32 KG/M2 | RESPIRATION RATE: 14 BRPM

## 2021-08-22 DIAGNOSIS — H10.9 CONJUNCTIVITIS OF BOTH EYES, UNSPECIFIED CONJUNCTIVITIS TYPE: Primary | ICD-10-CM

## 2021-08-22 DIAGNOSIS — R09.81 SINUS CONGESTION: ICD-10-CM

## 2021-08-22 PROCEDURE — 99283 EMERGENCY DEPT VISIT LOW MDM: CPT

## 2021-08-22 PROCEDURE — 74011250637 HC RX REV CODE- 250/637: Performed by: EMERGENCY MEDICINE

## 2021-08-22 RX ORDER — CETIRIZINE HCL 10 MG
10 TABLET ORAL
Status: COMPLETED | OUTPATIENT
Start: 2021-08-22 | End: 2021-08-22

## 2021-08-22 RX ORDER — CETIRIZINE HCL 10 MG
TABLET ORAL
Qty: 15 TABLET | Refills: 0 | Status: SHIPPED | OUTPATIENT
Start: 2021-08-22

## 2021-08-22 RX ORDER — AMOXICILLIN AND CLAVULANATE POTASSIUM 875; 125 MG/1; MG/1
1 TABLET, FILM COATED ORAL 2 TIMES DAILY
Qty: 20 TABLET | Refills: 0 | Status: SHIPPED | OUTPATIENT
Start: 2021-08-22 | End: 2021-09-01

## 2021-08-22 RX ORDER — CARBOXYMETHYLCELLULOSE SODIUM 10 MG/ML
1 GEL OPHTHALMIC
Qty: 15 ML | Refills: 0 | Status: SHIPPED | OUTPATIENT
Start: 2021-08-22

## 2021-08-22 RX ADMIN — CETIRIZINE HYDROCHLORIDE 10 MG: 10 TABLET, FILM COATED ORAL at 03:26

## 2021-08-30 NOTE — ED PROVIDER NOTES
EMERGENCY DEPARTMENT HISTORY AND PHYSICAL EXAM    Date: 8/22/2021  Patient Name: Ophelia Newton    History of Presenting Illness     Chief Complaint   Patient presents with    Allergic Reaction         History Provided By: Patient    Additional History (Context):   2:52 AM  Ophelia Newton is a 77 y.o. female with PMHX of obstructive sleep apnea uses CPAP, morbid obesity, diabetes, hypertension, autoimmune hepatitis, high cholesterol wheezing who presents to the emergency department C/O itchy watering eyes. Patient complains of increased nasal rhinorrhea along with itching is sore throat ear pain. She denies any fevers or chills or visual changes. She denies any long-term history of breath. She denies any sick contacts. Social History  Denies smoking drug or drugs    Family History  Positive for high blood pressure and diabetes      PCP: Kelsy Cope MD    Current Outpatient Medications   Medication Sig Dispense Refill    carboxymethylcellulose sodium (REFRESH LIQUIGEL) 1 % dlgl ophthalmic solution Administer 1 Drop to both eyes three (3) times daily as needed (DRY EYES). 15 mL 0    guaiFENesin-dextromethorphan SR (Mucinex DM) 600-30 mg per tablet Take 1 Tablet by mouth two (2) times a day. 30 Tablet 1    amoxicillin-clavulanate (Augmentin) 875-125 mg per tablet Take 1 Tablet by mouth two (2) times a day for 10 days. 20 Tablet 0    cetirizine (ZyrTEC) 10 mg tablet Take one tablets by mouth daily for 7 days and then as needed after that. Restart forseven days if sinus congestion recurs. 15 Tablet 0    furosemide (LASIX) 40 mg tablet TAKE 1 TABLET EVERY DAY 90 Tab 3    spironolactone (ALDACTONE) 100 mg tablet TAKE 1 TABLET EVERY DAY 90 Tab 3    methocarbamoL (Robaxin) 500 mg tablet Take 2 Tabs by mouth three (3) times daily. 30 Tab 0    aspirin delayed-release 81 mg tablet Take  by mouth daily.  amLODIPine (NORVASC) 2.5 mg tablet       LANTUS SOLOSTAR 100 unit/mL (3 mL) inpn 26 Units daily.  glycopyrrolate (RobinuL) 1 mg tablet Take 1 Tablet by mouth three (3) times daily as needed (gall bladder attacks). Indications: gall bladder attacks 20 Tablet 0       Past History     Past Medical History:  Past Medical History:   Diagnosis Date    Diabetes ([address]     Hypertension     Liver disease        Past Surgical History:  Past Surgical History:   Procedure Laterality Date    HX  SECTION         Family History:  No family history on file. Social History:  Social History     Tobacco Use    Smoking status: Never Smoker    Smokeless tobacco: Never Used   Vaping Use    Vaping Use: Never used   Substance Use Topics    Alcohol use: Yes     Comment: 2 Drinks every 2 months    Drug use: No       Allergies: Allergies   Allergen Reactions    Lisinopril Angioedema         Review of Systems   Review of Systems   Constitutional: Negative. HENT: Positive for congestion, postnasal drip, rhinorrhea and sore throat. Eyes: Positive for discharge and itching. Negative for photophobia, pain, redness and visual disturbance. Cardiovascular: Negative. Gastrointestinal: Negative. Endocrine: Negative. Genitourinary: Negative. Musculoskeletal: Negative. Skin: Negative. Allergic/Immunologic: Negative. Neurological: Negative. Hematological: Negative. Psychiatric/Behavioral: Negative. Physical Exam     Vitals:    21 0247   BP: (!) 182/56   Pulse: 73   Resp: 14   Temp: 99.9 °F (37.7 °C)   SpO2: 100%   Weight: 136.5 kg (301 lb)   Height: 5' 1\" (1.549 m)     Physical Exam  Vitals and nursing note reviewed. Constitutional:       General: She is not in acute distress. Appearance: She is well-developed and overweight. She is not ill-appearing, toxic-appearing or diaphoretic. HENT:      Head: Normocephalic and atraumatic. Nose: Nasal tenderness, mucosal edema, congestion and rhinorrhea present. Right Sinus: Maxillary sinus tenderness present.       Left Sinus: Maxillary sinus tenderness present. Eyes:      General: No scleral icterus. Extraocular Movements:      Right eye: Normal extraocular motion. Left eye: Normal extraocular motion. Conjunctiva/sclera:      Right eye: Right conjunctiva is injected. Left eye: Left conjunctiva is injected. Pupils: Pupils are equal, round, and reactive to light. Right eye: Pupil is round and reactive. Left eye: Pupil is round and reactive. Funduscopic exam:     Right eye: No papilledema. Left eye: No papilledema. Slit lamp exam:     Right eye: Anterior chamber quiet. No corneal flare, corneal ulcer, foreign body, hyphema, hypopyon, anterior chamber bulge, anterior chamber flares or photophobia. Left eye: Anterior chamber quiet. No corneal flare, corneal ulcer, foreign body, hyphema, hypopyon, anterior chamber bulge, anterior chamber flares or photophobia. Visual Fields: Right eye visual fields normal and left eye visual fields normal.   Neck:      Trachea: No tracheal deviation. Cardiovascular:      Rate and Rhythm: Normal rate and regular rhythm. Heart sounds: Normal heart sounds. Pulmonary:      Effort: Pulmonary effort is normal. No respiratory distress. Breath sounds: Normal breath sounds. No stridor. Abdominal:      General: Bowel sounds are normal. There is no distension. Palpations: Abdomen is soft. Tenderness: There is no abdominal tenderness. There is no rebound. Musculoskeletal:         General: No tenderness. Normal range of motion. Cervical back: Normal range of motion and neck supple. Comments: Grossly unremarkable without abnormalities   Lymphadenopathy:      Cervical: No cervical adenopathy. Skin:     General: Skin is warm and dry. Capillary Refill: Capillary refill takes less than 2 seconds. Findings: No erythema or rash.    Neurological:      Mental Status: She is alert and oriented to person, place, and time.      GCS: GCS eye subscore is 4. GCS verbal subscore is 5. GCS motor subscore is 6. Cranial Nerves: No cranial nerve deficit. Motor: No weakness. Psychiatric:         Mood and Affect: Mood normal.         Behavior: Behavior normal.         Thought Content: Thought content normal.         Judgment: Judgment normal.       Diagnostic Study Results     Labs -  No results found for this or any previous visit (from the past 24 hour(s)). Radiologic Studies -   No orders to display     CT Results  (Last 48 hours)    None        CXR Results  (Last 48 hours)    None          Medications given in the ED-  Medications   cetirizine (ZYRTEC) tablet 10 mg (10 mg Oral Given 8/22/21 0326)         Medical Decision Making   I am the first provider for this patient. I reviewed the vital signs, available nursing notes, past medical history, past surgical history, family history and social history. Vital Signs-Reviewed the patient's vital signs. Pulse Oximetry Analysis - 100% on room air    Records Reviewed: NURSING NOTES AND PREVIOUS MEDICAL RECORDS    Provider Notes (Medical Decision Making):   Patient reports itchy watering eyes which sounds allergy driven possibly viral.  She is an insulin-dependent diabetic and uses CPAP associated with sleep apnea and likely has dry eye associated with blowing air. Additionally this could help compress nasal secretions trick-or-treating puncta of the tear ducts retrograde causing infection. I gave her allergy medicines in the emergency room and discharged her with antibiotics as a precautionary measure. See no evidence of glaucoma central retinal artery or occlusion or macular changes. Is uncomfortable spots on exam.  Clinically she shows no evidence of mastoiditis. Procedures:  Procedures    ED Course:   2 2 AM : Initial assessment performed.  The patients presenting problems have been discussed, and they are in agreement with the care plan formulated and outlined with them. I have encouraged them to ask questions as they arise throughout their visit. Diagnosis and Disposition       DISCHARGE NOTE:  3:15 AM  eHlen Pacheco  results have been reviewed with her. She has been counseled regarding her diagnosis, treatment, and plan. She verbally conveys understanding and agreement of the signs, symptoms, diagnosis, treatment and prognosis and additionally agrees to follow up as discussed. She also agrees with the care-plan and conveys that all of her questions have been answered. I have also provided discharge instructions for her that include: educational information regarding their diagnosis and treatment, and list of reasons why they would want to return to the ED prior to their follow-up appointment, should her condition change. She has been provided with education for proper emergency department utilization. CLINICAL IMPRESSION:    1. Conjunctivitis of both eyes, unspecified conjunctivitis type    2. Sinus congestion        PLAN:  1. D/C Home  2. Discharge Medication List as of 8/22/2021  3:14 AM      START taking these medications    Details   carboxymethylcellulose sodium (REFRESH LIQUIGEL) 1 % dlgl ophthalmic solution Administer 1 Drop to both eyes three (3) times daily as needed (DRY EYES). , Normal, Disp-15 mL, R-0      guaiFENesin-dextromethorphan SR (Mucinex DM) 600-30 mg per tablet Take 1 Tablet by mouth two (2) times a day., Normal, Disp-30 Tablet, R-1      amoxicillin-clavulanate (Augmentin) 875-125 mg per tablet Take 1 Tablet by mouth two (2) times a day for 10 days. , Normal, Disp-20 Tablet, R-0      cetirizine (ZyrTEC) 10 mg tablet Take one tablets by mouth daily for 7 days and then as needed after that. Restart forseven days if sinus congestion recurs. , Normal, Disp-15 Tablet, R-0         CONTINUE these medications which have NOT CHANGED    Details   furosemide (LASIX) 40 mg tablet TAKE 1 TABLET EVERY DAY, Normal, Disp-90 Tab, R-3 spironolactone (ALDACTONE) 100 mg tablet TAKE 1 TABLET EVERY DAY, Normal, Disp-90 Tab, R-3      methocarbamoL (Robaxin) 500 mg tablet Take 2 Tabs by mouth three (3) times daily. , Normal, Disp-30 Tab, R-0      aspirin delayed-release 81 mg tablet Take  by mouth daily. , Historical Med      amLODIPine (NORVASC) 2.5 mg tablet Historical Med      LANTUS SOLOSTAR 100 unit/mL (3 mL) inpn 26 Units daily. , Historical Med, [place]      glycopyrrolate (RobinuL) 1 mg tablet Take 1 Tablet by mouth three (3) times daily as needed (gall bladder attacks). Indications: gall bladder attacks, Normal, Disp-20 Tablet, R-0           3. Follow-up Information     Follow up With Specialties Details Why Contact Info    Oscar Aranda MD Family Medicine   [address] [place] [address]  [address]  [phone]          _______________________________    This note was partially transcribed via voice recognition software. Although efforts have been made to catch any discrepancies, it may contain sound alike words, grammatical errors, or nonsensical words.

## 2021-11-23 ENCOUNTER — HOSPITAL ENCOUNTER (OUTPATIENT)
Dept: LAB | Age: 66
Discharge: HOME | End: 2021-11-23
Payer: MEDICARE

## 2021-11-23 ENCOUNTER — OFFICE VISIT (OUTPATIENT)
Dept: HEMATOLOGY | Age: 66
End: 2021-11-23
Payer: MEDICARE

## 2021-11-23 VITALS
OXYGEN SATURATION: 99 % | HEART RATE: 72 BPM | HEIGHT: 61 IN | TEMPERATURE: 98.6 F | RESPIRATION RATE: 18 BRPM | WEIGHT: 293 LBS | SYSTOLIC BLOOD PRESSURE: 180 MMHG | BODY MASS INDEX: 55.32 KG/M2 | DIASTOLIC BLOOD PRESSURE: 84 MMHG

## 2021-11-23 DIAGNOSIS — K75.4 AUTOIMMUNE HEPATITIS (HCC): Primary | ICD-10-CM

## 2021-11-23 DIAGNOSIS — K75.4 AUTOIMMUNE HEPATITIS (HCC): ICD-10-CM

## 2021-11-23 LAB
ALBUMIN SERPL-MCNC: 3.2 G/DL (ref 3.4–5)
ALBUMIN/GLOB SERPL: 0.7 {RATIO} (ref 0.8–1.7)
ALP SERPL-CCNC: 86 U/L (ref 45–117)
ALT SERPL-CCNC: 12 U/L (ref 13–56)
ANION GAP SERPL CALC-SCNC: 4 MMOL/L (ref 3–18)
AST SERPL-CCNC: 9 U/L (ref 10–38)
BASOPHILS # BLD: 0 K/UL (ref 0–0.1)
BASOPHILS NFR BLD: 1 % (ref 0–2)
BILIRUB DIRECT SERPL-MCNC: 0.2 MG/DL (ref 0–0.2)
BILIRUB SERPL-MCNC: 0.4 MG/DL (ref 0.2–1)
BUN SERPL-MCNC: 12 MG/DL (ref 7–18)
BUN/CREAT SERPL: 17 (ref 12–20)
CALCIUM SERPL-MCNC: 8.5 MG/DL (ref 8.5–10.1)
CHLORIDE SERPL-SCNC: 107 MMOL/L (ref 100–111)
CO2 SERPL-SCNC: 30 MMOL/L (ref 21–32)
CREAT SERPL-MCNC: 0.71 MG/DL (ref 0.6–1.3)
DIFFERENTIAL METHOD BLD: ABNORMAL
EOSINOPHIL # BLD: 0.3 K/UL (ref 0–0.4)
EOSINOPHIL NFR BLD: 4 % (ref 0–5)
ERYTHROCYTE [DISTWIDTH] IN BLOOD BY AUTOMATED COUNT: 15.5 % (ref 11.6–14.5)
GLOBULIN SER CALC-MCNC: 4.8 G/DL (ref 2–4)
GLUCOSE SERPL-MCNC: 113 MG/DL (ref 74–99)
HCT VFR BLD AUTO: 37.6 % (ref 35–45)
HGB BLD-MCNC: 11.5 G/DL (ref 12–16)
IMM GRANULOCYTES # BLD AUTO: 0.1 K/UL (ref 0–0.04)
IMM GRANULOCYTES NFR BLD AUTO: 1 % (ref 0–0.5)
LYMPHOCYTES # BLD: 2.5 K/UL (ref 0.9–3.6)
LYMPHOCYTES NFR BLD: 35 % (ref 21–52)
MCH RBC QN AUTO: 27.3 PG (ref 24–34)
MCHC RBC AUTO-ENTMCNC: 30.6 G/DL (ref 31–37)
MCV RBC AUTO: 89.3 FL (ref 78–100)
MONOCYTES # BLD: 0.6 K/UL (ref 0.05–1.2)
MONOCYTES NFR BLD: 8 % (ref 3–10)
NEUTS SEG # BLD: 3.7 K/UL (ref 1.8–8)
NEUTS SEG NFR BLD: 52 % (ref 40–73)
NRBC # BLD: 0 K/UL (ref 0–0.01)
NRBC BLD-RTO: 0 PER 100 WBC
PLATELET # BLD AUTO: 295 K/UL (ref 135–420)
PMV BLD AUTO: 9.9 FL (ref 9.2–11.8)
POTASSIUM SERPL-SCNC: 3.8 MMOL/L (ref 3.5–5.5)
PROT SERPL-MCNC: 8 G/DL (ref 6.4–8.2)
RBC # BLD AUTO: 4.21 M/UL (ref 4.2–5.3)
SODIUM SERPL-SCNC: 141 MMOL/L (ref 136–145)
WBC # BLD AUTO: 7.1 K/UL (ref 4.6–13.2)

## 2021-11-23 PROCEDURE — G8427 DOCREV CUR MEDS BY ELIG CLIN: HCPCS | Performed by: NURSE PRACTITIONER

## 2021-11-23 PROCEDURE — 3017F COLORECTAL CA SCREEN DOC REV: CPT | Performed by: NURSE PRACTITIONER

## 2021-11-23 PROCEDURE — G8536 NO DOC ELDER MAL SCRN: HCPCS | Performed by: NURSE PRACTITIONER

## 2021-11-23 PROCEDURE — 1101F PT FALLS ASSESS-DOCD LE1/YR: CPT | Performed by: NURSE PRACTITIONER

## 2021-11-23 PROCEDURE — 85025 COMPLETE CBC W/AUTO DIFF WBC: CPT

## 2021-11-23 PROCEDURE — 80076 HEPATIC FUNCTION PANEL: CPT

## 2021-11-23 PROCEDURE — G8417 CALC BMI ABV UP PARAM F/U: HCPCS | Performed by: NURSE PRACTITIONER

## 2021-11-23 PROCEDURE — G8400 PT W/DXA NO RESULTS DOC: HCPCS | Performed by: NURSE PRACTITIONER

## 2021-11-23 PROCEDURE — G8754 DIAS BP LESS 90: HCPCS | Performed by: NURSE PRACTITIONER

## 2021-11-23 PROCEDURE — 36415 COLL VENOUS BLD VENIPUNCTURE: CPT

## 2021-11-23 PROCEDURE — 1090F PRES/ABSN URINE INCON ASSESS: CPT | Performed by: NURSE PRACTITIONER

## 2021-11-23 PROCEDURE — G8753 SYS BP > OR = 140: HCPCS | Performed by: NURSE PRACTITIONER

## 2021-11-23 PROCEDURE — 80048 BASIC METABOLIC PNL TOTAL CA: CPT

## 2021-11-23 PROCEDURE — 99213 OFFICE O/P EST LOW 20 MIN: CPT | Performed by: NURSE PRACTITIONER

## 2021-11-23 PROCEDURE — G8432 DEP SCR NOT DOC, RNG: HCPCS | Performed by: NURSE PRACTITIONER

## 2021-11-23 NOTE — PROGRESS NOTES
[address] [facility]      Yuriy Garcia MD, Justyn Kaplan, Brady Rodriguez MD, MPH      Vandana Isaac, EROS Gonzalez, [facility]-BC     Marlene CRAVEN Debbie, [facility]   Margaeugenia Horn, P-C    Mendoza Emmanuel, [facility]       Megtigist [facility] [address] 136    at [address], [address] [place] [address], [facility] 22.    [phone]    FAX: [address] [place] [address] Drive, [address], [address]    [phone]    FAX: [phone]       Patient Care Team:  Flores Person MD as PCP - General (Family Medicine)  Sheri Farooq MD (Gastroenterology)      Problem List  Date Reviewed: 4/6/2021          Codes Class Noted    Obesity, morbid [facility]) ICD-10-CM: E66.01  ICD-9-CM: 278.01  2/15/2018        Autoimmune hepatitis ([facility] 75.) ICD-10-CM: K75.4  ICD-9-CM: 571.42  5/30/2017        Diabetes mellitus, type 2 ([facility] ICD-10-CM: E11.9  ICD-9-CM: 250.00  5/30/2017        Edema ICD-10-CM: R60.9  ICD-9-CM: 782.3  5/30/2017        Hypertension ICD-10-CM: I10  ICD-9-CM: 401.9  5/30/2017        Hypercholesterolemia ICD-10-CM: E78.00  ICD-9-CM: 272.0  5/30/2017              Mike Hawk returns to the [address] for management of autoimmune hepatitis. The active problem list, all pertinent past medical history, medications, and laboratory findings related to the liver disorder were reviewed with the patient. The patient is a 74 y.o. female who was first noted to have abnormalities in liver transaminases and alkaline phosphase in 3/2017. Further evaluation of these abnormalities has included serologic studies which were positive for ASMA    Ultrasound of the liver was performed in 3/2017. This demonstrated slightly increased echogenicity. A liver biopsy was performed in 3/2017. This demonstrated severe inflammation and portal fibrosis. The patient has no symptoms which can be attributed to the liver disorder. The patient has not experienced pain in the right side over the liver, yellowing of the eyes or skin, problems concentrating. The patient has moderate limitations in functional activities which can be attributed to other medical problems that are not related to the liver disease. ALLERGIES  Allergies   Allergen Reactions    Lisinopril Angioedema       MEDICATIONS  Current Outpatient Medications   Medication Sig    carboxymethylcellulose sodium (REFRESH LIQUIGEL) 1 % dlgl ophthalmic solution Administer 1 Drop to both eyes three (3) times daily as needed (DRY EYES).  guaiFENesin-dextromethorphan SR (Mucinex DM) 600-30 mg per tablet Take 1 Tablet by mouth two (2) times a day.  cetirizine (ZyrTEC) 10 mg tablet Take one tablets by mouth daily for 7 days and then as needed after that. Restart forseven days if sinus congestion recurs.  glycopyrrolate (RobinuL) 1 mg tablet Take 1 Tablet by mouth three (3) times daily as needed (gall bladder attacks). Indications: gall bladder attacks    furosemide (LASIX) 40 mg tablet TAKE 1 TABLET EVERY DAY    spironolactone (ALDACTONE) 100 mg tablet TAKE 1 TABLET EVERY DAY    methocarbamoL (Robaxin) 500 mg tablet Take 2 Tabs by mouth three (3) times daily.  aspirin delayed-release 81 mg tablet Take  by mouth daily.  amLODIPine (NORVASC) 2.5 mg tablet     LANTUS SOLOSTAR 100 unit/mL (3 mL) inpn 26 Units daily. No current facility-administered medications for this visit. SYSTEM REVIEW NOT RELATED TO LIVER DISEASE OR REVIEWED ABOVE:  Constitution systems: morbid obesity  Eyes: Negative for visual changes. ENT: Negative for sore throat, painful swallowing. Respiratory: Negative for cough, hemoptysis, SOB. Cardiology: Negative for chest pain, palpitations. GI:  Negative for constipation or diarrhea.   : Negative for urinary frequency, dysuria, hematuria, nocturia. Skin: Negative for rash. Hematology: Negative for easy bruising, blood clots. Musculo-skelatal: Arthritis of knees. Neurologic: Negative for headaches, dizziness, vertigo, memory problems not related to HE. Psychology: Negative for anxiety, depression. FAMILY HISTORY:  The father  of DM. The mother  of Sarcoidosis. There is no family history of liver disease. There is no family history of immune disorders. SOCIAL HISTORY:  The patient is . The patient has 2 children, and 7 grandchildren. The patient has never used tobacco products. The patient consumes alcohol on social occasions never in excess. The patient has been abstinent from alcohol since 3/2017. The patient used to work in [employer] for children. The patient has not worked since 2015. PHYSICAL EXAM:    Visit Vitals  BP (!) 180/84   Pulse 72   Temp 98.6 °F (37 °C)   Resp 18   Ht 5' 1\" (1.549 m)   Wt 314 lb (142.4 kg)   SpO2 99%   BMI 59.33 kg/m²     General: No acute distress. Eyes: Sclera anicteric. ENT: No oral lesions. Thyroid normal.  Nodes: No adenopathy. Skin: No spider angiomata. No jaundice. No palmar erythema. Respiratory: Lungs clear to auscultation. Cardiovascular: Regular heart rate. No murmurs. No JVD. Abdomen: Soft non-tender. Liver size normal to percussion/palpation. Spleen not palpable. No obvious ascites. Extremities: No edema. No muscle wasting. No gross arthritic changes. Neurologic: Alert and oriented. Cranial nerves grossly intact. No asterixis.     LABORATORY STUDIES:  Liver [place] [address] [place] [address] Units 2021 3/24/2021   WBC 4.6 - 13.2 K/uL 8.3 6.5   ANC 1.8 - 8.0 K/UL 6.0 3.6   HGB 12.0 - 16.0 g/dL 12.6 12.5    - 420 K/uL 267 263   INR 0.8 - 1.2    1.0   AST 10 - 38 U/L 13 13   ALT 13 - 56 U/L 9 (L) 13   Alk Phos 45 - 117 U/L 96 89   Bili, Total 0.2 - 1.0 MG/DL 0.3 0.3   Bili, Direct 0.0 - 0.2 MG/DL <0.1    Albumin 3.4 - 5.0 g/dL 3.3 (L) 3.2 (L)   BUN 7.0 - 18 MG/DL 12 9   Creat 0.6 - 1.3 MG/DL 0.67 0.69   Na 136 - 145 mmol/L 142 139   K 3.5 - 5.5 mmol/L 4.0 4.3   Cl 100 - 111 mmol/L 109 106   CO2 21 - 32 mmol/L 29 29   Glucose 74 - 99 mg/dL 184 (H) 104 (H)     TACROLIMUS LEVEL:  Liver Virology and Transplant Immune Suppression Latest Ref Rng & Units 11/21/2019   Tacrolimus Level 2.0 - 20.0 ng/mL None detected     Liver Virology and Transplant Immune Suppression Latest Ref Rng & Units 5/20/2019   Tacrolimus Level 2.0 - 20.0 ng/mL None detected       SEROLOGIES:  3/2017. HBsAntigen negative, anti-HCV negative, Ferritin 239, iron saturation 11%, KAYLEE negative, ASMA positive 1:320,     LIVER HISTOLOGY:  3/2017. Severe active hepatitis with PMN and portal fibrosis. Biopsy slides not reviewed by MLS.  5/2018. Shear wave elastography performed at THE [facility]. EkPa was E Range: 4.24-9.41 kPa, E Mean: 6.39 kPa, E Median: 6.10 kPa, E Std: 1.54 kPa. The results suggested a fibrosis level of F1.  11/2019. Shear wave elastography performed at THE [facility]. EkPa was E Range: 10.32 kPa, E Mean: 12.49 kPa, E Median: 13.02 kPa, E Std: 5.68 kPa. The results suggested a fibrosis level of F4. The high IQR% suggests that the measurement may not be reliable. 6/2020. Shear wave elastography performed at THE [facility]. EkPa was E Range: 12.9-19.96 kPa, E Mean: 15.98 kPa, E Median: 15.25 kPa, E Std: 2.66 kPa. The results suggested a fibrosis level of F4. The high IQR% suggests that the measurement may not be reliable. ENDOSCOPIC PROCEDURES:  Not available or performed    RADIOLOGY:  3/2017. Ultrasound of liver. Mild increased echogenicity. No liver mass lesions. No dilated bile ducts. No ascites. 5/2018. Ultrasound of the liver. Mild increased echogenicity. No liver mass lesions. No dilated bile ducts. No ascites. 6/2019. Ultrasound of the liver. Mild increased echogenicity. No liver mass lesions.  No dilated bile ducts. No ascites. 6/2020. Ultrasound of liver. Echogenic consistent with chronic liver disease. No liver mass lesions. No dilated bile ducts. No ascites. OTHER TESTING:  Not available or performed    ASSESSMENT AND PLAN:  Autoimmune Hepatitis with portal fibrosis    Liver transaminases are normal.  Alkaline phosphate is normal.  Liver function is normal.  The platelet count is normal.    The patient had been prescribed treatment with Tacrolimus 1 mg twice daily. She is no longer on medication. Discussed we will continue to monitor labs for possible flair of AIH. The patient was directed to continue all other medications at the current dosages. There are no contraindications for the patient to take any medications that are necessary for treatment of other medical issues. The need to assess liver fibrosis was discussed. Shear wave elastography can assess liver fibrosis and determine if a patient has advanced fibrosis or cirrhosis without the need for liver biopsy. Due to body habitus Fibroscan/elastography is not an accurate indicator. The patient was counseled regarding alcohol consumption. Vaccination for viral hepatitis A and B is recommended since the patient has no serologic evidence of previous exposure or vaccination with immunity. Will perform laboratory testing to monitor liver function and degree of liver injury. This included BMP, hepatic panel, CBC with platelet count. FOLLOW-UP:  All of the issues listed above in the Assessment and Plan were discussed with the patient. All questions were answered. The patient expressed a clear understanding of the above. [address] [facility] 87 in 6 months for routine monitoring.        JULIA Hinton-BC  [address] Liver [place] of [place] [address]  [facility], [address] [place] [address] - [address]  [phone]

## 2022-03-18 PROBLEM — I10 HYPERTENSION: Status: ACTIVE | Noted: 2017-05-30

## 2022-03-19 PROBLEM — R60.9 EDEMA: Status: ACTIVE | Noted: 2017-05-30

## 2022-03-19 PROBLEM — E66.01 OBESITY, MORBID (HCC): Status: ACTIVE | Noted: 2018-02-15

## 2022-03-19 PROBLEM — K75.4 AUTOIMMUNE HEPATITIS (HCC): Status: ACTIVE | Noted: 2017-05-30

## 2022-03-19 PROBLEM — E11.9 DIABETES MELLITUS, TYPE 2 (HCC): Status: ACTIVE | Noted: 2017-05-30

## 2022-03-20 PROBLEM — E78.00 HYPERCHOLESTEROLEMIA: Status: ACTIVE | Noted: 2017-05-30

## 2022-03-30 ENCOUNTER — APPOINTMENT (OUTPATIENT)
Dept: GENERAL RADIOLOGY | Age: 67
End: 2022-03-30
Attending: PHYSICIAN ASSISTANT
Payer: MEDICARE

## 2022-03-30 ENCOUNTER — APPOINTMENT (OUTPATIENT)
Dept: VASCULAR SURGERY | Age: 67
End: 2022-03-30
Attending: PHYSICIAN ASSISTANT
Payer: MEDICARE

## 2022-03-30 ENCOUNTER — HOSPITAL ENCOUNTER (EMERGENCY)
Age: 67
Discharge: HOME | End: 2022-03-30
Attending: EMERGENCY MEDICINE
Payer: MEDICARE

## 2022-03-30 VITALS
HEIGHT: 63 IN | RESPIRATION RATE: 16 BRPM | WEIGHT: 293 LBS | SYSTOLIC BLOOD PRESSURE: 138 MMHG | TEMPERATURE: 97.9 F | HEART RATE: 80 BPM | BODY MASS INDEX: 51.91 KG/M2 | OXYGEN SATURATION: 100 % | DIASTOLIC BLOOD PRESSURE: 55 MMHG

## 2022-03-30 DIAGNOSIS — E87.6 HYPOKALEMIA: ICD-10-CM

## 2022-03-30 DIAGNOSIS — R60.0 LOWER EXTREMITY EDEMA: Primary | ICD-10-CM

## 2022-03-30 LAB
ALBUMIN SERPL-MCNC: 3.3 G/DL (ref 3.4–5)
ALBUMIN/GLOB SERPL: 0.7 {RATIO} (ref 0.8–1.7)
ALP SERPL-CCNC: 112 U/L (ref 45–117)
ALT SERPL-CCNC: 11 U/L (ref 13–56)
ANION GAP SERPL CALC-SCNC: 4 MMOL/L (ref 3–18)
APTT PPP: 20.9 SEC (ref 23–36.4)
AST SERPL-CCNC: 8 U/L (ref 10–38)
BASOPHILS # BLD: 0 K/UL (ref 0–0.1)
BASOPHILS NFR BLD: 1 % (ref 0–2)
BILIRUB SERPL-MCNC: 0.4 MG/DL (ref 0.2–1)
BNP SERPL-MCNC: 259 PG/ML (ref 0–900)
BUN SERPL-MCNC: 12 MG/DL (ref 7–18)
BUN/CREAT SERPL: 17 (ref 12–20)
CALCIUM SERPL-MCNC: 8.5 MG/DL (ref 8.5–10.1)
CHLORIDE SERPL-SCNC: 106 MMOL/L (ref 100–111)
CO2 SERPL-SCNC: 31 MMOL/L (ref 21–32)
CREAT SERPL-MCNC: 0.72 MG/DL (ref 0.6–1.3)
DIFFERENTIAL METHOD BLD: ABNORMAL
EOSINOPHIL # BLD: 0.3 K/UL (ref 0–0.4)
EOSINOPHIL NFR BLD: 4 % (ref 0–5)
ERYTHROCYTE [DISTWIDTH] IN BLOOD BY AUTOMATED COUNT: 15 % (ref 11.6–14.5)
GLOBULIN SER CALC-MCNC: 5 G/DL (ref 2–4)
GLUCOSE SERPL-MCNC: 135 MG/DL (ref 74–99)
HCT VFR BLD AUTO: 37.4 % (ref 35–45)
HGB BLD-MCNC: 12.2 G/DL (ref 12–16)
IMM GRANULOCYTES # BLD AUTO: 0 K/UL (ref 0–0.04)
IMM GRANULOCYTES NFR BLD AUTO: 0 % (ref 0–0.5)
INR PPP: 1.1 (ref 0.8–1.2)
LYMPHOCYTES # BLD: 1.6 K/UL (ref 0.9–3.6)
LYMPHOCYTES NFR BLD: 23 % (ref 21–52)
MAGNESIUM SERPL-MCNC: 1.8 MG/DL (ref 1.6–2.6)
MCH RBC QN AUTO: 28.1 PG (ref 24–34)
MCHC RBC AUTO-ENTMCNC: 32.6 G/DL (ref 31–37)
MCV RBC AUTO: 86.2 FL (ref 78–100)
MONOCYTES # BLD: 0.5 K/UL (ref 0.05–1.2)
MONOCYTES NFR BLD: 7 % (ref 3–10)
NEUTS SEG # BLD: 4.6 K/UL (ref 1.8–8)
NEUTS SEG NFR BLD: 65 % (ref 40–73)
NRBC # BLD: 0 K/UL (ref 0–0.01)
NRBC BLD-RTO: 0 PER 100 WBC
PLATELET # BLD AUTO: 270 K/UL (ref 135–420)
PMV BLD AUTO: 9.8 FL (ref 9.2–11.8)
POTASSIUM SERPL-SCNC: 2.9 MMOL/L (ref 3.5–5.5)
PROT SERPL-MCNC: 8.3 G/DL (ref 6.4–8.2)
PROTHROMBIN TIME: 13.3 SEC (ref 11.5–15.2)
RBC # BLD AUTO: 4.34 M/UL (ref 4.2–5.3)
SODIUM SERPL-SCNC: 141 MMOL/L (ref 136–145)
TROPONIN-HIGH SENSITIVITY: 4 NG/L (ref 0–54)
WBC # BLD AUTO: 7 K/UL (ref 4.6–13.2)

## 2022-03-30 PROCEDURE — 74011250637 HC RX REV CODE- 250/637: Performed by: PHYSICIAN ASSISTANT

## 2022-03-30 PROCEDURE — 80053 COMPREHEN METABOLIC PANEL: CPT

## 2022-03-30 PROCEDURE — 93970 EXTREMITY STUDY: CPT

## 2022-03-30 PROCEDURE — 85610 PROTHROMBIN TIME: CPT

## 2022-03-30 PROCEDURE — 85730 THROMBOPLASTIN TIME PARTIAL: CPT

## 2022-03-30 PROCEDURE — 84484 ASSAY OF TROPONIN QUANT: CPT

## 2022-03-30 PROCEDURE — 85025 COMPLETE CBC W/AUTO DIFF WBC: CPT

## 2022-03-30 PROCEDURE — 71045 X-RAY EXAM CHEST 1 VIEW: CPT

## 2022-03-30 PROCEDURE — 96365 THER/PROPH/DIAG IV INF INIT: CPT

## 2022-03-30 PROCEDURE — 96360 HYDRATION IV INFUSION INIT: CPT

## 2022-03-30 PROCEDURE — 74011250636 HC RX REV CODE- 250/636: Performed by: PHYSICIAN ASSISTANT

## 2022-03-30 PROCEDURE — 93005 ELECTROCARDIOGRAM TRACING: CPT

## 2022-03-30 PROCEDURE — 83880 ASSAY OF NATRIURETIC PEPTIDE: CPT

## 2022-03-30 PROCEDURE — 83735 ASSAY OF MAGNESIUM: CPT

## 2022-03-30 PROCEDURE — 99285 EMERGENCY DEPT VISIT HI MDM: CPT

## 2022-03-30 RX ORDER — POTASSIUM CHLORIDE 7.45 MG/ML
10 INJECTION INTRAVENOUS
Status: COMPLETED | OUTPATIENT
Start: 2022-03-30 | End: 2022-03-30

## 2022-03-30 RX ORDER — POTASSIUM CHLORIDE 7.45 MG/ML
10 INJECTION INTRAVENOUS
Status: DISCONTINUED | OUTPATIENT
Start: 2022-03-30 | End: 2022-03-31 | Stop reason: HOSPADM

## 2022-03-30 RX ORDER — NAPROXEN 250 MG/1
375 TABLET ORAL
Status: COMPLETED | OUTPATIENT
Start: 2022-03-30 | End: 2022-03-30

## 2022-03-30 RX ADMIN — POTASSIUM CHLORIDE 10 MEQ: 7.46 INJECTION, SOLUTION INTRAVENOUS at 18:47

## 2022-03-30 RX ADMIN — POTASSIUM BICARBONATE 40 MEQ: 782 TABLET, EFFERVESCENT ORAL at 19:42

## 2022-03-30 RX ADMIN — NAPROXEN 375 MG: 250 TABLET ORAL at 18:13

## 2022-03-30 NOTE — ED TRIAGE NOTES
Pt arrives to ED with c\o LEFT leg swelling and pain, pt also endorses some leg swelling in RLE, pt endorses pain in BLE

## 2022-03-31 LAB
ATRIAL RATE: 78 BPM
CALCULATED P AXIS, ECG09: 46 DEGREES
CALCULATED R AXIS, ECG10: 8 DEGREES
CALCULATED T AXIS, ECG11: 18 DEGREES
DIAGNOSIS, 93000: NORMAL
P-R INTERVAL, ECG05: 166 MS
Q-T INTERVAL, ECG07: 406 MS
QRS DURATION, ECG06: 84 MS
QTC CALCULATION (BEZET), ECG08: 462 MS
VENTRICULAR RATE, ECG03: 78 BPM

## 2022-08-03 ENCOUNTER — HOSPITAL ENCOUNTER (OUTPATIENT)
Dept: LAB | Age: 67
Discharge: HOME | End: 2022-08-03

## 2022-08-03 ENCOUNTER — OFFICE VISIT (OUTPATIENT)
Dept: HEMATOLOGY | Age: 67
End: 2022-08-03
Payer: MEDICARE

## 2022-08-03 VITALS
TEMPERATURE: 97.4 F | WEIGHT: 293 LBS | HEART RATE: 69 BPM | BODY MASS INDEX: 55.32 KG/M2 | OXYGEN SATURATION: 98 % | HEIGHT: 61 IN | DIASTOLIC BLOOD PRESSURE: 65 MMHG | SYSTOLIC BLOOD PRESSURE: 190 MMHG

## 2022-08-03 DIAGNOSIS — K75.4 AUTOIMMUNE HEPATITIS (HCC): Primary | ICD-10-CM

## 2022-08-03 LAB — XX-LABCORP SPECIMEN COL,LCBCF: NORMAL

## 2022-08-03 PROCEDURE — G8400 PT W/DXA NO RESULTS DOC: HCPCS | Performed by: NURSE PRACTITIONER

## 2022-08-03 PROCEDURE — 1123F ACP DISCUSS/DSCN MKR DOCD: CPT | Performed by: NURSE PRACTITIONER

## 2022-08-03 PROCEDURE — G8432 DEP SCR NOT DOC, RNG: HCPCS | Performed by: NURSE PRACTITIONER

## 2022-08-03 PROCEDURE — 1101F PT FALLS ASSESS-DOCD LE1/YR: CPT | Performed by: NURSE PRACTITIONER

## 2022-08-03 PROCEDURE — G8536 NO DOC ELDER MAL SCRN: HCPCS | Performed by: NURSE PRACTITIONER

## 2022-08-03 PROCEDURE — 3017F COLORECTAL CA SCREEN DOC REV: CPT | Performed by: NURSE PRACTITIONER

## 2022-08-03 PROCEDURE — G8753 SYS BP > OR = 140: HCPCS | Performed by: NURSE PRACTITIONER

## 2022-08-03 PROCEDURE — 1090F PRES/ABSN URINE INCON ASSESS: CPT | Performed by: NURSE PRACTITIONER

## 2022-08-03 PROCEDURE — G8417 CALC BMI ABV UP PARAM F/U: HCPCS | Performed by: NURSE PRACTITIONER

## 2022-08-03 PROCEDURE — G8754 DIAS BP LESS 90: HCPCS | Performed by: NURSE PRACTITIONER

## 2022-08-03 PROCEDURE — 99001 SPECIMEN HANDLING PT-LAB: CPT

## 2022-08-03 PROCEDURE — G8427 DOCREV CUR MEDS BY ELIG CLIN: HCPCS | Performed by: NURSE PRACTITIONER

## 2022-08-03 PROCEDURE — 99213 OFFICE O/P EST LOW 20 MIN: CPT | Performed by: NURSE PRACTITIONER

## 2022-08-03 RX ORDER — ATORVASTATIN CALCIUM 40 MG/1
80 TABLET, FILM COATED ORAL DAILY
COMMUNITY
Start: 2022-05-01

## 2022-08-03 NOTE — PROGRESS NOTES
[address] [facility], [place], [address], [place], [place]      EROS Romero, [facility]-BC     Marlene Lewis, [facility]   RADHAMES Gloria, [facility]       [facility] 136    at [address], [address]  22.    [phone]    FAX: [address] [place] [address], [address]    [phone]    FAX: [phone]     Patient Care Team:  Bess Das MD as PCP - General (Family Medicine)  Milena Cole MD (Gastroenterology)      Problem List  Date Reviewed: 11/23/2021            Codes Class Noted    Obesity, morbid [facility]) ICD-10-CM: E66.01  ICD-9-CM: 278.01  2/15/2018        Autoimmune hepatitis ([facility] 75.) ICD-10-CM: K75.4  ICD-9-CM: 571.42  5/30/2017        Diabetes mellitus, type 2 ([facility] ICD-10-CM: E11.9  ICD-9-CM: 250.00  5/30/2017        Edema ICD-10-CM: R60.9  ICD-9-CM: 782.3  5/30/2017        Hypertension ICD-10-CM: I10  ICD-9-CM: 401.9  5/30/2017        Hypercholesterolemia ICD-10-CM: E78.00  ICD-9-CM: 272.0  5/30/2017           [facility] Phoenix [facility] autoimmune hepatitis. The active problem list, all pertinent past medical history, medications, and laboratory findings related to the liver disorder were reviewed with the patient. The patient is a 74 y.o. female who was first noted to have abnormalities in liver transaminases and alkaline phosphase in 3/2017. Further evaluation of these abnormalities has included serologic studies which were positive for ASMA    Ultrasound of the liver was performed in 3/2017. This demonstrated slightly increased echogenicity. A liver biopsy was performed in 3/2017.  This demonstrated severe inflammation and portal fibrosis. The patient has no symptoms which can be attributed to the liver disorder. The patient has not experienced pain in the right side over the liver, yellowing of the eyes or skin, problems concentrating. The patient has moderate limitations in functional activities which can be attributed to other medical problems that are not related to the liver disease. ALLERGIES  Allergies   Allergen Reactions    Lisinopril Angioedema       MEDICATIONS  Current Outpatient Medications   Medication Sig    atorvastatin (LIPITOR) 40 mg tablet Take 80 mg by mouth in the morning. carboxymethylcellulose sodium (REFRESH LIQUIGEL) 1 % dlgl ophthalmic solution Administer 1 Drop to both eyes three (3) times daily as needed (DRY EYES). guaiFENesin-dextromethorphan SR (Mucinex DM) 600-30 mg per tablet Take 1 Tablet by mouth two (2) times a day. cetirizine (ZyrTEC) 10 mg tablet Take one tablets by mouth daily for 7 days and then as needed after that. Restart forseven days if sinus congestion recurs. glycopyrrolate (RobinuL) 1 mg tablet Take 1 Tablet by mouth three (3) times daily as needed (gall bladder attacks). Indications: gall bladder attacks    furosemide (LASIX) 40 mg tablet TAKE 1 TABLET EVERY DAY    spironolactone (ALDACTONE) 100 mg tablet TAKE 1 TABLET EVERY DAY    methocarbamoL (Robaxin) 500 mg tablet Take 2 Tabs by mouth three (3) times daily. aspirin delayed-release 81 mg tablet Take  by mouth daily. amLODIPine (NORVASC) 2.5 mg tablet     LANTUS SOLOSTAR 100 unit/mL (3 mL) inpn 26 Units daily. No current facility-administered medications for this visit. SYSTEM REVIEW NOT RELATED TO LIVER DISEASE OR REVIEWED ABOVE:  Constitution systems: morbid obesity  Eyes: Negative for visual changes. ENT: Negative for sore throat, painful swallowing. Respiratory: Negative for cough, hemoptysis, SOB. Cardiology: Negative for chest pain, palpitations.   GI: Negative for constipation or diarrhea. : Negative for urinary frequency, dysuria, hematuria, nocturia. Skin: Negative for rash. Hematology: Negative for easy bruising, blood clots. Musculo-skelatal: Arthritis of knees. Neurologic: Negative for headaches, dizziness, vertigo, memory problems not related to HE. Psychology: Negative for anxiety, depression. FAMILY HISTORY:  The father  of DM. The mother  of Sarcoidosis. There is no family history of liver disease. There is no family history of immune disorders. SOCIAL HISTORY:  The patient is . The patient has 2 children, and 7 grandchildren. The patient has never used tobacco products. The patient consumes alcohol on social occasions never in excess. The patient has been abstinent from alcohol since 3/2017. The patient used to work in [employer] for children. The patient has not worked since 2015. PHYSICAL EXAM:    Visit Vitals  BP (!) 190/65   Pulse 69   Temp 97.4 °F (36.3 °C)   Ht 5' 1\" (1.549 m)   Wt 309 lb (140.2 kg)   SpO2 98%   BMI 58.39 kg/m²       General: No acute distress. Eyes: Sclera anicteric. ENT: No oral lesions. Thyroid normal.  Nodes: No adenopathy. Skin: No spider angiomata. No jaundice. No palmar erythema. Respiratory: Lungs clear to auscultation. Cardiovascular: Regular heart rate. No murmurs. No JVD. Abdomen: Soft non-tender. Liver size normal to percussion/palpation. Spleen not palpable. No obvious ascites. Extremities: No edema. No muscle wasting. No gross arthritic changes. Neurologic: Alert and oriented. Cranial nerves grossly intact. No asterixis.     LABORATORY STUDIES:  Liver [place] of [address] Units 3/30/2022   WBC 4.6 - 13.2 K/uL 7.0   ANC 1.8 - 8.0 K/UL 4.6   HGB 12.0 - 16.0 g/dL 12.2    - 420 K/uL 270   INR 0.8 - 1.2   1.1   AST 10 - 38 U/L 8 (L)   ALT 13 - 56 U/L 11 (L)   Alk Phos 45 - 117 U/L 112   Bili, Total 0.2 - 1.0 MG/DL 0.4   Albumin 3.4 - 5.0 g/dL 3.3 (L)   BUN 7.0 - 18 MG/DL 12   Creat 0.6 - 1.3 MG/DL 0.72   Na 136 - 145 mmol/L 141   K 3.5 - 5.5 mmol/L 2.9 (LL)   Cl 100 - 111 mmol/L 106   CO2 21 - 32 mmol/L 31   Glucose 74 - 99 mg/dL 135 (H)   Magnesium 1.6 - 2.6 mg/dL 1.8     SEROLOGIES:  3/2017. HBsAntigen negative, anti-HCV negative, Ferritin 239, iron saturation 11%, KAYLEE negative, ASMA positive 1:320,     LIVER HISTOLOGY:  3/2017. Severe active hepatitis with PMN and portal fibrosis. Biopsy slides not reviewed by MLS.  5/2018. Shear wave elastography performed at [facility]. EkPa was E Range: 4.24-9.41 kPa, E Mean: 6.39 kPa, E Median: 6.10 kPa, E Std: 1.54 kPa. The results suggested a fibrosis level of F1.  11/2019. Shear wave elastography performed at [facility]. EkPa was E Range: 10.32 kPa, E Mean: 12.49 kPa, E Median: 13.02 kPa, E Std: 5.68 kPa. The results suggested a fibrosis level of F4. The high IQR% suggests that the measurement may not be reliable. 6/2020. Shear wave elastography performed at [facility]. EkPa was E Range: 12.9-19.96 kPa, E Mean: 15.98 kPa, E Median: 15.25 kPa, E Std: 2.66 kPa. The results suggested a fibrosis level of F4. The high IQR% suggests that the measurement may not be reliable. ENDOSCOPIC PROCEDURES:  Not available or performed    RADIOLOGY:  3/2017. Ultrasound of liver. Mild increased echogenicity. No liver mass lesions. No dilated bile ducts. No ascites. 5/2018. Ultrasound of the liver. Mild increased echogenicity. No liver mass lesions. No dilated bile ducts. No ascites. 6/2019. Ultrasound of the liver. Mild increased echogenicity. No liver mass lesions. No dilated bile ducts. No ascites. 6/2020. Ultrasound of liver. Echogenic consistent with chronic liver disease. No liver mass lesions. No dilated bile ducts. No ascites.       OTHER TESTING:  Not available or performed    ASSESSMENT AND PLAN:  Autoimmune Hepatitis with portal fibrosis    Liver transaminases are normal.  Alkaline phosphate is normal.  Liver function is normal.  The platelet count is normal.    She is no longer on Tacrolimus. Discussed we will continue to monitor labs for possible flair of AIH. The patient was directed to continue all other medications at the current dosages. There are no contraindications for the patient to take any medications that are necessary for treatment of other medical issues. Due to body habitus Fibroscan/elastography is not an accurate indicator. The patient was counseled regarding alcohol consumption. Vaccination for viral hepatitis A and B is recommended since the patient has no serologic evidence of previous exposure or vaccination with immunity. Will perform laboratory testing to monitor liver function and degree of liver injury. This included BMP, hepatic panel, CBC with platelet count. FOLLOW-UP:  All of the issues listed above in the Assessment and Plan were discussed with the patient. All questions were answered. The patient expressed a clear understanding of the above. [facility] 87 in 6 months for routine monitoring.        Ale Dinh AGPCNP-BC  [address] Liver [place] of [place] [address]  [facility], [address] [place] [address] - [address]  [phone]

## 2022-08-04 LAB
ALBUMIN SERPL-MCNC: 3.9 G/DL (ref 3.8–4.8)
ALP SERPL-CCNC: 101 IU/L (ref 44–121)
ALT SERPL-CCNC: 10 IU/L (ref 0–32)
AST SERPL-CCNC: 11 IU/L (ref 0–40)
BASOPHILS # BLD AUTO: 0 X10E3/UL (ref 0–0.2)
BASOPHILS NFR BLD AUTO: 0 %
BILIRUB DIRECT SERPL-MCNC: 0.15 MG/DL (ref 0–0.4)
BILIRUB SERPL-MCNC: 0.4 MG/DL (ref 0–1.2)
BUN SERPL-MCNC: 12 MG/DL (ref 8–27)
BUN/CREAT SERPL: 19 (ref 12–28)
CALCIUM SERPL-MCNC: 8.9 MG/DL (ref 8.7–10.3)
CHLORIDE SERPL-SCNC: 100 MMOL/L (ref 96–106)
CO2 SERPL-SCNC: 27 MMOL/L (ref 20–29)
CREAT SERPL-MCNC: 0.64 MG/DL (ref 0.57–1)
EGFR: 96 ML/MIN/1.73
EOSINOPHIL # BLD AUTO: 0.2 X10E3/UL (ref 0–0.4)
EOSINOPHIL NFR BLD AUTO: 4 %
ERYTHROCYTE [DISTWIDTH] IN BLOOD BY AUTOMATED COUNT: 15 % (ref 11.7–15.4)
GLUCOSE SERPL-MCNC: 120 MG/DL (ref 65–99)
HCT VFR BLD AUTO: 37.9 % (ref 34–46.6)
HGB BLD-MCNC: 12.1 G/DL (ref 11.1–15.9)
IMM GRANULOCYTES # BLD AUTO: 0 X10E3/UL (ref 0–0.1)
IMM GRANULOCYTES NFR BLD AUTO: 0 %
LYMPHOCYTES # BLD AUTO: 2.5 X10E3/UL (ref 0.7–3.1)
LYMPHOCYTES NFR BLD AUTO: 37 %
MCH RBC QN AUTO: 27.1 PG (ref 26.6–33)
MCHC RBC AUTO-ENTMCNC: 31.9 G/DL (ref 31.5–35.7)
MCV RBC AUTO: 85 FL (ref 79–97)
MONOCYTES # BLD AUTO: 0.8 X10E3/UL (ref 0.1–0.9)
MONOCYTES NFR BLD AUTO: 12 %
NEUTROPHILS # BLD AUTO: 3.2 X10E3/UL (ref 1.4–7)
NEUTROPHILS NFR BLD AUTO: 47 %
PLATELET # BLD AUTO: 256 X10E3/UL (ref 150–450)
POTASSIUM SERPL-SCNC: 3.8 MMOL/L (ref 3.5–5.2)
PROT SERPL-MCNC: 7.6 G/DL (ref 6–8.5)
RBC # BLD AUTO: 4.46 X10E6/UL (ref 3.77–5.28)
SODIUM SERPL-SCNC: 144 MMOL/L (ref 134–144)
WBC # BLD AUTO: 6.8 X10E3/UL (ref 3.4–10.8)

## 2022-08-09 ENCOUNTER — TELEPHONE (OUTPATIENT)
Dept: HEMATOLOGY | Age: 67
End: 2022-08-09

## 2022-08-09 NOTE — TELEPHONE ENCOUNTER
----- Message from Adolph Anthony NP sent at 8/4/2022  2:56 PM EDT -----  Labs reviewed. All labs were either normal and/or abnormal values were not clinically meaningful to patient's current visit.

## 2023-02-27 ENCOUNTER — HOSPITAL ENCOUNTER (OUTPATIENT)
Facility: HOSPITAL | Age: 68
Setting detail: SPECIMEN
Discharge: HOME | End: 2023-03-02

## 2023-02-27 ENCOUNTER — OFFICE VISIT (OUTPATIENT)
Age: 68
End: 2023-02-27
Payer: MEDICARE

## 2023-02-27 VITALS
SYSTOLIC BLOOD PRESSURE: 121 MMHG | WEIGHT: 293 LBS | TEMPERATURE: 96.4 F | OXYGEN SATURATION: 95 % | HEART RATE: 77 BPM | DIASTOLIC BLOOD PRESSURE: 65 MMHG | BODY MASS INDEX: 57.25 KG/M2

## 2023-02-27 DIAGNOSIS — K75.4 AUTOIMMUNE HEPATITIS (HCC): Primary | ICD-10-CM

## 2023-02-27 LAB — LABCORP SPECIMEN COLLECTION: NORMAL

## 2023-02-27 PROCEDURE — 99001 SPECIMEN HANDLING PT-LAB: CPT

## 2023-02-27 PROCEDURE — 1123F ACP DISCUSS/DSCN MKR DOCD: CPT | Performed by: NURSE PRACTITIONER

## 2023-02-27 PROCEDURE — 3078F DIAST BP <80 MM HG: CPT | Performed by: NURSE PRACTITIONER

## 2023-02-27 PROCEDURE — 3074F SYST BP LT 130 MM HG: CPT | Performed by: NURSE PRACTITIONER

## 2023-02-27 PROCEDURE — 99213 OFFICE O/P EST LOW 20 MIN: CPT | Performed by: NURSE PRACTITIONER

## 2023-02-27 NOTE — PROGRESS NOTES
[address] [facility], [place], [address], [facility], [place]      МАРИЯ Kendrick, [facility]-BC     Ana FREEDMAN Makayla, [facility]   ALETA Sosa-VICTOR MANUEL Cheng, [facility]       Jasmina [facility] 136    at [address], [address] [place] [address], [facility] 22. [phone]    FAX: [address] [place] [address], [address]    [phone]    FAX: [phone]     Patient Care Team:  Heri Joel MD as PCP - General (Family Medicine)  Rebeca Bruce MD (Gastroenterology)      Problem List  Date Reviewed: 11/23/2021            Codes Class Noted    Obesity, morbid [facility]) ICD-10-CM: E66.01  ICD-9-CM: 278.01  2/15/2018        Autoimmune hepatitis ([facility] ICD-10-CM: K75.4  ICD-9-CM: 571.42  5/30/2017        Diabetes mellitus, type 2 ([facility] ICD-10-CM: E11.9  ICD-9-CM: 250.00  5/30/2017        Edema ICD-10-CM: R60.9  ICD-9-CM: 782.3  5/30/2017        Hypertension ICD-10-CM: I10  ICD-9-CM: 401.9  5/30/2017        Hypercholesterolemia ICD-10-CM: E78.00  ICD-9-CM: 272.0  5/30/2017           Casey Cavazos returns to the [facility] for management of autoimmune hepatitis. The active problem list, all pertinent past medical history, medications, and laboratory findings related to the liver disorder were reviewed with the patient. The patient is a 69 y.o. female who was first noted to have abnormalities in liver transaminases and alkaline phosphase in 3/2017. Further evaluation of these abnormalities has included serologic studies which were positive for ASMA    Ultrasound of the liver was performed in 3/2017. This demonstrated slightly increased echogenicity. A liver biopsy was performed in 3/2017.  This demonstrated severe inflammation and portal fibrosis. The patient has no symptoms which can be attributed to the liver disorder. The patient has not experienced pain in the right side over the liver, yellowing of the eyes or skin, problems concentrating. The patient has moderate limitations in functional activities which can be attributed to other medical problems that are not related to the liver disease. ASSESSMENT AND PLAN:  Autoimmune Hepatitis with portal fibrosis    Liver transaminases are normal.  Alkaline phosphate is normal.  Liver function is normal.  The platelet count is normal.    She is no longer on Tacrolimus. Discussed we will continue to monitor labs for possible flair of AIH. The patient was directed to continue all other medications at the current dosages. There are no contraindications for the patient to take any medications that are necessary for treatment of other medical issues. Due to body habitus Fibroscan/elastography is not an accurate indicator. The patient was counseled regarding alcohol consumption. Vaccination for viral hepatitis A and B is recommended since the patient has no serologic evidence of previous exposure or vaccination with immunity. Will perform laboratory testing to monitor liver function and degree of liver injury. This included BMP, hepatic panel, CBC with platelet count.      ALLERGIES  Allergies   Allergen Reactions    Lisinopril Angioedema       MEDICATIONS  Current Outpatient Medications   Medication Sig Dispense Refill    amLODIPine (NORVASC) 2.5 MG tablet ceived the following from [facility]: Outside name: amLODIPine (NORVASC) 2.5 mg tablet      aspirin 81 MG EC tablet Take by mouth daily      atorvastatin (LIPITOR) 40 MG tablet Take 80 mg by mouth daily      carboxymethylcellulose (THERATEARS) 1 % ophthalmic gel Apply 1 drop to eye 3 times daily as needed      cetirizine (ZYRTEC) 10 MG tablet Take one tablets by mouth daily for 7 days and then as needed after that. Restart  days if sinus congestion recurs. dextromethorphan-guaiFENesin (MUCINEX DM)  MG per extended release tablet Take 1 tablet by mouth 2 times daily      furosemide (LASIX) 40 MG tablet TAKE 1 TABLET EVERY DAY      glycopyrrolate (ROBINUL) 1 MG tablet Take 1 mg by mouth 3 times daily as needed      insulin glargine (LANTUS SOLOSTAR) 100 UNIT/ML injection pen 26 Units daily      methocarbamol (ROBAXIN) 500 MG tablet Take 1,000 mg by mouth 3 times daily      spironolactone (ALDACTONE) 100 MG tablet TAKE 1 TABLET EVERY DAY       No current facility-administered medications for this visit. SYSTEM REVIEW NOT RELATED TO LIVER DISEASE OR REVIEWED ABOVE:  Constitution systems: morbid obesity  Eyes: Negative for visual changes. ENT: Negative for sore throat, painful swallowing. Respiratory: Negative for cough, hemoptysis, SOB. Cardiology: Negative for chest pain, palpitations. GI:  Negative for constipation or diarrhea. : Negative for urinary frequency, dysuria, hematuria, nocturia. Skin: Negative for rash. Hematology: Negative for easy bruising, blood clots. Musculo-skelatal: Arthritis of knees. Neurologic: Negative for headaches, dizziness, vertigo, memory problems not related to HE. Psychology: Negative for anxiety, depression. FAMILY HISTORY:  The father  of DM. The mother  of Sarcoidosis. There is no family history of liver disease. There is no family history of immune disorders. SOCIAL HISTORY:  The patient is . The patient has 2 children, and 7 grandchildren. The patient has never used tobacco products. The patient consumes alcohol on social occasions never in excess. The patient has been abstinent from alcohol since 3/2017. The patient used to work in [employer] for children. The patient has not worked since 2015.       PHYSICAL EXAM:    Visit Vitals  /65   Pulse 77   Temp (!) 96.4 °F (35.8 °C) (Tympanic)   Wt (!) 303 lb (137.4 kg)   SpO2 95%   BMI 57.25 kg/m²     General: No acute distress. Eyes: Sclera anicteric. ENT: No oral lesions. Thyroid normal.  Nodes: No adenopathy. Skin: No spider angiomata. No jaundice. No palmar erythema. Respiratory: Lungs clear to auscultation. Cardiovascular: Regular heart rate. No murmurs. No JVD. Abdomen: Soft non-tender. Liver size normal to percussion/palpation. Spleen not palpable. No obvious ascites. Extremities: No edema. No muscle wasting. No gross arthritic changes. Neurologic: Alert and oriented. Cranial nerves grossly intact. No asterixis. LABORATORY STUDIES:  Liver [place] of [address] Units 8/3/2022   WBC 3.4 - 10.8 x10E3/uL 6.8   ANC 1.4 - 7.0 x10E3/uL 3.2   HGB 11.1 - 15.9 g/dL 12.1    - 450 x10E3/uL 256   AST 0 - 40 IU/L 11   ALT 0 - 32 IU/L 10   Alk Phos 44 - 121 IU/L 101   Bili, Total 0.0 - 1.2 mg/dL 0.4   Bili, Direct 0.00 - 0.40 mg/dL 0.15   Albumin 3.8 - 4.8 g/dL 3.9   BUN 8 - 27 mg/dL 12   Creat 0.57 - 1.00 mg/dL 0.64   Na 134 - 144 mmol/L 144   K 3.5 - 5.2 mmol/L 3.8   Cl 96 - 106 mmol/L 100   CO2 20 - 29 mmol/L 27   Glucose 65 - 99 mg/dL 120 (H)     SEROLOGIES:  3/2017. HBsAntigen negative, anti-HCV negative, Ferritin 239, iron saturation 11%, TOD negative, ASMA positive 1:320,     LIVER HISTOLOGY:  3/2017. Severe active hepatitis with PMN and portal fibrosis. Biopsy slides not reviewed by MLS.  5/2018. Shear wave elastography performed at [facility]. EkPa was E Range: 4.24-9.41 kPa, E Mean: 6.39 kPa, E Median: 6.10 kPa, E Std: 1.54 kPa. The results suggested a fibrosis level of F1.  11/2019. Shear wave elastography performed at [facility]. EkPa was E Range: 10.32 kPa, E Mean: 12.49 kPa, E Median: 13.02 kPa, E Std: 5.68 kPa. The results suggested a fibrosis level of F4. The high IQR% suggests that the measurement may not be reliable. 6/2020. Shear wave elastography performed at [facility].  EkPa was E Range: 12.9-19.96 kPa, E Mean: 15.98 kPa, E Median: 15.25 kPa, E Std: 2.66 kPa. The results suggested a fibrosis level of F4. The high IQR% suggests that the measurement may not be reliable. ENDOSCOPIC PROCEDURES:  Not available or performed    RADIOLOGY:  6/2020. Ultrasound of liver. Echogenic consistent with chronic liver disease. No liver mass lesions. No dilated bile ducts. No ascites. 7/2021. CT scan abdomen with IV contrast. Normal appearing liver. No liver mass lesions. Normal spleen. No ascites. OTHER TESTING:  Not available or performed      FOLLOW-UP:  All of the issues listed above in the Assessment and Plan were discussed with the patient. All questions were answered. The patient expressed a clear understanding of the above. [address] [address] in 6 months for routine monitoring.        Coty Ma AGPCNP-BC  [address] [facility] [place] [address] [address]  [phone]

## 2023-02-28 LAB
ALBUMIN SERPL-MCNC: 4.1 G/DL (ref 3.8–4.8)
ALP SERPL-CCNC: 110 IU/L (ref 44–121)
ALT SERPL-CCNC: 8 IU/L (ref 0–32)
AST SERPL-CCNC: 14 IU/L (ref 0–40)
BASOPHILS # BLD AUTO: 0 X10E3/UL (ref 0–0.2)
BASOPHILS NFR BLD AUTO: 1 %
BILIRUB DIRECT SERPL-MCNC: 0.11 MG/DL (ref 0–0.4)
BILIRUB SERPL-MCNC: 0.4 MG/DL (ref 0–1.2)
BUN SERPL-MCNC: 14 MG/DL (ref 8–27)
BUN/CREAT SERPL: 17 (ref 12–28)
CALCIUM SERPL-MCNC: 8.8 MG/DL (ref 8.7–10.3)
CHLORIDE SERPL-SCNC: 100 MMOL/L (ref 96–106)
CO2 SERPL-SCNC: 20 MMOL/L (ref 20–29)
CREAT SERPL-MCNC: 0.83 MG/DL (ref 0.57–1)
EGFRCR SERPLBLD CKD-EPI 2021: 76 ML/MIN/1.73
EOSINOPHIL # BLD AUTO: 0.2 X10E3/UL (ref 0–0.4)
EOSINOPHIL NFR BLD AUTO: 2 %
ERYTHROCYTE [DISTWIDTH] IN BLOOD BY AUTOMATED COUNT: 14 % (ref 11.7–15.4)
GLUCOSE SERPL-MCNC: 114 MG/DL (ref 70–99)
HCT VFR BLD AUTO: 37.4 % (ref 34–46.6)
HGB BLD-MCNC: 12.5 G/DL (ref 11.1–15.9)
IMM GRANULOCYTES # BLD AUTO: 0 X10E3/UL (ref 0–0.1)
IMM GRANULOCYTES NFR BLD AUTO: 1 %
LYMPHOCYTES # BLD AUTO: 2.3 X10E3/UL (ref 0.7–3.1)
LYMPHOCYTES NFR BLD AUTO: 31 %
MCH RBC QN AUTO: 28.9 PG (ref 26.6–33)
MCHC RBC AUTO-ENTMCNC: 33.4 G/DL (ref 31.5–35.7)
MCV RBC AUTO: 87 FL (ref 79–97)
MONOCYTES # BLD AUTO: 0.5 X10E3/UL (ref 0.1–0.9)
MONOCYTES NFR BLD AUTO: 7 %
NEUTROPHILS # BLD AUTO: 4.3 X10E3/UL (ref 1.4–7)
NEUTROPHILS NFR BLD AUTO: 58 %
PLATELET # BLD AUTO: 287 X10E3/UL (ref 150–450)
POTASSIUM SERPL-SCNC: 4.4 MMOL/L (ref 3.5–5.2)
PROT SERPL-MCNC: 7.7 G/DL (ref 6–8.5)
RBC # BLD AUTO: 4.32 X10E6/UL (ref 3.77–5.28)
SODIUM SERPL-SCNC: 140 MMOL/L (ref 134–144)
SPECIMEN STATUS REPORT: NORMAL
WBC # BLD AUTO: 7.3 X10E3/UL (ref 3.4–10.8)

## 2023-03-02 ENCOUNTER — TELEPHONE (OUTPATIENT)
Age: 68
End: 2023-03-02

## 2023-03-02 NOTE — TELEPHONE ENCOUNTER
----- Message from MURPHY Kat NP sent at 2/28/2023  9:46 AM EST -----  Labs reviewed.  All labs were either normal and/or abnormal values were not clinically meaningful to patient's current visit

## 2023-07-19 ENCOUNTER — HOSPITAL ENCOUNTER (OUTPATIENT)
Facility: HOSPITAL | Age: 68
Setting detail: OUTPATIENT SURGERY
Discharge: HOME | End: 2023-07-19
Attending: OPHTHALMOLOGY | Admitting: OPHTHALMOLOGY
Payer: MEDICARE

## 2023-07-19 VITALS
HEART RATE: 77 BPM | DIASTOLIC BLOOD PRESSURE: 79 MMHG | TEMPERATURE: 98.2 F | RESPIRATION RATE: 16 BRPM | BODY MASS INDEX: 55.32 KG/M2 | HEIGHT: 61 IN | WEIGHT: 293 LBS | SYSTOLIC BLOOD PRESSURE: 216 MMHG | OXYGEN SATURATION: 100 %

## 2023-07-19 LAB — GLUCOSE BLD STRIP.AUTO-MCNC: 146 MG/DL (ref 70–110)

## 2023-07-19 PROCEDURE — 82962 GLUCOSE BLOOD TEST: CPT

## 2023-07-19 RX ORDER — PHENYLEPHRINE HCL 2.5 %
1 DROPS OPHTHALMIC (EYE)
Status: ACTIVE | OUTPATIENT
Start: 2023-07-19 | End: 2023-07-19

## 2023-07-19 RX ORDER — OFLOXACIN 3 MG/ML
1 SOLUTION/ DROPS OPHTHALMIC
Status: DISCONTINUED | OUTPATIENT
Start: 2023-07-19 | End: 2023-07-30 | Stop reason: HOSPADM

## 2023-07-19 RX ORDER — TROPICAMIDE 10 MG/ML
1 SOLUTION/ DROPS OPHTHALMIC
Status: ACTIVE | OUTPATIENT
Start: 2023-07-19 | End: 2023-07-19

## 2023-07-19 RX ORDER — OFLOXACIN 3 MG/ML
1 SOLUTION/ DROPS OPHTHALMIC
Status: DISCONTINUED | OUTPATIENT
Start: 2023-07-19 | End: 2023-07-19

## 2023-07-19 RX ORDER — SODIUM CHLORIDE, SODIUM LACTATE, POTASSIUM CHLORIDE, CALCIUM CHLORIDE 600; 310; 30; 20 MG/100ML; MG/100ML; MG/100ML; MG/100ML
INJECTION, SOLUTION INTRAVENOUS CONTINUOUS
Status: DISCONTINUED | OUTPATIENT
Start: 2023-07-19 | End: 2023-07-30 | Stop reason: HOSPADM

## 2023-07-19 ASSESSMENT — PAIN - FUNCTIONAL ASSESSMENT: PAIN_FUNCTIONAL_ASSESSMENT: 0-10

## 2023-07-19 NOTE — PERIOP NOTE
Reviewed PTA medication list with patient/caregiver and patient/caregiver denies any additional medications. Patient admits to having a responsible adult care for them at home for at least 24 hours after surgery. Patient encouraged to use gown warming system and informed that using said warming gown to regulate body temperature prior to a procedure has been shown to help reduce the risks of blood clots and infection. Patient's pharmacy of choice verified and documented in PTA medication section. Dual skin assessment & fall risk band verification completed with Shelia Krishnan RN.

## 2023-07-19 NOTE — PERIOP NOTE
Dr. Felisha Garciaous aware that patients BP: 223/80; 216/79 patient last dose of Amlodipine and Metoprolol was 7/17/2023, patient is being cancelled for today and has been instructed that she needs to continue her BP medications up until day of surgery and will need to contact Dr. Martin Beatty office to reschedule her procedure. Patient has verbalized understanding and will be proceeding home to take her medications as scheduled.

## 2023-09-08 ENCOUNTER — TELEPHONE (OUTPATIENT)
Age: 68
End: 2023-09-08

## 2023-09-08 NOTE — TELEPHONE ENCOUNTER
09/08/2023    Received call from patient wanting to verify appt details for 9/11/2023. Patient verbalized understanding and has no further questions or concerns at this time.      efs

## 2023-09-11 ENCOUNTER — OFFICE VISIT (OUTPATIENT)
Age: 68
End: 2023-09-11
Payer: MEDICARE

## 2023-09-11 VITALS
SYSTOLIC BLOOD PRESSURE: 156 MMHG | HEART RATE: 68 BPM | OXYGEN SATURATION: 98 % | WEIGHT: 283 LBS | DIASTOLIC BLOOD PRESSURE: 95 MMHG | BODY MASS INDEX: 53.43 KG/M2 | HEIGHT: 61 IN | TEMPERATURE: 96.6 F

## 2023-09-11 DIAGNOSIS — K75.4 AUTOIMMUNE HEPATITIS (HCC): Primary | ICD-10-CM

## 2023-09-11 PROCEDURE — 3080F DIAST BP >= 90 MM HG: CPT | Performed by: NURSE PRACTITIONER

## 2023-09-11 PROCEDURE — 1123F ACP DISCUSS/DSCN MKR DOCD: CPT | Performed by: NURSE PRACTITIONER

## 2023-09-11 PROCEDURE — 99213 OFFICE O/P EST LOW 20 MIN: CPT | Performed by: NURSE PRACTITIONER

## 2023-09-11 PROCEDURE — 3077F SYST BP >= 140 MM HG: CPT | Performed by: NURSE PRACTITIONER

## 2023-09-11 NOTE — PROGRESS NOTES
MD Fletcher, FACP, [place], [place]      Lena Lyons, МАРИЯ Benavides, [facility]   Doryslaurakristy Gabby, [facility]-   Ayse Guajardo, YASMINP-VICTOR MANUEL Smith, FNP-VICTOR MANUEL Flanagan, PCNP-[facility]      [address]   at [facility]   [facility], [address] [facility], [address]   [phone]   FAX: [phone]  Liver [place] [facility]   at [facility], [facility], [address]   [phone]   FAX: [phone]     Patient Care Team:  Esequiel Cheng MD as PCP - General (Family Medicine)  Maggie Shi MD (Gastroenterology)    Patient Active Problem List   Diagnosis    Hypertension    Obesity, morbid ([address])    Edema    Autoimmune hepatitis ([address])    Diabetes mellitus, type 2 ([address])    Hypercholesterolemia       Yennifer Ordoñez returns to the [address] [facility] for management of autoimmune hepatitis. The active problem list, all pertinent past medical history, medications, and laboratory findings related to the liver disorder were reviewed with the patient. The patient is a 69 y.o. female who was first noted to have abnormalities in liver transaminases and alkaline phosphase in 3/2017. Further evaluation of these abnormalities has included serologic studies which were positive for ASMA    Ultrasound of the liver was performed in 3/2017. This demonstrated slightly increased echogenicity. A liver biopsy was performed in 3/2017. This demonstrated severe inflammation and portal fibrosis. The patient has no symptoms which can be attributed to the liver disorder. The patient has not experienced pain in the right side over the liver, yellowing of the eyes or skin, problems concentrating.      The patient has moderate limitations in functional activities which can be

## 2023-11-23 ENCOUNTER — HOSPITAL ENCOUNTER (EMERGENCY)
Facility: HOSPITAL | Age: 68
Discharge: HOME | End: 2023-11-24
Attending: EMERGENCY MEDICINE
Payer: MEDICARE

## 2023-11-23 VITALS
OXYGEN SATURATION: 99 % | RESPIRATION RATE: 18 BRPM | BODY MASS INDEX: 53.81 KG/M2 | WEIGHT: 285 LBS | TEMPERATURE: 98.1 F | HEART RATE: 90 BPM | SYSTOLIC BLOOD PRESSURE: 170 MMHG | DIASTOLIC BLOOD PRESSURE: 60 MMHG | HEIGHT: 61 IN

## 2023-11-23 DIAGNOSIS — N85.00 ENDOMETRIAL HYPERPLASIA: ICD-10-CM

## 2023-11-23 DIAGNOSIS — N93.9 VAGINAL BLEEDING: Primary | ICD-10-CM

## 2023-11-23 PROCEDURE — 99284 EMERGENCY DEPT VISIT MOD MDM: CPT

## 2023-11-23 PROCEDURE — 96372 THER/PROPH/DIAG INJ SC/IM: CPT

## 2023-11-23 ASSESSMENT — PAIN - FUNCTIONAL ASSESSMENT: PAIN_FUNCTIONAL_ASSESSMENT: NONE - DENIES PAIN

## 2023-11-24 LAB
HCT VFR BLD AUTO: 34.2 % (ref 35–45)
HGB BLD-MCNC: 11.1 G/DL (ref 12–16)

## 2023-11-24 PROCEDURE — 85018 HEMOGLOBIN: CPT

## 2023-11-24 PROCEDURE — 96372 THER/PROPH/DIAG INJ SC/IM: CPT

## 2023-11-24 PROCEDURE — 6370000000 HC RX 637 (ALT 250 FOR IP): Performed by: EMERGENCY MEDICINE

## 2023-11-24 PROCEDURE — 6360000002 HC RX W HCPCS: Performed by: EMERGENCY MEDICINE

## 2023-11-24 PROCEDURE — 85014 HEMATOCRIT: CPT

## 2023-11-24 RX ORDER — PREDNISONE 20 MG/1
10 TABLET ORAL 2 TIMES DAILY
Qty: 5 TABLET | Refills: 0 | Status: SHIPPED | OUTPATIENT
Start: 2023-11-24 | End: 2023-11-24 | Stop reason: SDUPTHER

## 2023-11-24 RX ORDER — DEXAMETHASONE SODIUM PHOSPHATE 4 MG/ML
4 INJECTION, SOLUTION INTRA-ARTICULAR; INTRALESIONAL; INTRAMUSCULAR; INTRAVENOUS; SOFT TISSUE
Status: COMPLETED | OUTPATIENT
Start: 2023-11-24 | End: 2023-11-24

## 2023-11-24 RX ORDER — PREDNISONE 20 MG/1
10 TABLET ORAL 2 TIMES DAILY
Qty: 5 TABLET | Refills: 0 | Status: SHIPPED | OUTPATIENT
Start: 2023-11-24 | End: 2023-11-29

## 2023-11-24 RX ORDER — METHOCARBAMOL 500 MG/1
1000 TABLET, FILM COATED ORAL
Status: COMPLETED | OUTPATIENT
Start: 2023-11-24 | End: 2023-11-24

## 2023-11-24 RX ORDER — METHOCARBAMOL 750 MG/1
750 TABLET, FILM COATED ORAL 4 TIMES DAILY
Qty: 40 TABLET | Refills: 0 | Status: SHIPPED | OUTPATIENT
Start: 2023-11-24 | End: 2023-11-24 | Stop reason: SDUPTHER

## 2023-11-24 RX ORDER — HYDROCODONE BITARTRATE AND ACETAMINOPHEN 5; 325 MG/1; MG/1
1 TABLET ORAL
Status: COMPLETED | OUTPATIENT
Start: 2023-11-24 | End: 2023-11-24

## 2023-11-24 RX ORDER — METHOCARBAMOL 750 MG/1
750 TABLET, FILM COATED ORAL 4 TIMES DAILY
Qty: 40 TABLET | Refills: 0 | Status: SHIPPED | OUTPATIENT
Start: 2023-11-24 | End: 2023-12-04

## 2023-11-24 RX ADMIN — HYDROCODONE BITARTRATE AND ACETAMINOPHEN 1 TABLET: 5; 325 TABLET ORAL at 00:54

## 2023-11-24 RX ADMIN — DEXAMETHASONE SODIUM PHOSPHATE 4 MG: 4 INJECTION INTRA-ARTICULAR; INTRALESIONAL; INTRAMUSCULAR; INTRAVENOUS; SOFT TISSUE at 00:54

## 2023-11-24 RX ADMIN — METHOCARBAMOL 1000 MG: 500 TABLET ORAL at 00:54

## 2023-11-24 ASSESSMENT — PAIN DESCRIPTION - ORIENTATION: ORIENTATION: RIGHT

## 2023-11-24 ASSESSMENT — PAIN SCALES - GENERAL: PAINLEVEL_OUTOF10: 9

## 2023-11-24 ASSESSMENT — PAIN DESCRIPTION - LOCATION: LOCATION: ARM

## 2023-11-24 NOTE — ED NOTES
Patient arrives via EMS for vaginal bleeding. Patient states she started a few hours ago, but her period was last two weeks ago. She states she has had a period every month, and that she is supposed to have surgery in January to address it. A&Ox4, hypertensive, otherwise VSS.      Michael Gutierrez RN  11/23/23 0843

## 2023-11-24 NOTE — PROGRESS NOTES
Patient given discharge paperwork. Instructions reviewed verbally and all questions answered. Patient's wristband and stickers removed and shredded. Patient ambulated from room out to lobby indepedently.

## 2023-12-10 NOTE — ED PROVIDER NOTES
High  9.9 0.0 0.00 AUTOMATED 89.3   07/24/21 01:28:00 07/24/21 02:08:00 0.0   4.61  10.6   AUTOMATED 87.0       Collection Time Result Time MCH MCHC RDW PLT NEUTRO PCT LYMPHO PCT   11/24/23 00:21:00 11/24/23 00:53:39         Previous Results   02/27/23 00:00:00 02/28/23 08:37:00 28.9 33.4 14.0 287 58 31   08/03/22 08:27:00 08/04/22 11:08:00 27.1 31.9 15.0 256 47 37   03/30/22 17:38:00 03/30/22 18:08:25 28.1 32.6 15.0 High  270 65 23   11/23/21 09:41:00 11/23/21 11:39:27 27.3 30.6 Low  15.5 High  295 52 35   07/24/21 01:28:00 07/24/21 02:08:00 27.3 31.4 15.4 High  267 72 17 Low          Final result              Radiologic Studies -   No orders to display     [unfilled]  [unfilled]    Medications given in the ED-  Medications   dexamethasone (DECADRON) injection 4 mg (4 mg IntraMUSCular Given 11/24/23 0054)   methocarbamol (ROBAXIN) tablet 1,000 mg (1,000 mg Oral Given 11/24/23 0054)   HYDROcodone-acetaminophen (NORCO) 5-325 MG per tablet 1 tablet (1 tablet Oral Given 11/24/23 0054)         Medical Decision Making   I am the first provider for this patient. I reviewed the vital signs, available nursing notes, past medical history, past surgical history, family history and social history. Vital Signs-Reviewed the patient's vital signs. Pulse Oximetry Analysis - 99% on room air    Cardiac Monitor:  Rate: 88 bpm  Rhythm: Sinus rhythm      Records Reviewed: NURSING NOTES AND PREVIOUS MEDICAL RECORDS    Provider Notes (Medical Decision Making): This is acute condition of moderate complexity severity. Patient presents with vaginal bleeding history of same before in the past.  She is already known to have postmenopausal bleeding scheduled for hysteroscopy to reevaluate for potential malignancy. Findings here heart rate blood pressure stable. Examination finds no dangerous bleeding. She is not soaking pads nor doing so in multiple hours. Hematocrit and hemoglobin numbers look good.   Medications given for pain mostly

## 2023-12-27 ENCOUNTER — HOSPITAL ENCOUNTER (EMERGENCY)
Facility: HOSPITAL | Age: 68
Discharge: HOME | End: 2023-12-27
Payer: MEDICARE

## 2023-12-27 ENCOUNTER — APPOINTMENT (OUTPATIENT)
Facility: HOSPITAL | Age: 68
End: 2023-12-27
Attending: EMERGENCY MEDICINE
Payer: MEDICARE

## 2023-12-27 ENCOUNTER — APPOINTMENT (OUTPATIENT)
Facility: HOSPITAL | Age: 68
End: 2023-12-27
Payer: MEDICARE

## 2023-12-27 VITALS
TEMPERATURE: 97.9 F | WEIGHT: 285 LBS | BODY MASS INDEX: 53.81 KG/M2 | HEART RATE: 75 BPM | DIASTOLIC BLOOD PRESSURE: 70 MMHG | HEIGHT: 61 IN | SYSTOLIC BLOOD PRESSURE: 155 MMHG | RESPIRATION RATE: 16 BRPM | OXYGEN SATURATION: 98 %

## 2023-12-27 DIAGNOSIS — M16.11 OSTEOARTHRITIS OF RIGHT HIP, UNSPECIFIED OSTEOARTHRITIS TYPE: ICD-10-CM

## 2023-12-27 DIAGNOSIS — M17.11 OSTEOARTHRITIS OF RIGHT KNEE, UNSPECIFIED OSTEOARTHRITIS TYPE: Primary | ICD-10-CM

## 2023-12-27 LAB — ECHO BSA: 2.36 M2

## 2023-12-27 PROCEDURE — 73562 X-RAY EXAM OF KNEE 3: CPT

## 2023-12-27 PROCEDURE — 6370000000 HC RX 637 (ALT 250 FOR IP): Performed by: PHYSICIAN ASSISTANT

## 2023-12-27 PROCEDURE — 73502 X-RAY EXAM HIP UNI 2-3 VIEWS: CPT

## 2023-12-27 PROCEDURE — 99284 EMERGENCY DEPT VISIT MOD MDM: CPT

## 2023-12-27 PROCEDURE — 93971 EXTREMITY STUDY: CPT

## 2023-12-27 RX ORDER — IBUPROFEN 600 MG/1
600 TABLET ORAL EVERY 6 HOURS PRN
Qty: 30 TABLET | Refills: 0 | Status: SHIPPED | OUTPATIENT
Start: 2023-12-27

## 2023-12-27 RX ORDER — IBUPROFEN 600 MG/1
600 TABLET ORAL
Status: COMPLETED | OUTPATIENT
Start: 2023-12-27 | End: 2023-12-27

## 2023-12-27 RX ORDER — OXYCODONE HYDROCHLORIDE AND ACETAMINOPHEN 5; 325 MG/1; MG/1
1 TABLET ORAL
Status: COMPLETED | OUTPATIENT
Start: 2023-12-27 | End: 2023-12-27

## 2023-12-27 RX ADMIN — IBUPROFEN 600 MG: 600 TABLET, FILM COATED ORAL at 10:41

## 2023-12-27 RX ADMIN — OXYCODONE HYDROCHLORIDE AND ACETAMINOPHEN 1 TABLET: 5; 325 TABLET ORAL at 10:41

## 2023-12-27 NOTE — DISCHARGE INSTRUCTIONS
Your Doppler does not show blood clot  Your x-rays do show significant arthritis of your hip and knee. Knee x-ray shows bone rubbing on bone on the inner aspect of your knee. This is most likely cause of your pain today  Ice, elevation, Motrin as prescribed. Take with food. Do not take meloxicam while taking Motrin.   Tylenol, 325 mg, 2 every 6-8 hours as needed  Can also use topical Voltaren, rubbed to your knee as needed  Activity as tolerated  Important you follow-up with orthopedist as further investigation and treatment may be needed  Return to ER if any new or worsening symptoms or new concerns

## 2024-02-21 ENCOUNTER — HOSPITAL ENCOUNTER (EMERGENCY)
Facility: HOSPITAL | Age: 69
Discharge: HOME | End: 2024-02-22
Attending: STUDENT IN AN ORGANIZED HEALTH CARE EDUCATION/TRAINING PROGRAM
Payer: MEDICARE

## 2024-02-21 VITALS
TEMPERATURE: 98.4 F | DIASTOLIC BLOOD PRESSURE: 85 MMHG | OXYGEN SATURATION: 100 % | SYSTOLIC BLOOD PRESSURE: 155 MMHG | HEART RATE: 80 BPM | RESPIRATION RATE: 16 BRPM

## 2024-02-21 DIAGNOSIS — N93.9 ABNORMAL VAGINAL BLEEDING: Primary | ICD-10-CM

## 2024-02-21 DIAGNOSIS — N84.0 ENDOMETRIAL POLYP: ICD-10-CM

## 2024-02-21 LAB
ANION GAP SERPL CALC-SCNC: 4 MMOL/L (ref 3–18)
APTT PPP: 25.2 SEC (ref 23–36.4)
BASOPHILS # BLD: 0 K/UL (ref 0–0.1)
BASOPHILS NFR BLD: 0 % (ref 0–2)
BUN SERPL-MCNC: 14 MG/DL (ref 7–18)
BUN/CREAT SERPL: 18 (ref 12–20)
CALCIUM SERPL-MCNC: 8.9 MG/DL (ref 8.5–10.1)
CHLORIDE SERPL-SCNC: 110 MMOL/L (ref 100–111)
CO2 SERPL-SCNC: 28 MMOL/L (ref 21–32)
CREAT SERPL-MCNC: 0.76 MG/DL (ref 0.6–1.3)
DIFFERENTIAL METHOD BLD: ABNORMAL
EOSINOPHIL # BLD: 0.4 K/UL (ref 0–0.4)
EOSINOPHIL NFR BLD: 5 % (ref 0–5)
ERYTHROCYTE [DISTWIDTH] IN BLOOD BY AUTOMATED COUNT: 15.7 % (ref 11.6–14.5)
GLUCOSE SERPL-MCNC: 126 MG/DL (ref 74–99)
HCT VFR BLD AUTO: 36 % (ref 35–45)
HGB BLD-MCNC: 11.5 G/DL (ref 12–16)
IMM GRANULOCYTES # BLD AUTO: 0 K/UL (ref 0–0.04)
IMM GRANULOCYTES NFR BLD AUTO: 0 % (ref 0–0.5)
INR PPP: 1 (ref 0.9–1.1)
LYMPHOCYTES # BLD: 2.8 K/UL (ref 0.9–3.6)
LYMPHOCYTES NFR BLD: 33 % (ref 21–52)
MCH RBC QN AUTO: 28.5 PG (ref 24–34)
MCHC RBC AUTO-ENTMCNC: 31.9 G/DL (ref 31–37)
MCV RBC AUTO: 89.3 FL (ref 78–100)
MONOCYTES # BLD: 0.6 K/UL (ref 0.05–1.2)
MONOCYTES NFR BLD: 7 % (ref 3–10)
NEUTS SEG # BLD: 4.6 K/UL (ref 1.8–8)
NEUTS SEG NFR BLD: 54 % (ref 40–73)
NRBC # BLD: 0 K/UL (ref 0–0.01)
NRBC BLD-RTO: 0 PER 100 WBC
PLATELET # BLD AUTO: 275 K/UL (ref 135–420)
PMV BLD AUTO: 10.2 FL (ref 9.2–11.8)
POTASSIUM SERPL-SCNC: 3.9 MMOL/L (ref 3.5–5.5)
PROTHROMBIN TIME: 13.6 SEC (ref 11.9–14.7)
RBC # BLD AUTO: 4.03 M/UL (ref 4.2–5.3)
SODIUM SERPL-SCNC: 142 MMOL/L (ref 136–145)
WBC # BLD AUTO: 8.5 K/UL (ref 4.6–13.2)

## 2024-02-21 PROCEDURE — 99283 EMERGENCY DEPT VISIT LOW MDM: CPT

## 2024-02-21 PROCEDURE — 85610 PROTHROMBIN TIME: CPT

## 2024-02-21 PROCEDURE — 85025 COMPLETE CBC W/AUTO DIFF WBC: CPT

## 2024-02-21 PROCEDURE — 80048 BASIC METABOLIC PNL TOTAL CA: CPT

## 2024-02-21 PROCEDURE — 85730 THROMBOPLASTIN TIME PARTIAL: CPT

## 2024-02-21 ASSESSMENT — PAIN SCALES - GENERAL: PAINLEVEL_OUTOF10: 0

## 2024-02-21 ASSESSMENT — PAIN - FUNCTIONAL ASSESSMENT: PAIN_FUNCTIONAL_ASSESSMENT: 0-10

## 2024-02-22 NOTE — ED PROVIDER NOTES
[facility] EMERGENCY DEPT  EMERGENCY DEPARTMENT ENCOUNTER    Patient Name: Corrine Del Valle  MRN: [id number]  YOB: 1955  ED Physician: Salvatore Saul MD  PCP: Leonora Miles MD  Time/Date of evaluation: 10:30 PM EST on 2/21/24    History of Presenting Illness     Chief Complaint   Patient presents with    Vaginal Bleeding     Patient states that she has been experiencing vaginal bleeding since monday. Patient states she is now passing clots. Patient states that she had a procedure done back in January for the same thing.        History Provided by: Patient   History is limited by: Nothing    HISTORY (Narative):   68 y.o. female complaining of 2 to 3 days of vaginal bleeding.  She reports having to wear disposable adult diapers and occasionally double stacking with feminine pad.  She has occasionally bled through her clothing.  She is seeking emergent evaluation this evening as she believes the bleeding has intensified over the course of today.  She denies any associated lightheadedness, dizziness, chest pain, dyspnea, or syncope.  She denies any abdominal pain, or dysuria.    Pt underwent hysteroscopy, myosure w/ dilation and curettage by Dr. Norman on 15 Mckay 2024. Endometrial polyps were discovered. Pt was prescribed methylprednisolone. She started taking this medication approximately one week ago.     The patient denies any prescription anticoagulants.    The patient does reaffirm she is a [facility] patient and would not consent to blood transfusion if medically indicated.    Nursing Notes were all reviewed and agreed with or any disagreements were addressed in the HPI.    Past History     PAST MEDICAL HISTORY:  Past Medical History:   Diagnosis Date    Arthritis     hands, meloxicam as needed    Cataract 2023    bilateral-Dr. Sosa    Diabetes (HCC) 2009    Type II DM    Edema     furosemide & spironolactone    Exercise tolerance finding 07/13/2023    uses a walker/cane. Denies SOB/chest pain.  time of this note:    No orders to display       Procedures     Procedures    ED Course     10:30 PM NICOLA CADENA (Salvatore Saul MD) am the first ED physician for this patient. Initial assessment performed. I reviewed the vital signs, available nursing notes, past medical history, past surgical history, family history and social history. The patients presenting problems have been discussed, and they are in agreement with the care plan formulated and outlined with them.  I have encouraged them to ask questions as they arise throughout their visit.    RECORDS REVIEWED: Nursing Notes and Clinical Records from a Different Specialty (GYN)    Is this patient to be included in the SEP-1 core measure? No Exclusion criteria - the patient is NOT to be included for SEP-1 Core Measure due to: Infection is not suspected    MEDICATIONS ADMINISTERED IN THE ED:  Medications - No data to display         None    Medical Decision Making     SCREENING TOOLS:  None    CLINICAL MANAGEMENT TOOLS:  Not Applicable    DDX: Functional Uterine Bleeding, Uterine Polyp, Acute Blood Loss Anemia,     DISCUSSION:  This appears to be a mild contion. This appears to be a chronic condition.      68 y.o. female presenting with multiple days of vaginal bleeding with initiation of methylprednisolone in setting of known uterine polyps with history of bleeding.  Triage vital signs are unremarkable for tachycardia, hypotension, or hypoxemia on room air.  The patient's physical exam does not suggest an external source of bleeding.  She is grossly well-appearing.  The patient's hemoglobin is documented baseline.  Her PT/INR and PTT are not elevated to suggest coagulopathy.  No electrolyte derangement.    The patient and I discussed her unremarkable laboratory values, reassuring vital signs, and reassuring physical exam.  I encouraged her to continue taking her methylprednisolone and to call her OB/GYN office first thing in the morning to discuss close follow-up.  I

## 2024-02-22 NOTE — DISCHARGE INSTRUCTIONS
As we discussed, your vital signs and blood count look normal tonight.    Please continue take your methylprednisolone as prescribed by your GYN doctor.    You should call your GYN doctors office first thing in the morning to discuss arranging follow-up.  You may continue to have some bleeding.  Return to the emergency department if you develop lightheadedness, dizziness, shortness of breath, chest pain, or you pass out.

## 2024-03-08 RX ORDER — FAMOTIDINE 20 MG/1
20 TABLET, FILM COATED ORAL DAILY
COMMUNITY
Start: 2023-11-21 | End: 2024-11-20

## 2024-03-08 RX ORDER — HYDROCHLOROTHIAZIDE 12.5 MG/1
12.5 TABLET ORAL DAILY
COMMUNITY
Start: 2023-11-21 | End: 2024-11-15

## 2024-03-08 RX ORDER — PREDNISONE 20 MG/1
TABLET ORAL
COMMUNITY
Start: 2023-12-30 | End: 2024-03-11

## 2024-03-08 RX ORDER — METHOCARBAMOL 750 MG/1
TABLET, FILM COATED ORAL
COMMUNITY
Start: 2023-12-30

## 2024-03-08 RX ORDER — MEDROXYPROGESTERONE ACETATE 5 MG/1
5 TABLET ORAL DAILY
COMMUNITY
Start: 2024-01-26 | End: 2025-01-25

## 2024-03-08 RX ORDER — OFLOXACIN 3 MG/ML
1 SOLUTION/ DROPS OPHTHALMIC 4 TIMES DAILY
COMMUNITY
Start: 2023-07-03 | End: 2024-03-11

## 2024-03-11 ENCOUNTER — OFFICE VISIT (OUTPATIENT)
Age: 69
End: 2024-03-11
Payer: MEDICARE

## 2024-03-11 ENCOUNTER — HOSPITAL ENCOUNTER (OUTPATIENT)
Facility: HOSPITAL | Age: 69
Setting detail: SPECIMEN
Discharge: HOME | End: 2024-03-14

## 2024-03-11 VITALS
OXYGEN SATURATION: 99 % | HEIGHT: 61 IN | SYSTOLIC BLOOD PRESSURE: 148 MMHG | WEIGHT: 282 LBS | HEART RATE: 71 BPM | BODY MASS INDEX: 53.24 KG/M2 | TEMPERATURE: 98.5 F | RESPIRATION RATE: 18 BRPM | DIASTOLIC BLOOD PRESSURE: 82 MMHG

## 2024-03-11 DIAGNOSIS — K75.4 AUTOIMMUNE HEPATITIS (HCC): Primary | ICD-10-CM

## 2024-03-11 DIAGNOSIS — K75.4 AUTOIMMUNE HEPATITIS (HCC): ICD-10-CM

## 2024-03-11 LAB — LABCORP SPECIMEN COLLECTION: NORMAL

## 2024-03-11 PROCEDURE — 3079F DIAST BP 80-89 MM HG: CPT | Performed by: NURSE PRACTITIONER

## 2024-03-11 PROCEDURE — 99001 SPECIMEN HANDLING PT-LAB: CPT

## 2024-03-11 PROCEDURE — 99213 OFFICE O/P EST LOW 20 MIN: CPT | Performed by: NURSE PRACTITIONER

## 2024-03-11 PROCEDURE — 3077F SYST BP >= 140 MM HG: CPT | Performed by: NURSE PRACTITIONER

## 2024-03-11 PROCEDURE — 1123F ACP DISCUSS/DSCN MKR DOCD: CPT | Performed by: NURSE PRACTITIONER

## 2024-03-11 NOTE — PROGRESS NOTES
reliable.    ENDOSCOPIC PROCEDURES:  Not available or performed    RADIOLOGY:  6/2020. Ultrasound of liver.  Echogenic consistent with chronic liver disease. No liver mass lesions. No dilated bile ducts. No ascites.    7/2021. CT scan abdomen with IV contrast. Normal appearing liver. No liver mass lesions.  Normal spleen. No ascites.      OTHER TESTING:  Not available or performed      FOLLOW-UP:  All of the issues listed above in the Assessment and Plan were discussed with the patient.  All questions were answered.  The patient expressed a clear understanding of the above.    Follow-up [facility] in 6 months for routine monitoring.       SHABBIR Irizarry-BC  [facility]  [address], [address]  [place]  [address] [phone]

## 2024-03-12 LAB
ALBUMIN SERPL-MCNC: 3.9 G/DL (ref 3.9–4.9)
ALP SERPL-CCNC: 91 IU/L (ref 44–121)
ALT SERPL-CCNC: 8 IU/L (ref 0–32)
AST SERPL-CCNC: 11 IU/L (ref 0–40)
BASOPHILS # BLD AUTO: 0 X10E3/UL (ref 0–0.2)
BASOPHILS NFR BLD AUTO: 0 %
BILIRUB DIRECT SERPL-MCNC: 0.12 MG/DL (ref 0–0.4)
BILIRUB SERPL-MCNC: 0.5 MG/DL (ref 0–1.2)
BUN SERPL-MCNC: 15 MG/DL (ref 8–27)
BUN/CREAT SERPL: 22 (ref 12–28)
CALCIUM SERPL-MCNC: 8.8 MG/DL (ref 8.7–10.3)
CHLORIDE SERPL-SCNC: 102 MMOL/L (ref 96–106)
CO2 SERPL-SCNC: 21 MMOL/L (ref 20–29)
CREAT SERPL-MCNC: 0.68 MG/DL (ref 0.57–1)
EGFRCR SERPLBLD CKD-EPI 2021: 94 ML/MIN/1.73
EOSINOPHIL # BLD AUTO: 0.2 X10E3/UL (ref 0–0.4)
EOSINOPHIL NFR BLD AUTO: 3 %
ERYTHROCYTE [DISTWIDTH] IN BLOOD BY AUTOMATED COUNT: 14.6 % (ref 11.7–15.4)
GLUCOSE SERPL-MCNC: 126 MG/DL (ref 70–99)
HCT VFR BLD AUTO: 33.6 % (ref 34–46.6)
HGB BLD-MCNC: 11 G/DL (ref 11.1–15.9)
IMM GRANULOCYTES # BLD AUTO: 0 X10E3/UL (ref 0–0.1)
IMM GRANULOCYTES NFR BLD AUTO: 0 %
LYMPHOCYTES # BLD AUTO: 2 X10E3/UL (ref 0.7–3.1)
LYMPHOCYTES NFR BLD AUTO: 29 %
MCH RBC QN AUTO: 28.9 PG (ref 26.6–33)
MCHC RBC AUTO-ENTMCNC: 32.7 G/DL (ref 31.5–35.7)
MCV RBC AUTO: 88 FL (ref 79–97)
MONOCYTES # BLD AUTO: 0.6 X10E3/UL (ref 0.1–0.9)
MONOCYTES NFR BLD AUTO: 8 %
NEUTROPHILS # BLD AUTO: 4.1 X10E3/UL (ref 1.4–7)
NEUTROPHILS NFR BLD AUTO: 60 %
PLATELET # BLD AUTO: 340 X10E3/UL (ref 150–450)
POTASSIUM SERPL-SCNC: 4.5 MMOL/L (ref 3.5–5.2)
PROT SERPL-MCNC: 7.6 G/DL (ref 6–8.5)
RBC # BLD AUTO: 3.8 X10E6/UL (ref 3.77–5.28)
SODIUM SERPL-SCNC: 139 MMOL/L (ref 134–144)
SPECIMEN STATUS REPORT: NORMAL
WBC # BLD AUTO: 6.9 X10E3/UL (ref 3.4–10.8)

## 2024-03-30 ENCOUNTER — TELEPHONE (OUTPATIENT)
Age: 69
End: 2024-03-30

## 2024-03-30 NOTE — TELEPHONE ENCOUNTER
3/30/2024    Edmundo advising as directed and stated next OV details.  efs        ----- Message from MURPHY Irizarry NP sent at 3/13/2024  3:43 PM EDT -----  Labs reviewed. All labs were either normal and/or abnormal values were not clinically meaningful to patient's current visit

## 2024-08-07 ENCOUNTER — ANESTHESIA EVENT (OUTPATIENT)
Facility: HOSPITAL | Age: 69
End: 2024-08-07
Payer: MEDICARE

## 2024-08-07 ENCOUNTER — HOSPITAL ENCOUNTER (OUTPATIENT)
Facility: HOSPITAL | Age: 69
Setting detail: OUTPATIENT SURGERY
Discharge: HOME | End: 2024-08-07
Attending: OPHTHALMOLOGY | Admitting: OPHTHALMOLOGY
Payer: MEDICARE

## 2024-08-07 ENCOUNTER — ANESTHESIA (OUTPATIENT)
Facility: HOSPITAL | Age: 69
End: 2024-08-07
Payer: MEDICARE

## 2024-08-07 VITALS
BODY MASS INDEX: 52 KG/M2 | TEMPERATURE: 97.4 F | HEIGHT: 61 IN | HEART RATE: 70 BPM | RESPIRATION RATE: 16 BRPM | WEIGHT: 275.4 LBS | OXYGEN SATURATION: 100 % | DIASTOLIC BLOOD PRESSURE: 58 MMHG | SYSTOLIC BLOOD PRESSURE: 149 MMHG

## 2024-08-07 PROBLEM — H26.9 CATARACT: Status: ACTIVE | Noted: 2024-08-07

## 2024-08-07 LAB — GLUCOSE BLD STRIP.AUTO-MCNC: 135 MG/DL (ref 70–110)

## 2024-08-07 PROCEDURE — 3600000012 HC SURGERY LEVEL 2 ADDTL 15MIN: Performed by: OPHTHALMOLOGY

## 2024-08-07 PROCEDURE — 7100000011 HC PHASE II RECOVERY - ADDTL 15 MIN: Performed by: OPHTHALMOLOGY

## 2024-08-07 PROCEDURE — 6360000002 HC RX W HCPCS: Performed by: REGISTERED NURSE

## 2024-08-07 PROCEDURE — 3700000000 HC ANESTHESIA ATTENDED CARE: Performed by: OPHTHALMOLOGY

## 2024-08-07 PROCEDURE — V2632 POST CHMBR INTRAOCULAR LENS: HCPCS | Performed by: OPHTHALMOLOGY

## 2024-08-07 PROCEDURE — 2500000003 HC RX 250 WO HCPCS: Performed by: OPHTHALMOLOGY

## 2024-08-07 PROCEDURE — 2720000010 HC SURG SUPPLY STERILE: Performed by: OPHTHALMOLOGY

## 2024-08-07 PROCEDURE — 2580000003 HC RX 258: Performed by: OPHTHALMOLOGY

## 2024-08-07 PROCEDURE — 6370000000 HC RX 637 (ALT 250 FOR IP): Performed by: OPHTHALMOLOGY

## 2024-08-07 PROCEDURE — 82962 GLUCOSE BLOOD TEST: CPT

## 2024-08-07 PROCEDURE — 3700000001 HC ADD 15 MINUTES (ANESTHESIA): Performed by: OPHTHALMOLOGY

## 2024-08-07 PROCEDURE — 2709999900 HC NON-CHARGEABLE SUPPLY: Performed by: OPHTHALMOLOGY

## 2024-08-07 PROCEDURE — 3600000002 HC SURGERY LEVEL 2 BASE: Performed by: OPHTHALMOLOGY

## 2024-08-07 PROCEDURE — 7100000010 HC PHASE II RECOVERY - FIRST 15 MIN: Performed by: OPHTHALMOLOGY

## 2024-08-07 RX ORDER — OFLOXACIN 3 MG/ML
1 SOLUTION/ DROPS OPHTHALMIC EVERY 30 MIN PRN
Status: DISCONTINUED | OUTPATIENT
Start: 2024-08-07 | End: 2024-08-07 | Stop reason: HOSPADM

## 2024-08-07 RX ORDER — BALANCED SALT SOLUTION 6.4; .75; .48; .3; 3.9; 1.7 MG/ML; MG/ML; MG/ML; MG/ML; MG/ML; MG/ML
SOLUTION OPHTHALMIC PRN
Status: DISCONTINUED | OUTPATIENT
Start: 2024-08-07 | End: 2024-08-07 | Stop reason: ALTCHOICE

## 2024-08-07 RX ORDER — LIDOCAINE HYDROCHLORIDE 10 MG/ML
INJECTION, SOLUTION EPIDURAL; INFILTRATION; INTRACAUDAL; PERINEURAL PRN
Status: DISCONTINUED | OUTPATIENT
Start: 2024-08-07 | End: 2024-08-07 | Stop reason: ALTCHOICE

## 2024-08-07 RX ORDER — PHENYLEPHRINE HYDROCHLORIDE 25 MG/ML
1 SOLUTION/ DROPS OPHTHALMIC
Status: DISPENSED | OUTPATIENT
Start: 2024-08-07 | End: 2024-08-07

## 2024-08-07 RX ORDER — SODIUM CHLORIDE, POTASSIUM CHLORIDE, CALCIUM CHLORIDE, MAGNESIUM CHLORIDE, SODIUM ACETATE, AND SODIUM CITRATE 6.4; .75; .48; .3; 3.9; 1.7 MG/ML; MG/ML; MG/ML; MG/ML; MG/ML; MG/ML
SOLUTION IRRIGATION PRN
Status: DISCONTINUED | OUTPATIENT
Start: 2024-08-07 | End: 2024-08-07 | Stop reason: ALTCHOICE

## 2024-08-07 RX ORDER — SODIUM CHLORIDE, SODIUM LACTATE, POTASSIUM CHLORIDE, CALCIUM CHLORIDE 600; 310; 30; 20 MG/100ML; MG/100ML; MG/100ML; MG/100ML
INJECTION, SOLUTION INTRAVENOUS CONTINUOUS
Status: DISCONTINUED | OUTPATIENT
Start: 2024-08-07 | End: 2024-08-07 | Stop reason: HOSPADM

## 2024-08-07 RX ORDER — MIDAZOLAM HYDROCHLORIDE 1 MG/ML
INJECTION INTRAMUSCULAR; INTRAVENOUS PRN
Status: DISCONTINUED | OUTPATIENT
Start: 2024-08-07 | End: 2024-08-07 | Stop reason: SDUPTHER

## 2024-08-07 RX ORDER — FENTANYL CITRATE 50 UG/ML
INJECTION, SOLUTION INTRAMUSCULAR; INTRAVENOUS PRN
Status: DISCONTINUED | OUTPATIENT
Start: 2024-08-07 | End: 2024-08-07 | Stop reason: SDUPTHER

## 2024-08-07 RX ORDER — ONDANSETRON 2 MG/ML
INJECTION INTRAMUSCULAR; INTRAVENOUS PRN
Status: DISCONTINUED | OUTPATIENT
Start: 2024-08-07 | End: 2024-08-07 | Stop reason: SDUPTHER

## 2024-08-07 RX ORDER — TROPICAMIDE 10 MG/ML
1 SOLUTION/ DROPS OPHTHALMIC
Status: DISPENSED | OUTPATIENT
Start: 2024-08-07 | End: 2024-08-07

## 2024-08-07 RX ADMIN — SODIUM CHLORIDE, POTASSIUM CHLORIDE, SODIUM LACTATE AND CALCIUM CHLORIDE: 600; 310; 30; 20 INJECTION, SOLUTION INTRAVENOUS at 07:19

## 2024-08-07 RX ADMIN — TROPICAMIDE 1 DROP: 10 SOLUTION/ DROPS OPHTHALMIC at 07:25

## 2024-08-07 RX ADMIN — FENTANYL CITRATE 50 MCG: 50 INJECTION, SOLUTION INTRAMUSCULAR; INTRAVENOUS at 08:06

## 2024-08-07 RX ADMIN — PHENYLEPHRINE HYDROCHLORIDE 1 DROP: 2.5 SOLUTION/ DROPS OPHTHALMIC at 07:25

## 2024-08-07 RX ADMIN — FENTANYL CITRATE 50 MCG: 50 INJECTION, SOLUTION INTRAMUSCULAR; INTRAVENOUS at 08:14

## 2024-08-07 RX ADMIN — PHENYLEPHRINE HYDROCHLORIDE 1 DROP: 2.5 SOLUTION/ DROPS OPHTHALMIC at 07:15

## 2024-08-07 RX ADMIN — PHENYLEPHRINE HYDROCHLORIDE 1 DROP: 2.5 SOLUTION/ DROPS OPHTHALMIC at 07:20

## 2024-08-07 RX ADMIN — LIDOCAINE HYDROCHLORIDE ANHYDROUS: 35 GEL OPHTHALMIC at 07:33

## 2024-08-07 RX ADMIN — MIDAZOLAM 2 MG: 1 INJECTION INTRAMUSCULAR; INTRAVENOUS at 08:06

## 2024-08-07 RX ADMIN — OFLOXACIN 1 DROP: 3 SOLUTION/ DROPS OPHTHALMIC at 07:15

## 2024-08-07 RX ADMIN — TROPICAMIDE 1 DROP: 10 SOLUTION/ DROPS OPHTHALMIC at 07:15

## 2024-08-07 RX ADMIN — ONDANSETRON HYDROCHLORIDE 4 MG: 2 INJECTION INTRAMUSCULAR; INTRAVENOUS at 08:07

## 2024-08-07 RX ADMIN — TROPICAMIDE 1 DROP: 10 SOLUTION/ DROPS OPHTHALMIC at 07:20

## 2024-08-07 RX ADMIN — MIDAZOLAM 1 MG: 1 INJECTION INTRAMUSCULAR; INTRAVENOUS at 08:14

## 2024-08-07 ASSESSMENT — PAIN SCALES - GENERAL: PAINLEVEL_OUTOF10: 0

## 2024-08-07 ASSESSMENT — LIFESTYLE VARIABLES: SMOKING_STATUS: 0

## 2024-08-07 ASSESSMENT — PAIN - FUNCTIONAL ASSESSMENT: PAIN_FUNCTIONAL_ASSESSMENT: 0-10

## 2024-08-07 NOTE — PERIOP NOTE
Reviewed PTA medication list with patient/caregiver and patient/caregiver denies any additional medications.     Patient admits to having a responsible adult care for them at home for at least 24 hours after surgery.    Patient encouraged to use gown warming system and informed that using said warming gown to regulate body temperature prior to a procedure has been shown to help reduce the risks of blood clots and infection.    Patient's pharmacy of choice verified and documented in PTA medication section.    Dual skin assessment & fall risk band verification completed with A Corey LONG.

## 2024-08-07 NOTE — ANESTHESIA POSTPROCEDURE EVALUATION
Department of Anesthesiology  Postprocedure Note    Patient: Corrine Del Valle  MRN: [id number]  YOB: 1955  Date of evaluation: 8/7/2024    Procedure Summary       Date: 08/07/24 Room / Location: [facility] MAIN 02 / [facility] MAIN OR    Anesthesia Start: 0803 Anesthesia Stop: 0824    Procedure: EYE CATARACT EXTRACTION WITH INTRAOCULAR LENS IMPLANT- RIGHT EYE (Right: Eye) Diagnosis:       Nuclear sclerotic cataract of right eye      Cortical age-related cataract of right eye      Posterior subcapsular polar age-related cataract of right eye      (Nuclear sclerotic cataract of right eye [H25.11])      (Cortical age-related cataract of right eye [H25.011])      (Posterior subcapsular polar age-related cataract of right eye [H25.041])    Surgeons: Agustin Sosa MD Responsible Provider: Venu August MD    Anesthesia Type: MAC ASA Status: 3            Anesthesia Type: MAC    Darci Phase I: Darci Score: 10    Darci Phase II: Darci Score: 10    Anesthesia Post Evaluation    Patient location during evaluation: bedside  Patient participation: complete - patient participated  Level of consciousness: awake and alert  Pain score: 0  Airway patency: patent  Nausea & Vomiting: no nausea and no vomiting  Cardiovascular status: blood pressure returned to baseline  Respiratory status: acceptable  Hydration status: euvolemic    No notable events documented.

## 2024-08-07 NOTE — DISCHARGE INSTRUCTIONS
Post-Operative Cataract Instructions  [facility]  Agustin Paez M.D.  Teofilo Saeed M.D.  [address]  [place] [address] [phone]      Diet  Resume normal diet.  Do not drink alcoholic beverages, including beer for 24 hours.    Activity  Do not drive a car or operate any hazardous machinery the day of surgery.  You may resume normal activities as tolerated.  No bending or heavy lifting.  No reading or computer work after your surgery.  You may watch TV.    Wound Care  Anticipate that your eye will tear and water.  You may also experience a sensation of a foreign body, sand, or grit in the surgical eye, this is normal.  Do not touch the affected eye.  ** Do not remove eye shield unless directed to do so by your physician. **  Wear eye shield when resting or sleeping for one week.    Medications  Take Tylenol Extra Strength two (2) tablets by mouth upon arrival home and then every four (4) hours as needed for discomfort.  Regarding Eye drops:  -  Begin using your eye drops as directed by your physician in the (right) operative eye.     One drop of     []   Zymar    []  Vigamox   along with one (1) drop     []  Acular    []  Voltaren   every four (4) hours while awake.     Wait five (5) minutes then apply one (1) drop     []  Pred Forte    []  Econopred Plus   every four (4) hours while awake.          If you take glaucoma medications, continue to do so unless the physician otherwise instructs you.  You may use artificial tears as needed if your eye feels scratchy.    Notify your Physician Immediately for any of the following:  Excessive pain not relieved by Tylenol especially headache or increasing pressure to the operative eye.  Persistent nausea lasting more than eight hours.  If any vomiting occurs.    If any questions or concerns arise, call your Surgeon at [phone].       Sedation: Care Instructions  Overview     Sedation is the use of medicine to

## 2024-08-07 NOTE — INTERVAL H&P NOTE
Update History & Physical    The patient's History and Physical of August 7, 2024 was reviewed with the patient and I examined the patient. There was no change. The surgical site was confirmed by the patient and me.     Plan: The risks, benefits, expected outcome, and alternative to the recommended procedure have been discussed with the patient. Patient understands and wants to proceed with the procedure.     Electronically signed by ESTELA NAILS MD on 8/7/2024 at 7:18 AM

## 2024-08-07 NOTE — PERIOP NOTE
Reviewed discahrge plan of care with patient and her daughter. Written instructions provided as well. Discharge with eye kit

## 2024-08-07 NOTE — OP NOTE
Cataract Operative Note      Patient: Corrine Del Valle               Sex: female          DOA: 8/7/2024         YOB: 1955      Age:  68 y.o.        LOS:  LOS: 0 days     Preoperative Diagnosis: Cataract right eye    Postoperative Diagnosis:  Cataract  right eye  Surgeon: ESTELA NAILS MD, M.D.  Anesthesia:  Topical anesthesia  Procedure:  Phacoemulsification of posterior chamber for intraocular lens implantation right    Fluids:  0    Procedure in Detail: The operative eye was prepped and draped in the usual fashion.  A lid speculum was placed in the operative eye.  A clear cornea approach was utilized.  A paracentesis incision(s) was constructed with a 1 mm slit knife.  One percent preservative-free lidocaine followed by viscoelastic was instilled into the anterior chamber.  A clear corneal incision was made with a slit knife.  A continuous curvilinear capsulorrhexis was constructed followed by hydrodissection.  A phacoemulsification tip was placed into the eye, and the lens nucleus was emulsified.  The irrigation/aspiration device was then used to remove any remaining cortical material.  Polishing of the capsule was performed as needed.  The intraocular lens was then placed into the capsular bag after it was re-inflated with viscoelastic.  The remaining viscoelastic was then removed using the irrigation/aspiration device.  BSS on a cannula was then used to hydrate the wound edges.  At the end of the procedure the wound was found to be watertight, the anterior chamber was deep and the pupil round.  No blood loss during surgery.  An antibiotic and anti-inflammatroy was placed into the operative eye.  The lid speculum was removed.  Protective sunglasses were then placed onto the patient.  The patient was taken to the Post Anesthesia Care Unit (PACU) in good condition having tolerated the procedure well.    Estimated Blood Loss: 0                 Implants:   Implant Name Type Inv. Item Serial No.

## 2024-08-07 NOTE — ANESTHESIA PRE PROCEDURE
Department of Anesthesiology  Preprocedure Note       Name:  Corrine Del Valle   Age:  68 y.o.  :  1955                                          MRN:  [id number]         Date:  2024      Surgeon: Surgeon(s):  Agustin Sosa MD    Procedure: Procedure(s):  EYE CATARACT EXTRACTION WITH INTRAOCULAR LENS IMPLANT- RIGHT EYE    Medications prior to admission:   Prior to Admission medications    Medication Sig Start Date End Date Taking? Authorizing Provider   Albuterol Sulfate, sensor, 108 (90 Base) MCG/ACT AEPB Inhale into the lungs Indications: Wheezing    ProviderEmily MD   metoprolol succinate (TOPROL XL) 50 MG extended release tablet Take 1 tablet by mouth daily    Emily James MD   pantoprazole (PROTONIX) 40 MG tablet Take 1 tablet by mouth daily    Emily James MD   acetaminophen (TYLENOL) 500 MG tablet Take 1 tablet by mouth every 6 hours as needed for Pain    Emily James MD   Potassium Chloride (KLOR-CON 10 PO) Take by mouth    Emily James MD   Dulaglutide 0.75 MG/0.5ML SOPN Inject 0.5 mLs into the skin once a week Indications: Diabetes 23   Emily James MD   hydroCHLOROthiazide 12.5 MG tablet Take 1 tablet by mouth daily 11/21/23 11/15/24  Emily James MD   amLODIPine (NORVASC) 10 MG tablet Take 1 tablet by mouth at bedtime Indications: HTN    Emily James MD   aspirin 81 MG EC tablet Take by mouth daily    Automatic Reconciliation, Ar   atorvastatin (LIPITOR) 40 MG tablet Take 2 tablets by mouth at bedtime Indications: hyperlipidemia 22   Automatic Reconciliation, Ar   furosemide (LASIX) 40 MG tablet daily Indications: HTN/edema 21   Automatic Reconciliation, Ar   spironolactone (ALDACTONE) 100 MG tablet nightly Indications: HTN/edema 12/10/20   Automatic Reconciliation, Ar       Current medications:    Current Facility-Administered Medications   Medication Dose Route Frequency Provider Last Rate Last

## 2024-09-17 ENCOUNTER — ANESTHESIA EVENT (OUTPATIENT)
Facility: HOSPITAL | Age: 69
End: 2024-09-17
Payer: MEDICARE

## 2024-09-17 RX ORDER — NALOXONE HYDROCHLORIDE 0.4 MG/ML
INJECTION, SOLUTION INTRAMUSCULAR; INTRAVENOUS; SUBCUTANEOUS PRN
Status: CANCELLED | OUTPATIENT
Start: 2024-09-17

## 2024-09-17 RX ORDER — SODIUM CHLORIDE 9 MG/ML
INJECTION, SOLUTION INTRAVENOUS PRN
Status: CANCELLED | OUTPATIENT
Start: 2024-09-17

## 2024-09-17 RX ORDER — SODIUM CHLORIDE 0.9 % (FLUSH) 0.9 %
5-40 SYRINGE (ML) INJECTION EVERY 12 HOURS SCHEDULED
Status: CANCELLED | OUTPATIENT
Start: 2024-09-17

## 2024-09-17 RX ORDER — SODIUM CHLORIDE 0.9 % (FLUSH) 0.9 %
5-40 SYRINGE (ML) INJECTION PRN
Status: CANCELLED | OUTPATIENT
Start: 2024-09-17

## 2024-09-18 ENCOUNTER — ANESTHESIA (OUTPATIENT)
Facility: HOSPITAL | Age: 69
End: 2024-09-18
Payer: MEDICARE

## 2024-09-18 ENCOUNTER — HOSPITAL ENCOUNTER (OUTPATIENT)
Facility: HOSPITAL | Age: 69
Setting detail: OUTPATIENT SURGERY
Discharge: HOME | End: 2024-09-18
Attending: OPHTHALMOLOGY | Admitting: OPHTHALMOLOGY
Payer: MEDICARE

## 2024-09-18 VITALS
TEMPERATURE: 97.7 F | WEIGHT: 269.5 LBS | RESPIRATION RATE: 16 BRPM | BODY MASS INDEX: 50.88 KG/M2 | HEIGHT: 61 IN | DIASTOLIC BLOOD PRESSURE: 78 MMHG | SYSTOLIC BLOOD PRESSURE: 176 MMHG | OXYGEN SATURATION: 100 % | HEART RATE: 76 BPM

## 2024-09-18 LAB — GLUCOSE BLD STRIP.AUTO-MCNC: 117 MG/DL (ref 70–110)

## 2024-09-18 PROCEDURE — 2709999900 HC NON-CHARGEABLE SUPPLY: Performed by: OPHTHALMOLOGY

## 2024-09-18 PROCEDURE — 6360000002 HC RX W HCPCS: Performed by: OPHTHALMOLOGY

## 2024-09-18 PROCEDURE — 6370000000 HC RX 637 (ALT 250 FOR IP): Performed by: OPHTHALMOLOGY

## 2024-09-18 PROCEDURE — 2580000003 HC RX 258: Performed by: OPHTHALMOLOGY

## 2024-09-18 PROCEDURE — 7100000010 HC PHASE II RECOVERY - FIRST 15 MIN: Performed by: OPHTHALMOLOGY

## 2024-09-18 PROCEDURE — 3600000012 HC SURGERY LEVEL 2 ADDTL 15MIN: Performed by: OPHTHALMOLOGY

## 2024-09-18 PROCEDURE — 2720000010 HC SURG SUPPLY STERILE: Performed by: OPHTHALMOLOGY

## 2024-09-18 PROCEDURE — V2632 POST CHMBR INTRAOCULAR LENS: HCPCS | Performed by: OPHTHALMOLOGY

## 2024-09-18 PROCEDURE — 3600000002 HC SURGERY LEVEL 2 BASE: Performed by: OPHTHALMOLOGY

## 2024-09-18 PROCEDURE — 6360000002 HC RX W HCPCS: Performed by: NURSE ANESTHETIST, CERTIFIED REGISTERED

## 2024-09-18 PROCEDURE — 3700000001 HC ADD 15 MINUTES (ANESTHESIA): Performed by: OPHTHALMOLOGY

## 2024-09-18 PROCEDURE — 3700000000 HC ANESTHESIA ATTENDED CARE: Performed by: OPHTHALMOLOGY

## 2024-09-18 PROCEDURE — 82962 GLUCOSE BLOOD TEST: CPT

## 2024-09-18 PROCEDURE — 2500000003 HC RX 250 WO HCPCS: Performed by: OPHTHALMOLOGY

## 2024-09-18 PROCEDURE — 7100000011 HC PHASE II RECOVERY - ADDTL 15 MIN: Performed by: OPHTHALMOLOGY

## 2024-09-18 RX ORDER — BALANCED SALT SOLUTION 6.4; .75; .48; .3; 3.9; 1.7 MG/ML; MG/ML; MG/ML; MG/ML; MG/ML; MG/ML
SOLUTION OPHTHALMIC PRN
Status: DISCONTINUED | OUTPATIENT
Start: 2024-09-18 | End: 2024-09-18 | Stop reason: ALTCHOICE

## 2024-09-18 RX ORDER — EPINEPHRINE 1 MG/ML
INJECTION, SOLUTION, CONCENTRATE INTRAVENOUS PRN
Status: DISCONTINUED | OUTPATIENT
Start: 2024-09-18 | End: 2024-09-18 | Stop reason: ALTCHOICE

## 2024-09-18 RX ORDER — OFLOXACIN 3 MG/ML
1 SOLUTION/ DROPS OPHTHALMIC ONCE
Status: COMPLETED | OUTPATIENT
Start: 2024-09-18 | End: 2024-09-18

## 2024-09-18 RX ORDER — SODIUM CHLORIDE, POTASSIUM CHLORIDE, CALCIUM CHLORIDE, MAGNESIUM CHLORIDE, SODIUM ACETATE, AND SODIUM CITRATE 6.4; .75; .48; .3; 3.9; 1.7 MG/ML; MG/ML; MG/ML; MG/ML; MG/ML; MG/ML
SOLUTION IRRIGATION PRN
Status: DISCONTINUED | OUTPATIENT
Start: 2024-09-18 | End: 2024-09-18 | Stop reason: ALTCHOICE

## 2024-09-18 RX ORDER — SODIUM CHLORIDE, SODIUM LACTATE, POTASSIUM CHLORIDE, CALCIUM CHLORIDE 600; 310; 30; 20 MG/100ML; MG/100ML; MG/100ML; MG/100ML
INJECTION, SOLUTION INTRAVENOUS CONTINUOUS
Status: DISCONTINUED | OUTPATIENT
Start: 2024-09-18 | End: 2024-09-18 | Stop reason: HOSPADM

## 2024-09-18 RX ORDER — PHENYLEPHRINE HYDROCHLORIDE 25 MG/ML
1 SOLUTION/ DROPS OPHTHALMIC
Status: DISPENSED | OUTPATIENT
Start: 2024-09-18 | End: 2024-09-18

## 2024-09-18 RX ORDER — TROPICAMIDE 10 MG/ML
1 SOLUTION/ DROPS OPHTHALMIC
Status: DISPENSED | OUTPATIENT
Start: 2024-09-18 | End: 2024-09-18

## 2024-09-18 RX ORDER — LIDOCAIN/PHENYLEPH/BSS NO.2/PF 1 %-1.5 %
SYRINGE (ML) INTRAOCULAR PRN
Status: DISCONTINUED | OUTPATIENT
Start: 2024-09-18 | End: 2024-09-18 | Stop reason: ALTCHOICE

## 2024-09-18 RX ORDER — MIDAZOLAM HYDROCHLORIDE 1 MG/ML
INJECTION INTRAMUSCULAR; INTRAVENOUS
Status: DISCONTINUED | OUTPATIENT
Start: 2024-09-18 | End: 2024-09-18 | Stop reason: SDUPTHER

## 2024-09-18 RX ORDER — FENTANYL CITRATE 50 UG/ML
INJECTION, SOLUTION INTRAMUSCULAR; INTRAVENOUS
Status: DISCONTINUED | OUTPATIENT
Start: 2024-09-18 | End: 2024-09-18 | Stop reason: SDUPTHER

## 2024-09-18 RX ADMIN — FENTANYL CITRATE 100 MCG: 50 INJECTION, SOLUTION INTRAMUSCULAR; INTRAVENOUS at 09:33

## 2024-09-18 RX ADMIN — TROPICAMIDE 1 DROP: 10 SOLUTION/ DROPS OPHTHALMIC at 07:05

## 2024-09-18 RX ADMIN — PHENYLEPHRINE HYDROCHLORIDE 1 DROP: 25 SOLUTION/ DROPS OPHTHALMIC at 07:10

## 2024-09-18 RX ADMIN — OFLOXACIN 1 DROP: 3 SOLUTION OPHTHALMIC at 07:05

## 2024-09-18 RX ADMIN — TROPICAMIDE 1 DROP: 10 SOLUTION/ DROPS OPHTHALMIC at 07:10

## 2024-09-18 RX ADMIN — TROPICAMIDE 1 DROP: 10 SOLUTION/ DROPS OPHTHALMIC at 07:15

## 2024-09-18 RX ADMIN — FENTANYL CITRATE 50 MCG: 50 INJECTION, SOLUTION INTRAMUSCULAR; INTRAVENOUS at 09:35

## 2024-09-18 RX ADMIN — PHENYLEPHRINE HYDROCHLORIDE 1 DROP: 25 SOLUTION/ DROPS OPHTHALMIC at 07:05

## 2024-09-18 RX ADMIN — MIDAZOLAM 2 MG: 1 INJECTION INTRAMUSCULAR; INTRAVENOUS at 09:28

## 2024-09-18 RX ADMIN — SODIUM CHLORIDE, POTASSIUM CHLORIDE, SODIUM LACTATE AND CALCIUM CHLORIDE: 600; 310; 30; 20 INJECTION, SOLUTION INTRAVENOUS at 07:04

## 2024-09-18 RX ADMIN — FENTANYL CITRATE 50 MCG: 50 INJECTION, SOLUTION INTRAMUSCULAR; INTRAVENOUS at 09:37

## 2024-09-18 RX ADMIN — PHENYLEPHRINE HYDROCHLORIDE 1 DROP: 25 SOLUTION/ DROPS OPHTHALMIC at 07:15

## 2024-09-18 ASSESSMENT — PAIN - FUNCTIONAL ASSESSMENT: PAIN_FUNCTIONAL_ASSESSMENT: 0-10

## 2024-10-13 ENCOUNTER — HOSPITAL ENCOUNTER (EMERGENCY)
Facility: HOSPITAL | Age: 69
Discharge: HOME | End: 2024-10-13
Payer: MEDICARE

## 2024-10-13 VITALS
OXYGEN SATURATION: 100 % | DIASTOLIC BLOOD PRESSURE: 63 MMHG | WEIGHT: 269 LBS | RESPIRATION RATE: 18 BRPM | TEMPERATURE: 98.5 F | HEIGHT: 61 IN | BODY MASS INDEX: 50.79 KG/M2 | SYSTOLIC BLOOD PRESSURE: 159 MMHG | HEART RATE: 68 BPM

## 2024-10-13 DIAGNOSIS — L50.9 URTICARIA: Primary | ICD-10-CM

## 2024-10-13 DIAGNOSIS — T78.40XA ALLERGIC REACTION, INITIAL ENCOUNTER: ICD-10-CM

## 2024-10-13 PROCEDURE — 6370000000 HC RX 637 (ALT 250 FOR IP): Performed by: PHYSICIAN ASSISTANT

## 2024-10-13 PROCEDURE — 99283 EMERGENCY DEPT VISIT LOW MDM: CPT

## 2024-10-13 RX ORDER — CETIRIZINE HYDROCHLORIDE 10 MG/1
10 TABLET ORAL DAILY
Qty: 10 TABLET | Refills: 0 | Status: SHIPPED | OUTPATIENT
Start: 2024-10-13 | End: 2024-10-23

## 2024-10-13 RX ORDER — FAMOTIDINE 20 MG/1
20 TABLET, FILM COATED ORAL ONCE
Status: COMPLETED | OUTPATIENT
Start: 2024-10-13 | End: 2024-10-13

## 2024-10-13 RX ORDER — FAMOTIDINE 20 MG/1
20 TABLET, FILM COATED ORAL 2 TIMES DAILY
Qty: 20 TABLET | Refills: 0 | Status: SHIPPED | OUTPATIENT
Start: 2024-10-13 | End: 2024-10-23

## 2024-10-13 RX ORDER — EPINEPHRINE 0.3 MG/.3ML
0.3 INJECTION SUBCUTANEOUS ONCE
Qty: 1 EACH | Refills: 0 | Status: SHIPPED | OUTPATIENT
Start: 2024-10-13 | End: 2024-10-13

## 2024-10-13 RX ORDER — PREDNISONE 10 MG/1
TABLET ORAL
Qty: 21 EACH | Refills: 0 | Status: SHIPPED | OUTPATIENT
Start: 2024-10-13

## 2024-10-13 RX ORDER — PREDNISONE 20 MG/1
40 TABLET ORAL
Status: COMPLETED | OUTPATIENT
Start: 2024-10-13 | End: 2024-10-13

## 2024-10-13 RX ADMIN — FAMOTIDINE 20 MG: 20 TABLET, FILM COATED ORAL at 22:45

## 2024-10-13 RX ADMIN — PREDNISONE 40 MG: 20 TABLET ORAL at 22:45

## 2024-10-13 ASSESSMENT — PAIN - FUNCTIONAL ASSESSMENT: PAIN_FUNCTIONAL_ASSESSMENT: NONE - DENIES PAIN

## 2024-10-14 NOTE — ED TRIAGE NOTES
Patient arrives with c/o hives since Friday. Patient states she has been taking Benadryl without relief. Unsure if she is allergic to crabs which she ate on Friday. Denies SOB, trouble swallowing, or tongue swelling.

## 2024-10-14 NOTE — DISCHARGE INSTRUCTIONS
Recommend avoiding shellfish for the time being until you are able to follow-up with your primary care doctor and possibly an allergist, discontinue omeprazole while on Pepcid, discontinue Benadryl, we will put you on Zyrtec.  EpiPen sent to your pharmacy for anaphylactic reaction.  If you use your EpiPen, you should call 911 for transport to the hospital.  Please continue to monitor your blood sugars very closely while you are on steroids as steroids can cause your blood sugar to become elevated.  If your blood sugar is above 250, please discontinue the steroids.  If your symptoms are worsening, you develop facial swelling, difficulty breathing, return to the emergency department.

## 2024-10-14 NOTE — ED PROVIDER NOTES
parts of this dictation were completed with voice recognition software. Quite often unanticipated grammatical, syntax, homophones, and other interpretive errors are inadvertently transcribed by the computer software. Please disregards these errors. Please excuse any errors that have escaped final proofreading.)     Venecia Camacho PA-C  10/14/24 0043

## 2024-10-23 ENCOUNTER — OFFICE VISIT (OUTPATIENT)
Age: 69
End: 2024-10-23
Payer: MEDICARE

## 2024-10-23 VITALS
TEMPERATURE: 97.7 F | HEART RATE: 74 BPM | SYSTOLIC BLOOD PRESSURE: 141 MMHG | OXYGEN SATURATION: 99 % | DIASTOLIC BLOOD PRESSURE: 62 MMHG | HEIGHT: 61 IN | WEIGHT: 274 LBS | BODY MASS INDEX: 51.73 KG/M2

## 2024-10-23 DIAGNOSIS — K75.4 AUTOIMMUNE HEPATITIS (HCC): Primary | ICD-10-CM

## 2024-10-23 PROCEDURE — G8484 FLU IMMUNIZE NO ADMIN: HCPCS | Performed by: NURSE PRACTITIONER

## 2024-10-23 PROCEDURE — 3078F DIAST BP <80 MM HG: CPT | Performed by: NURSE PRACTITIONER

## 2024-10-23 PROCEDURE — G8400 PT W/DXA NO RESULTS DOC: HCPCS | Performed by: NURSE PRACTITIONER

## 2024-10-23 PROCEDURE — 99214 OFFICE O/P EST MOD 30 MIN: CPT | Performed by: NURSE PRACTITIONER

## 2024-10-23 PROCEDURE — 1123F ACP DISCUSS/DSCN MKR DOCD: CPT | Performed by: NURSE PRACTITIONER

## 2024-10-23 PROCEDURE — 3077F SYST BP >= 140 MM HG: CPT | Performed by: NURSE PRACTITIONER

## 2024-10-23 PROCEDURE — 1160F RVW MEDS BY RX/DR IN RCRD: CPT | Performed by: NURSE PRACTITIONER

## 2024-10-23 PROCEDURE — 1126F AMNT PAIN NOTED NONE PRSNT: CPT | Performed by: NURSE PRACTITIONER

## 2024-10-23 PROCEDURE — 1090F PRES/ABSN URINE INCON ASSESS: CPT | Performed by: NURSE PRACTITIONER

## 2024-10-23 PROCEDURE — G8417 CALC BMI ABV UP PARAM F/U: HCPCS | Performed by: NURSE PRACTITIONER

## 2024-10-23 PROCEDURE — G8427 DOCREV CUR MEDS BY ELIG CLIN: HCPCS | Performed by: NURSE PRACTITIONER

## 2024-10-23 PROCEDURE — 3017F COLORECTAL CA SCREEN DOC REV: CPT | Performed by: NURSE PRACTITIONER

## 2024-10-23 PROCEDURE — 1159F MED LIST DOCD IN RCRD: CPT | Performed by: NURSE PRACTITIONER

## 2024-10-23 PROCEDURE — 1036F TOBACCO NON-USER: CPT | Performed by: NURSE PRACTITIONER

## 2024-10-23 RX ORDER — KETOROLAC TROMETHAMINE 5 MG/ML
SOLUTION OPHTHALMIC
COMMUNITY
Start: 2024-09-09 | End: 2024-10-23

## 2024-10-23 RX ORDER — PREDNISOLONE ACETATE 10 MG/ML
SUSPENSION/ DROPS OPHTHALMIC
COMMUNITY
Start: 2024-09-09

## 2024-10-23 RX ORDER — OFLOXACIN 3 MG/ML
SOLUTION/ DROPS OPHTHALMIC
COMMUNITY
Start: 2024-09-09

## 2024-10-23 RX ORDER — NYSTATIN 100000 [USP'U]/G
POWDER TOPICAL
COMMUNITY
Start: 2024-06-25 | End: 2024-10-23

## 2024-10-23 NOTE — PROGRESS NOTES
answered.  The patient expressed a clear understanding of the above.    Follow-up [facility] in 6 months for routine monitoring.       [facility]  [address], [address]  [place]  [address] [phone]

## 2025-02-24 NOTE — ED NOTES
I have reviewed discharge instructions with the patient. The patient verbalized understanding.   Patient armband removed and shredded
Patient aware of urine sample needed. Unable to void at current time. Will re attempt to collect at a later time.
Patient was placed on a bedpan to attempt to obtain a urine specimen, Patient was unable to urinate at this time.
- - -

## 2025-03-19 NOTE — ED PROVIDER NOTES
EMERGENCY DEPARTMENT HISTORY AND PHYSICAL EXAM    Date: 3/30/2022  Patient Name: Jared Romero    History of Presenting Illness     Chief Complaint   Patient presents with    Leg Swelling    Leg Pain          History Provided By: Patient    Chief Complaint: leg swelling/pain      Additional History (Context):   4:20 PM  Jared Romero is a 74 y.o. female with PMHX diabetes, hypertension, liver disease presents to the emergency department C/O left lower leg pain and swelling for the past week. Patient states she has a history of edema and the bilateral lower extremities and was supposed to wear compression hose but does not wear them. States she does take her medications as directed. Denies chest pain or shortness of breath. PCP: Charbel Wu MD    Current Outpatient Medications   Medication Sig Dispense Refill    carboxymethylcellulose sodium (REFRESH LIQUIGEL) 1 % dlgl ophthalmic solution Administer 1 Drop to both eyes three (3) times daily as needed (DRY EYES). 15 mL 0    guaiFENesin-dextromethorphan SR (Mucinex DM) 600-30 mg per tablet Take 1 Tablet by mouth two (2) times a day. 30 Tablet 1    cetirizine (ZyrTEC) 10 mg tablet Take one tablets by mouth daily for 7 days and then as needed after that. Restart forseven days if sinus congestion recurs. 15 Tablet 0    glycopyrrolate (RobinuL) 1 mg tablet Take 1 Tablet by mouth three (3) times daily as needed (gall bladder attacks). Indications: gall bladder attacks 20 Tablet 0    furosemide (LASIX) 40 mg tablet TAKE 1 TABLET EVERY DAY 90 Tab 3    spironolactone (ALDACTONE) 100 mg tablet TAKE 1 TABLET EVERY DAY 90 Tab 3    methocarbamoL (Robaxin) 500 mg tablet Take 2 Tabs by mouth three (3) times daily. 30 Tab 0    aspirin delayed-release 81 mg tablet Take  by mouth daily.  amLODIPine (NORVASC) 2.5 mg tablet       LANTUS SOLOSTAR 100 unit/mL (3 mL) inpn 26 Units daily.          Past History     Past Medical History:  Past Medical History: Diagnosis Date    Diabetes ([facility] [address]     Hypertension     Liver disease        Past Surgical History:  Past Surgical History:   Procedure Laterality Date    HX  SECTION         Family History:  No family history on file. Social History:  Social History     Tobacco Use    Smoking status: Never Smoker    Smokeless tobacco: Never Used   Vaping Use    Vaping Use: Never used   Substance Use Topics    Alcohol use: Yes     Comment: 2 Drinks every 2 months    Drug use: No       Allergies: Allergies   Allergen Reactions    Lisinopril Angioedema       Review of Systems   Review of Systems   Constitutional: Negative for chills and fever. Respiratory: Negative for shortness of breath. Cardiovascular: Positive for leg swelling. Negative for chest pain and palpitations. Gastrointestinal: Negative for abdominal pain, diarrhea, nausea and vomiting. Musculoskeletal: Negative for back pain. Skin: Negative for wound. Neurological: Negative for weakness and numbness. All other systems reviewed and are negative. Physical Exam     Vitals:    22 2000 22 2030 22 2100 22 2130   BP: (!) 159/81 (!) 148/47 (!) 155/100 (!) 138/55   Pulse:       Resp:       Temp:       SpO2:       Weight:       Height:         Physical Exam  Vitals and nursing note reviewed. Constitutional:       Appearance: She is well-developed. HENT:      Head: Normocephalic and atraumatic. Cardiovascular:      Rate and Rhythm: Normal rate and regular rhythm. Heart sounds: Normal heart sounds. No murmur heard. Pulmonary:      Effort: Pulmonary effort is normal. No respiratory distress. Breath sounds: Normal breath sounds. No wheezing or rales. Comments: Lungs are clear to auscultation bilaterally  Abdominal:      General: Bowel sounds are normal.      Palpations: Abdomen is soft. Tenderness: There is no abdominal tenderness.    Musculoskeletal:      Cervical back: Normal range of Detail Level: Detailed motion and neck supple. Right lower leg: Edema present. Left lower leg: Edema present. Comments: 2+ pitting edema bilaterally with chronic skin changes   Neurological:      Mental Status: She is alert and oriented to person, place, and time. Psychiatric:         Judgment: Judgment normal.         Diagnostic Study Results     Labs:   No results found for this or any previous visit (from the past 12 hour(s)). Radiologic Studies:   XR CHEST PORT   Final Result      Is prominence of the hilum which may represent prominent vasculature or   adenopathy. Increase in interstitial lung markings suggesting mild to moderate   pulmonary edema. Recommend follow-up. DUPLEX LOWER EXT VENOUS BILAT   Final Result        CT Results  (Last 48 hours)    None        CXR Results  (Last 48 hours)    None          Medical Decision Making   I am the first provider for this patient. I reviewed the vital signs, available nursing notes, past medical history, past surgical history, family history and social history. Vital Signs: Reviewed the patient's vital signs. Pulse Oximetry Analysis: 100% on RA     EKG interpretation: (Preliminary)  4:20 PM   Normal sinus rhythm rate 78 bpm no STEMI  EKG read by Viji Bryant PA-C   at 5:39 PM       Records Reviewed: Nursing Notes and Old Medical Records    Procedures:  Procedures    ED Course:   4:20 PM Initial assessment performed. The patients presenting problems have been discussed, and they are in agreement with the care plan formulated and outlined with them. I have encouraged them to ask questions as they arise throughout their visit. Discussion:  Pt presents with lower extremity edema. Patient has history of edema and is supposed to wear her compression hose but is noncompliant with them. Denies chest pain or shortness of breath. PVL study negative for DVT. Labs within normal limits the exception of hypokalemia which was replaced orally.   EKG shows no Biopsy Photograph Reviewed: Yes ischemic changes. BNP is normal.  Chest x-ray shows slight pulmonary congestion but her lungs are clear to auscultation. O2 100% on room air. Patient does take Lasix but with finding low hypokalemia we will have her follow-up with her doctor for recheck. Advised patient to use her compression hose and keep legs elevated. Strict return precautions given, pt offering no questions or complaints. Diagnosis and Disposition     DISCHARGE NOTE:  Norman Weber  results have been reviewed with her. She has been counseled regarding her diagnosis, treatment, and plan. She verbally conveys understanding and agreement of the signs, symptoms, diagnosis, treatment and prognosis and additionally agrees to follow up as discussed. She also agrees with the care-plan and conveys that all of her questions have been answered. I have also provided discharge instructions for her that include: educational information regarding their diagnosis and treatment, and list of reasons why they would want to return to the ED prior to their follow-up appointment, should her condition change. She has been provided with education for proper emergency department utilization. CLINICAL IMPRESSION:    1. Lower extremity edema    2. Hypokalemia        PLAN:  1. D/C Home  2. Discharge Medication List as of 3/30/2022  9:35 PM        3. Follow-up Information     Follow up With Specialties Details Why Contact Info    Cassy Caceres MD Family Medicine Schedule an appointment as soon as possible for a visit   [address] [place] [address]      [facility] EMERGENCY DEPT Emergency Medicine  If symptoms worsen [address] [place] [phone]             Please note that this dictation was completed with [employer] ([employer]), the computer voice recognition software.   Quite often unanticipated grammatical, syntax, homophones, and other interpretive errors are inadvertently transcribed by the Number Of Curettages: 3 computer software. Please disregard these errors. Please excuse any errors that have escaped final proofreading. Size Of Lesion In Cm: 0.7 Add Intralesional Injection: No Concentration (Mg/Ml Or Millions Of Plaque Forming Units/Cc): 0.01 Total Volume (Ccs): 1 Anesthesia Type: 1% lidocaine without epinephrine Cautery Type: electrodesiccation What Was Performed First?: Curettage Final Size Statement: The size of the lesion after curettage was Additional Information: (Optional): The wound was cleaned, and a pressure dressing was applied.  The patient received detailed post-op instructions. Consent: Verbal consent was obtained from the patient. The risks, benefits and alternatives to therapy were discussed in detail. Specifically, the risks of infection, scarring, bleeding, prolonged wound healing, nerve injury, incomplete removal, allergy to anesthesia and recurrence were addressed. Alternatives to ED&C, such as: surgical removal were also discussed.  Prior to the procedure, the treatment site was clearly identified and confirmed by the patient. All components of Universal Protocol/PAUSE Rule completed. Post-Care Instructions: I reviewed with the patient in detail post-care instructions. Patient is to keep the area dry for 48 hours, and not to engage in any swimming until the area is healed. Should the patient develop any fevers, chills, bleeding, severe pain patient will contact the office immediately. Bill As A Line Item Or As Units: Line Item

## 2025-04-23 ENCOUNTER — HOSPITAL ENCOUNTER (OUTPATIENT)
Facility: HOSPITAL | Age: 70
Setting detail: SPECIMEN
Discharge: HOME | End: 2025-04-26

## 2025-04-23 ENCOUNTER — OFFICE VISIT (OUTPATIENT)
Age: 70
End: 2025-04-23
Payer: MEDICARE

## 2025-04-23 VITALS
BODY MASS INDEX: 45.5 KG/M2 | WEIGHT: 241 LBS | SYSTOLIC BLOOD PRESSURE: 144 MMHG | HEART RATE: 80 BPM | OXYGEN SATURATION: 98 % | HEIGHT: 61 IN | RESPIRATION RATE: 20 BRPM | DIASTOLIC BLOOD PRESSURE: 62 MMHG

## 2025-04-23 DIAGNOSIS — K75.4 AUTOIMMUNE HEPATITIS (HCC): ICD-10-CM

## 2025-04-23 DIAGNOSIS — K75.4 AUTOIMMUNE HEPATITIS (HCC): Primary | ICD-10-CM

## 2025-04-23 LAB — LABCORP SPECIMEN COLLECTION: NORMAL

## 2025-04-23 PROCEDURE — 1159F MED LIST DOCD IN RCRD: CPT | Performed by: NURSE PRACTITIONER

## 2025-04-23 PROCEDURE — 1123F ACP DISCUSS/DSCN MKR DOCD: CPT | Performed by: NURSE PRACTITIONER

## 2025-04-23 PROCEDURE — 3077F SYST BP >= 140 MM HG: CPT | Performed by: NURSE PRACTITIONER

## 2025-04-23 PROCEDURE — 1126F AMNT PAIN NOTED NONE PRSNT: CPT | Performed by: NURSE PRACTITIONER

## 2025-04-23 PROCEDURE — 99213 OFFICE O/P EST LOW 20 MIN: CPT | Performed by: NURSE PRACTITIONER

## 2025-04-23 PROCEDURE — 3078F DIAST BP <80 MM HG: CPT | Performed by: NURSE PRACTITIONER

## 2025-04-23 PROCEDURE — 99001 SPECIMEN HANDLING PT-LAB: CPT

## 2025-04-23 PROCEDURE — 1160F RVW MEDS BY RX/DR IN RCRD: CPT | Performed by: NURSE PRACTITIONER

## 2025-04-23 NOTE — PROGRESS NOTES
\"Have you been to the ER, urgent care clinic since your last visit?  Hospitalized since your last visit?\"    NO    “Have you seen or consulted any other health care providers outside our system since your last visit?”    NO               
were answered.  The patient expressed a clear understanding of the above.    Follow-up [facility] in 6 months for routine monitoring.       [facility]  [address], [address]  [place]  [address] [phone]

## 2025-04-24 LAB
ALBUMIN SERPL-MCNC: 4.2 G/DL (ref 3.8–4.8)
ALP SERPL-CCNC: 98 IU/L (ref 44–121)
ALT SERPL-CCNC: 7 IU/L (ref 0–32)
AST SERPL-CCNC: 12 IU/L (ref 0–40)
BILIRUB DIRECT SERPL-MCNC: 0.13 MG/DL (ref 0–0.4)
BILIRUB SERPL-MCNC: 0.5 MG/DL (ref 0–1.2)
PROT SERPL-MCNC: 7.8 G/DL (ref 6–8.5)

## 2025-04-30 ENCOUNTER — RESULTS FOLLOW-UP (OUTPATIENT)
Age: 70
End: 2025-04-30

## 2025-05-05 NOTE — TELEPHONE ENCOUNTER
----- Message from MURPHY Irizarry NP sent at 4/30/2025  1:09 PM EDT -----  Liver enzymes are normal. Please let patient know I faxed results to Dr. Jeter

## (undated) DEVICE — SYSTEM FLD MGMT DIA0.9MM 45DEG ULT BAL VISN ACT INTREPID

## (undated) DEVICE — SOLUTION IRRIGATION BAL SALT SOLUTION 500 ML STRL BSS

## (undated) DEVICE — GARMENT,MEDLINE,DVT,INT,CALF,MED, GEN2: Brand: MEDLINE

## (undated) DEVICE — 3M™ TEGADERM™ TRANSPARENT FILM DRESSING FRAME STYLE, 1624W, 2-3/8 IN X 2-3/4 IN (6 CM X 7 CM), 100/CT 4CT/CASE: Brand: 3M™ TEGADERM™

## (undated) DEVICE — TOWEL SURG W16XL26IN BLU NONFENESTRATED DLX ST 2 PER PK

## (undated) DEVICE — CANNULA FRM 27GA 7 8IN

## (undated) DEVICE — CYTOTOME IRRIG 25GA L16MM ANT CAP BENT

## (undated) DEVICE — TOWEL,OR,DSP,ST,BLUE,STD,4/PK,20PK/CS: Brand: MEDLINE

## (undated) DEVICE — Device

## (undated) DEVICE — STRAP,POSITIONING,KNEE/BODY,FOAM,4X60": Brand: MEDLINE

## (undated) DEVICE — SOLUTION IRRIGATION H2O 0797305] ICU MEDICAL INC]

## (undated) DEVICE — DRAPE TWL SURG 16X26IN BLU ORB04] ALLCARE INC]

## (undated) DEVICE — GLOVE ORANGE PI 7 1/2   MSG9075

## (undated) DEVICE — CLEARCUT® SLIT KNIFE INTREPID MICRO-COAXIAL SYSTEM 2.4 SB: Brand: CLEARCUT®; INTREPID